# Patient Record
Sex: FEMALE | Race: WHITE | NOT HISPANIC OR LATINO | Employment: OTHER | URBAN - METROPOLITAN AREA
[De-identification: names, ages, dates, MRNs, and addresses within clinical notes are randomized per-mention and may not be internally consistent; named-entity substitution may affect disease eponyms.]

---

## 2017-03-20 RX ORDER — CLOPIDOGREL BISULFATE 75 MG/1
TABLET ORAL
Qty: 90 TABLET | Refills: 1 | Status: SHIPPED | OUTPATIENT
Start: 2017-03-20 | End: 2017-09-12 | Stop reason: SDUPTHER

## 2017-04-19 ENCOUNTER — OFFICE VISIT (OUTPATIENT)
Dept: CARDIOLOGY | Facility: CLINIC | Age: 67
End: 2017-04-19
Payer: MEDICARE

## 2017-04-19 ENCOUNTER — CLINICAL SUPPORT (OUTPATIENT)
Dept: CARDIOLOGY | Facility: CLINIC | Age: 67
End: 2017-04-19
Payer: MEDICARE

## 2017-04-19 VITALS
HEART RATE: 61 BPM | DIASTOLIC BLOOD PRESSURE: 60 MMHG | BODY MASS INDEX: 26.24 KG/M2 | SYSTOLIC BLOOD PRESSURE: 120 MMHG | HEIGHT: 65 IN | WEIGHT: 157.5 LBS

## 2017-04-19 DIAGNOSIS — I48.92 ATRIAL FLUTTER, PAROXYSMAL: ICD-10-CM

## 2017-04-19 DIAGNOSIS — Z98.61 HISTORY OF CORONARY ANGIOPLASTY: Chronic | ICD-10-CM

## 2017-04-19 DIAGNOSIS — I25.10 CORONARY ARTERY DISEASE DUE TO LIPID RICH PLAQUE: ICD-10-CM

## 2017-04-19 DIAGNOSIS — I25.83 CORONARY ARTERY DISEASE DUE TO LIPID RICH PLAQUE: ICD-10-CM

## 2017-04-19 DIAGNOSIS — I20.9 ANGINA, CLASS II: ICD-10-CM

## 2017-04-19 DIAGNOSIS — I25.10 CORONARY ARTERY DISEASE, OCCLUSIVE: Primary | ICD-10-CM

## 2017-04-19 DIAGNOSIS — I48.92 ATRIAL FLUTTER, PAROXYSMAL: Primary | ICD-10-CM

## 2017-04-19 PROCEDURE — 99999 PR PBB SHADOW E&M-EST. PATIENT-LVL III: CPT | Mod: PBBFAC,,, | Performed by: NUCLEAR MEDICINE

## 2017-04-19 PROCEDURE — 99214 OFFICE O/P EST MOD 30 MIN: CPT | Mod: S$PBB,,, | Performed by: NUCLEAR MEDICINE

## 2017-04-19 PROCEDURE — 93000 ELECTROCARDIOGRAM COMPLETE: CPT | Mod: S$GLB,,, | Performed by: INTERNAL MEDICINE

## 2017-04-19 PROCEDURE — 99213 OFFICE O/P EST LOW 20 MIN: CPT | Mod: PBBFAC | Performed by: NUCLEAR MEDICINE

## 2017-04-19 NOTE — PROGRESS NOTES
Subjective:   Patient ID:  Shanda Marrero is a 66 y.o. female who presents for follow-up of Carotid Artery Disease and Atrial Fibrillation (PAF.)      HPICHRONIC CAD-/ POST PCI- ANGINA..  2- PAF,  3- DYSLIPIDEMIA  NO RECURRENT ANGINA. NO UNUSUAL SIMPSON. NO ORTHOPNEA OR PND  NO PALPITATIONS. NO SYNCOPE  NO EDEMA. NO INTERMITTENT CLAUDICATION   NO FOCAL CNS SYMPTOMS OR SIGNS TO SUGGEST TIA OR STROKE  CARD MED WELL TOLERATED    Review of Systems   Constitution: Negative for chills, fever, weakness, night sweats, weight gain and weight loss.   HENT: Negative for headaches and nosebleeds.    Eyes: Negative for blurred vision, double vision and visual disturbance.   Cardiovascular: Negative for chest pain, dyspnea on exertion, irregular heartbeat, leg swelling, orthopnea, palpitations, paroxysmal nocturnal dyspnea and syncope.   Respiratory: Negative for cough, hemoptysis and wheezing.    Endocrine: Negative for polydipsia and polyuria.   Hematologic/Lymphatic: Does not bruise/bleed easily.   Skin: Negative for rash.   Musculoskeletal: Negative for joint pain, joint swelling, muscle weakness and myalgias.   Gastrointestinal: Negative for abdominal pain, hematemesis, jaundice and melena.   Genitourinary: Negative for dysuria, hematuria and nocturia.   Neurological: Negative for dizziness, focal weakness and sensory change.   Psychiatric/Behavioral: Negative for depression. The patient does not have insomnia and is not nervous/anxious.      Family History   Problem Relation Age of Onset    Heart attack Father     Heart disease Father      Past Medical History:   Diagnosis Date    *Atrial flutter     PAROXYSMAL    Acute coronary syndrome     OLD INF MI    Coronary artery disease     Hyperlipidemia      Current Outpatient Prescriptions on File Prior to Visit   Medication Sig Dispense Refill    aspirin (ECOTRIN) 81 MG EC tablet Take 81 mg by mouth once daily.      clopidogrel (PLAVIX) 75 mg tablet TAKE 1 TABLET BY MOUTH  EVERY DAY 90 tablet 1    coenzyme Q10 10 mg capsule Take 200 mg by mouth once daily.      D3-red wine-resveratrol-malt 5,000-200 unit-mg Cap Take by mouth.      metoprolol succinate (TOPROL-XL) 25 MG 24 hr tablet TAKE 1 TABLET EVERY DAY 90 tablet 3    omega-3 fatty acids-vitamin E (FISH OIL) 1,000 mg Cap Take 1,000 mg by mouth.       omeprazole (PRILOSEC) 20 MG capsule Take 20 mg by mouth once daily.      simvastatin (ZOCOR) 40 MG tablet TAKE 1 TABLET EVERY EVENING 90 tablet 3    esomeprazole (NEXIUM) 20 MG capsule Take 20 mg by mouth before breakfast.       No current facility-administered medications on file prior to visit.      Review of patient's allergies indicates:   Allergen Reactions    No known allergies        Objective:     Physical Exam   Constitutional: She is oriented to person, place, and time. She appears well-developed. No distress.   HENT:   Head: Normocephalic.   Eyes: Conjunctivae are normal. Pupils are equal, round, and reactive to light. No scleral icterus.   Neck: Normal range of motion. Neck supple. Normal carotid pulses, no hepatojugular reflux and no JVD present. Carotid bruit is not present. No edema present. No thyroid mass and no thyromegaly present.   Cardiovascular: Normal rate, regular rhythm, S1 normal, S2 normal, normal heart sounds and intact distal pulses.  PMI is not displaced.  Exam reveals no gallop and no friction rub.    No murmur heard.  Pulses:       Carotid pulses are 2+ on the right side, and 2+ on the left side.       Radial pulses are 2+ on the right side, and 2+ on the left side.        Femoral pulses are 2+ on the right side, and 2+ on the left side.       Popliteal pulses are 2+ on the right side, and 2+ on the left side.        Dorsalis pedis pulses are 2+ on the right side, and 2+ on the left side.        Posterior tibial pulses are 2+ on the right side, and 2+ on the left side.   Pulmonary/Chest: Effort normal and breath sounds normal. She has no wheezes.  She has no rales. She exhibits no tenderness.   Abdominal: Soft. Bowel sounds are normal. She exhibits no pulsatile midline mass and no mass. There is no hepatosplenomegaly. There is no tenderness.   Musculoskeletal: Normal range of motion. She exhibits no edema or tenderness.        Cervical back: Normal.        Thoracic back: Normal.        Lumbar back: Normal.   Lymphadenopathy:     She has no cervical adenopathy.     She has no axillary adenopathy.        Right: No supraclavicular adenopathy present.        Left: No supraclavicular adenopathy present.   Neurological: She is alert and oriented to person, place, and time. She has normal strength and normal reflexes. No sensory deficit. Gait normal.   Skin: Skin is warm. No rash noted. No cyanosis. No pallor. Nails show no clubbing.   Psychiatric: She has a normal mood and affect. Her speech is normal and behavior is normal. Cognition and memory are normal.       Assessment:     1. Coronary artery disease, occlusive    2. Angina, class II    3. History of coronary angioplasty    4. Atrial flutter, paroxysmal      ECG TODAY- SR, INF SCAR, NO ACUTE ST T CHANGES  Plan:     Coronary artery disease, occlusive    Angina, class II    History of coronary angioplasty    Atrial flutter, paroxysmal      1- CONTINUE PRESENT CARD MANAGEMENT    2- RETURN IN 6 MONTHS

## 2017-04-19 NOTE — MR AVS SNAPSHOT
[facility]  [id number] [facility] [address]  Phone: [phone]  Fax: [phone]                  Shanda Marrero   2017 1:40 PM   Office Visit    Description:  Female : 1950   Provider:  Rosalio León MD   Department:  [facility]           Reason for Visit     Carotid Artery Disease     Atrial Fibrillation           Diagnoses this Visit        Comments    Coronary artery disease, occlusive    -  Primary     Angina, class II         History of coronary angioplasty         Atrial flutter, paroxysmal                To Do List           Goals (5 Years of Data)     None      Follow-Up and Disposition     Return in about 6 months (around 10/19/2017).      [facility]     [facility] -  Assistance  Unless otherwise directed by your provider, please contact [facility], our nurse care line that is available for  assistance.     Registered nurses in the [facility] provide: appointment scheduling, clinical advisement, health education, and other advisory services.  Call: [phone] (toll free)               Medications           Message regarding Medications     Verify the changes and/or additions to your medication regime listed below are the same as discussed with your clinician today.  If any of these changes or additions are incorrect, please notify your healthcare provider.             Verify that the below list of medications is an accurate representation of the medications you are currently taking.  If none reported, the list may be blank. If incorrect, please contact your healthcare provider. Carry this list with you in case of emergency.           Current Medications     aspirin (ECOTRIN) 81 MG EC tablet Take 81 mg by mouth once daily.    clopidogrel (PLAVIX) 75 mg tablet TAKE 1 TABLET BY MOUTH EVERY DAY    coenzyme Q10 10 mg capsule Take 200 mg by mouth once daily.    D3-red wine-resveratrol-malt  "5,000-200 unit-mg Cap Take by mouth.    DIPHENHYDRAMINE HCL (BENADRYL ALLERGY ORAL) Take 2 tablets by mouth nightly as needed.    metoprolol succinate (TOPROL-XL) 25 MG 24 hr tablet TAKE 1 TABLET EVERY DAY    omega-3 fatty acids-vitamin E (FISH OIL) 1,000 mg Cap Take 1,000 mg by mouth.     omeprazole (PRILOSEC) 20 MG capsule Take 20 mg by mouth once daily.    simvastatin (ZOCOR) 40 MG tablet TAKE 1 TABLET EVERY EVENING    esomeprazole (NEXIUM) 20 MG capsule Take 20 mg by mouth before breakfast.           Clinical Reference Information           Your Vitals Were     BP Pulse Height Weight BMI    120/60 (BP Location: Left arm, Patient Position: Sitting, BP Method: Manual) 61 5' 4.5" (1.638 m) 71.5 kg (157 lb 8.3 oz) 26.62 kg/m2      Blood Pressure          Most Recent Value    BP  120/60      Allergies as of 4/19/2017     No Known Allergies      Immunizations Administered on Date of Encounter - 4/19/2017     None      [facility] Sign-Up     Activating your [facility] account is as easy as 1-2-3!     1) Visit [email or web address], select Sign Up Now, enter this activation code and your date of birth, then select Next.  [email or web address]  Expires: 6/3/2017  1:29 PM      2) Create a username and password to use when you visit [facility] in the future and select a security question in case you lose your password and select Next.    3) Enter your e-mail address and click Sign Up!    Additional Information  If you have questions, please e-mail [email or web address] or call [phone] to talk to our [facility] staff. Remember, [facility] is NOT to be used for urgent needs. For medical emergencies, dial 911.         Language Assistance Services     ATTENTION: Language assistance services are available, free of charge. Please call [phone].      ATENCIÓN: Si habla español, tiene a cee disposición servicios gratuitos de asistencia lingüística. Llame al [phone].     CHÚ Ý: N?u b?n nói Ti?ng Vi?t, có các d?ch v? h? tr? ngôn " ng? mi?n phí dành cho b?n. G?i s? [phone].         [facility] complies with applicable Federal civil rights laws and does not discriminate on the basis of race, color, national origin, age, disability, or sex.

## 2017-06-15 RX ORDER — METOPROLOL SUCCINATE 25 MG/1
TABLET, EXTENDED RELEASE ORAL
Qty: 90 TABLET | Refills: 3 | Status: SHIPPED | OUTPATIENT
Start: 2017-06-15 | End: 2018-05-25 | Stop reason: SDUPTHER

## 2017-09-12 DIAGNOSIS — I25.10 CORONARY ATHEROSCLEROSIS: ICD-10-CM

## 2017-09-12 RX ORDER — CLOPIDOGREL BISULFATE 75 MG/1
TABLET ORAL
Qty: 90 TABLET | Refills: 1 | Status: SHIPPED | OUTPATIENT
Start: 2017-09-12 | End: 2018-02-27 | Stop reason: SDUPTHER

## 2017-09-12 RX ORDER — SIMVASTATIN 40 MG/1
TABLET, FILM COATED ORAL
Qty: 90 TABLET | Refills: 3 | Status: SHIPPED | OUTPATIENT
Start: 2017-09-12 | End: 2018-08-21 | Stop reason: SDUPTHER

## 2017-10-18 ENCOUNTER — OFFICE VISIT (OUTPATIENT)
Dept: CARDIOLOGY | Facility: CLINIC | Age: 67
End: 2017-10-18
Payer: MEDICARE

## 2017-10-18 VITALS
DIASTOLIC BLOOD PRESSURE: 60 MMHG | WEIGHT: 167.13 LBS | HEIGHT: 64 IN | HEART RATE: 64 BPM | SYSTOLIC BLOOD PRESSURE: 120 MMHG | BODY MASS INDEX: 28.53 KG/M2

## 2017-10-18 DIAGNOSIS — Z98.61 HISTORY OF CORONARY ANGIOPLASTY: Chronic | ICD-10-CM

## 2017-10-18 DIAGNOSIS — I25.83 CORONARY ARTERY DISEASE DUE TO LIPID RICH PLAQUE: ICD-10-CM

## 2017-10-18 DIAGNOSIS — I25.10 CORONARY ARTERY DISEASE DUE TO LIPID RICH PLAQUE: ICD-10-CM

## 2017-10-18 DIAGNOSIS — I10 BENIGN ESSENTIAL HTN: ICD-10-CM

## 2017-10-18 DIAGNOSIS — I25.10 CORONARY ARTERY DISEASE, OCCLUSIVE: Primary | ICD-10-CM

## 2017-10-18 DIAGNOSIS — I10 BENIGN ESSENTIAL HTN: Primary | ICD-10-CM

## 2017-10-18 DIAGNOSIS — E78.5 DYSLIPIDEMIA: Chronic | ICD-10-CM

## 2017-10-18 PROCEDURE — 93000 ELECTROCARDIOGRAM COMPLETE: CPT | Mod: S$GLB,,, | Performed by: INTERNAL MEDICINE

## 2017-10-18 PROCEDURE — 99214 OFFICE O/P EST MOD 30 MIN: CPT | Mod: S$GLB,,, | Performed by: NUCLEAR MEDICINE

## 2017-10-18 PROCEDURE — 99999 PR PBB SHADOW E&M-EST. PATIENT-LVL III: CPT | Mod: PBBFAC,,, | Performed by: NUCLEAR MEDICINE

## 2017-10-18 NOTE — PROGRESS NOTES
Subjective:   Patient ID:  Shanda Marrero is a 67 y.o. female who presents for follow-up of Coronary Artery Disease (followup) and Hyperlipidemia      HPI CHRONIC CAD- ANGINA FC 2- POST PCI   2-DYSLIPIDEMIA  DOING WELL. NO RECURRENT ANGINA. NO RECENT HOSPITALIZATIONS OR ED VISITS FOR ACS  NO ANGINA EQUIVALENT  NO UNUSUAL SIMPSON.NO ORTHOPNEA OR PND  NO PALPITATIONS  NO SYNCOPE  NO EDEMA. NO CALVE TENDERNESS.  NO  FOCAL CNS SYMPTOMS OR SIGNS TO SUGGEST TIA OR STROKE  CARD MED GOOD COMPLIANCE    Review of Systems   Constitution: Negative for chills, fever, weakness, night sweats, weight gain and weight loss.   HENT: Negative for nosebleeds.    Eyes: Negative for blurred vision, double vision and visual disturbance.   Cardiovascular: Negative for chest pain, dyspnea on exertion, irregular heartbeat, leg swelling, orthopnea, palpitations, paroxysmal nocturnal dyspnea and syncope.   Respiratory: Negative for cough, hemoptysis and wheezing.    Endocrine: Negative for polydipsia and polyuria.   Hematologic/Lymphatic: Does not bruise/bleed easily.   Skin: Negative for rash.   Musculoskeletal: Negative for joint pain, joint swelling, muscle weakness and myalgias.   Gastrointestinal: Negative for abdominal pain, hematemesis, jaundice and melena.   Genitourinary: Negative for dysuria, hematuria and nocturia.   Neurological: Negative for dizziness, focal weakness, headaches and sensory change.   Psychiatric/Behavioral: Negative for depression. The patient does not have insomnia and is not nervous/anxious.      Family History   Problem Relation Age of Onset    Heart attack Father     Heart disease Father      Past Medical History:   Diagnosis Date    *Atrial flutter     PAROXYSMAL    Acute coronary syndrome     OLD INF MI    Coronary artery disease     Hyperlipidemia      Current Outpatient Prescriptions on File Prior to Visit   Medication Sig Dispense Refill    aspirin (ECOTRIN) 81 MG EC tablet Take 81 mg by mouth once daily.       clopidogrel (PLAVIX) 75 mg tablet TAKE 1 TABLET BY MOUTH EVERY DAY 90 tablet 1    coenzyme Q10 10 mg capsule Take 200 mg by mouth once daily.      D3-red wine-resveratrol-malt 5,000-200 unit-mg Cap Take by mouth.      DIPHENHYDRAMINE HCL (BENADRYL ALLERGY ORAL) Take 2 tablets by mouth nightly as needed.      esomeprazole (NEXIUM) 20 MG capsule Take 20 mg by mouth before breakfast.      metoprolol succinate (TOPROL-XL) 25 MG 24 hr tablet TAKE 1 TABLET BY MOUTH EVERY DAY 90 tablet 3    omega-3 fatty acids-vitamin E (FISH OIL) 1,000 mg Cap Take 1,000 mg by mouth.       simvastatin (ZOCOR) 40 MG tablet TAKE 1 TABLET EVERY EVENING 90 tablet 3    omeprazole (PRILOSEC) 20 MG capsule Take 20 mg by mouth once daily.       No current facility-administered medications on file prior to visit.      Review of patient's allergies indicates:   Allergen Reactions    No known allergies        Objective:     Physical Exam   Constitutional: She is oriented to person, place, and time. She appears well-developed. No distress.   HENT:   Head: Normocephalic.   Eyes: Conjunctivae are normal. Pupils are equal, round, and reactive to light. No scleral icterus.   Neck: Normal range of motion. Neck supple. Normal carotid pulses, no hepatojugular reflux and no JVD present. Carotid bruit is not present. No edema present. No thyroid mass and no thyromegaly present.   Cardiovascular: Normal rate, regular rhythm, S1 normal, S2 normal, normal heart sounds and intact distal pulses.  PMI is not displaced.  Exam reveals no gallop and no friction rub.    No murmur heard.  Pulses:       Carotid pulses are 2+ on the right side, and 2+ on the left side.       Radial pulses are 2+ on the right side, and 2+ on the left side.        Femoral pulses are 2+ on the right side, and 2+ on the left side.       Popliteal pulses are 2+ on the right side, and 2+ on the left side.        Dorsalis pedis pulses are 2+ on the right side, and 2+ on the left  side.        Posterior tibial pulses are 2+ on the right side, and 2+ on the left side.   Pulmonary/Chest: Effort normal and breath sounds normal. She has no wheezes. She has no rales. She exhibits no tenderness.   Abdominal: Soft. Bowel sounds are normal. She exhibits no pulsatile midline mass and no mass. There is no hepatosplenomegaly. There is no tenderness.   Musculoskeletal: Normal range of motion. She exhibits no edema or tenderness.        Cervical back: Normal.        Thoracic back: Normal.        Lumbar back: Normal.   Lymphadenopathy:     She has no cervical adenopathy.     She has no axillary adenopathy.        Right: No supraclavicular adenopathy present.        Left: No supraclavicular adenopathy present.   Neurological: She is alert and oriented to person, place, and time. She has normal strength and normal reflexes. No sensory deficit. Gait normal.   Skin: Skin is warm. No rash noted. No cyanosis. No pallor. Nails show no clubbing.   Psychiatric: She has a normal mood and affect. Her speech is normal and behavior is normal. Cognition and memory are normal.       Assessment:     1. Coronary artery disease, occlusive    2. History of coronary angioplasty    3. Dyslipidemia      STABLE CAD- ANGINA PATERN-  NO EVIDENCE OF ACTIVE MYOCARDIAL ISCHEMIA  NO ARRHYTHMIAS.  NO ACUTE HF  CNS STATUS STABLE  CARD MED WELL TOLERATED  Plan:     Coronary artery disease, occlusive    History of coronary angioplasty    Dyslipidemia      1- SCHEDULE CMP AND LIPIDS     CONTINUE PRESENT CARD MANAGEMENT    2- RETURN IN 6 MONTHS.

## 2017-10-19 ENCOUNTER — LAB VISIT (OUTPATIENT)
Dept: LAB | Facility: HOSPITAL | Age: 67
End: 2017-10-19
Attending: NUCLEAR MEDICINE
Payer: MEDICARE

## 2017-10-19 DIAGNOSIS — E78.5 DYSLIPIDEMIA: Chronic | ICD-10-CM

## 2017-10-19 DIAGNOSIS — I25.10 CORONARY ARTERY DISEASE, OCCLUSIVE: ICD-10-CM

## 2017-10-19 LAB
ALBUMIN SERPL BCP-MCNC: 3.5 G/DL
ALP SERPL-CCNC: 87 U/L
ALT SERPL W/O P-5'-P-CCNC: 11 U/L
ANION GAP SERPL CALC-SCNC: 9 MMOL/L
AST SERPL-CCNC: 18 U/L
BILIRUB SERPL-MCNC: 0.5 MG/DL
BUN SERPL-MCNC: 12 MG/DL
CALCIUM SERPL-MCNC: 9.3 MG/DL
CHLORIDE SERPL-SCNC: 107 MMOL/L
CHOLEST SERPL-MCNC: 181 MG/DL
CHOLEST/HDLC SERPL: 3 {RATIO}
CO2 SERPL-SCNC: 27 MMOL/L
CREAT SERPL-MCNC: 0.9 MG/DL
EST. GFR  (AFRICAN AMERICAN): >60 ML/MIN/1.73 M^2
EST. GFR  (NON AFRICAN AMERICAN): >60 ML/MIN/1.73 M^2
GLUCOSE SERPL-MCNC: 87 MG/DL
HDLC SERPL-MCNC: 60 MG/DL
HDLC SERPL: 33.1 %
LDLC SERPL CALC-MCNC: 94.6 MG/DL
NONHDLC SERPL-MCNC: 121 MG/DL
POTASSIUM SERPL-SCNC: 4.2 MMOL/L
PROT SERPL-MCNC: 6.6 G/DL
SODIUM SERPL-SCNC: 143 MMOL/L
TRIGL SERPL-MCNC: 132 MG/DL

## 2017-10-19 PROCEDURE — 36415 COLL VENOUS BLD VENIPUNCTURE: CPT | Mod: PO

## 2017-10-19 PROCEDURE — 80061 LIPID PANEL: CPT

## 2017-10-19 PROCEDURE — 80053 COMPREHEN METABOLIC PANEL: CPT

## 2018-01-22 DIAGNOSIS — I20.9 ANGINA, CLASS II: Primary | ICD-10-CM

## 2018-01-22 RX ORDER — OMEPRAZOLE 20 MG/1
20 CAPSULE, DELAYED RELEASE ORAL DAILY
Qty: 30 CAPSULE | Refills: 5 | Status: SHIPPED | OUTPATIENT
Start: 2018-01-22 | End: 2018-05-16

## 2018-02-27 RX ORDER — CLOPIDOGREL BISULFATE 75 MG/1
TABLET ORAL
Qty: 90 TABLET | Refills: 1 | Status: SHIPPED | OUTPATIENT
Start: 2018-02-27 | End: 2018-09-06 | Stop reason: SDUPTHER

## 2018-05-16 ENCOUNTER — OFFICE VISIT (OUTPATIENT)
Dept: CARDIOLOGY | Facility: CLINIC | Age: 68
End: 2018-05-16
Payer: MEDICARE

## 2018-05-16 VITALS
DIASTOLIC BLOOD PRESSURE: 60 MMHG | HEIGHT: 64 IN | SYSTOLIC BLOOD PRESSURE: 112 MMHG | BODY MASS INDEX: 28.49 KG/M2 | WEIGHT: 166.88 LBS | HEART RATE: 62 BPM

## 2018-05-16 DIAGNOSIS — I25.10 CORONARY ARTERY DISEASE, OCCLUSIVE: Primary | ICD-10-CM

## 2018-05-16 DIAGNOSIS — Z98.61 HISTORY OF CORONARY ANGIOPLASTY: Chronic | ICD-10-CM

## 2018-05-16 DIAGNOSIS — I48.92 ATRIAL FLUTTER, PAROXYSMAL: ICD-10-CM

## 2018-05-16 DIAGNOSIS — E78.5 DYSLIPIDEMIA: Chronic | ICD-10-CM

## 2018-05-16 DIAGNOSIS — I20.9 ANGINA, CLASS II: ICD-10-CM

## 2018-05-16 PROCEDURE — 99999 PR PBB SHADOW E&M-EST. PATIENT-LVL III: CPT | Mod: PBBFAC,,, | Performed by: NUCLEAR MEDICINE

## 2018-05-16 PROCEDURE — 99213 OFFICE O/P EST LOW 20 MIN: CPT | Mod: PBBFAC | Performed by: NUCLEAR MEDICINE

## 2018-05-16 PROCEDURE — 99214 OFFICE O/P EST MOD 30 MIN: CPT | Mod: S$PBB,,, | Performed by: NUCLEAR MEDICINE

## 2018-05-16 RX ORDER — OMEPRAZOLE 40 MG/1
40 CAPSULE, DELAYED RELEASE ORAL DAILY
Qty: 90 CAPSULE | Refills: 3 | Status: SHIPPED | OUTPATIENT
Start: 2018-05-16 | End: 2019-05-03 | Stop reason: SDUPTHER

## 2018-05-16 NOTE — PROGRESS NOTES
Subjective:   Patient ID:  Shanda Marrero is a 67 y.o. female who presents for follow-up of Coronary Artery Disease and Hyperlipidemia      HPI 1- CHRONIC CAD- OLD MI, POST PCI  2- PAFL  3- DYSLIPIDEMIA  NO RECURRENT ANGINA. NO UNUSUAL SIMPSON. NO ORTHOPNEA OR PND  NO PALPITATIONS. NO NEAR SYNCOPE OR SYNCOPE  NO EDEMA. NO CALVE TENDERNESS  NO FOCAL CNS SYMPTOMS OR SIGNS TO SUGGEST TIA OR STROKE  NO INTERMITTENT CLAUDICATION  NO ABNORMAL BLEEDING  CARD MED GOOD COMPLIANCE    Review of Systems   Constitution: Negative for chills, fever, weakness, night sweats, weight gain and weight loss.   HENT: Negative for nosebleeds.    Eyes: Negative for blurred vision, double vision and visual disturbance.   Cardiovascular: Negative for chest pain, dyspnea on exertion, irregular heartbeat, leg swelling, orthopnea, palpitations, paroxysmal nocturnal dyspnea and syncope.   Respiratory: Negative for cough, hemoptysis and wheezing.    Endocrine: Negative for polydipsia and polyuria.   Hematologic/Lymphatic: Does not bruise/bleed easily.   Skin: Negative for rash.   Musculoskeletal: Negative for joint pain, joint swelling, muscle weakness and myalgias.   Gastrointestinal: Negative for abdominal pain, hematemesis, jaundice and melena.   Genitourinary: Negative for dysuria, hematuria and nocturia.   Neurological: Negative for dizziness, focal weakness, headaches and sensory change.   Psychiatric/Behavioral: Negative for depression. The patient does not have insomnia and is not nervous/anxious.      Family History   Problem Relation Age of Onset    Heart attack Father     Heart disease Father      Past Medical History:   Diagnosis Date    *Atrial flutter     PAROXYSMAL    Acute coronary syndrome     OLD INF MI    Coronary artery disease     Hyperlipidemia      Current Outpatient Prescriptions on File Prior to Visit   Medication Sig Dispense Refill    aspirin (ECOTRIN) 81 MG EC tablet Take 81 mg by mouth once daily.      clopidogrel  (PLAVIX) 75 mg tablet TAKE 1 TABLET BY MOUTH EVERY DAY 90 tablet 1    coenzyme Q10 10 mg capsule Take 200 mg by mouth once daily.      D3-red wine-resveratrol-malt 5,000-200 unit-mg Cap Take by mouth.      DIPHENHYDRAMINE HCL (BENADRYL ALLERGY ORAL) Take 2 tablets by mouth nightly as needed.      metoprolol succinate (TOPROL-XL) 25 MG 24 hr tablet TAKE 1 TABLET BY MOUTH EVERY DAY 90 tablet 3    omega-3 fatty acids-vitamin E (FISH OIL) 1,000 mg Cap Take 1,000 mg by mouth.       simvastatin (ZOCOR) 40 MG tablet TAKE 1 TABLET EVERY EVENING 90 tablet 3    [DISCONTINUED] omeprazole (PRILOSEC) 20 MG capsule Take 1 capsule (20 mg total) by mouth once daily. 30 capsule 5     No current facility-administered medications on file prior to visit.      Review of patient's allergies indicates:   Allergen Reactions    No known allergies        Objective:     Physical Exam   Constitutional: She is oriented to person, place, and time. She appears well-developed. No distress.   HENT:   Head: Normocephalic.   Eyes: Conjunctivae are normal. Pupils are equal, round, and reactive to light. No scleral icterus.   Neck: Normal range of motion. Neck supple. Normal carotid pulses, no hepatojugular reflux and no JVD present. Carotid bruit is not present. No edema present. No thyroid mass and no thyromegaly present.   Cardiovascular: Normal rate, regular rhythm, S1 normal, S2 normal, normal heart sounds and intact distal pulses.  PMI is not displaced.  Exam reveals no gallop and no friction rub.    No murmur heard.  Pulses:       Carotid pulses are 2+ on the right side, and 2+ on the left side.       Radial pulses are 2+ on the right side, and 2+ on the left side.        Femoral pulses are 2+ on the right side, and 2+ on the left side.       Popliteal pulses are 2+ on the right side, and 2+ on the left side.        Dorsalis pedis pulses are 2+ on the right side, and 2+ on the left side.        Posterior tibial pulses are 2+ on the right  side, and 2+ on the left side.   Pulmonary/Chest: Effort normal and breath sounds normal. She has no wheezes. She has no rales. She exhibits no tenderness.   Abdominal: Soft. Bowel sounds are normal. She exhibits no pulsatile midline mass and no mass. There is no hepatosplenomegaly. There is no tenderness.   Musculoskeletal: Normal range of motion. She exhibits no edema or tenderness.        Cervical back: Normal.        Thoracic back: Normal.        Lumbar back: Normal.   Lymphadenopathy:     She has no cervical adenopathy.     She has no axillary adenopathy.        Right: No supraclavicular adenopathy present.        Left: No supraclavicular adenopathy present.   Neurological: She is alert and oriented to person, place, and time. She has normal strength and normal reflexes. No sensory deficit. Gait normal.   Skin: Skin is warm. No rash noted. No cyanosis. No pallor. Nails show no clubbing.   Psychiatric: She has a normal mood and affect. Her speech is normal and behavior is normal. Cognition and memory are normal.       Assessment:     1. Coronary artery disease, occlusive    2. Angina, class II    3. History of coronary angioplasty    4. Atrial flutter, paroxysmal    5. Dyslipidemia      STABLE CAD- NO ACTIVE MYOCARDIAL ISCHEMIA  NO ADHF. NO ARRHYTHMIAS  CNS STATUS STABLE  CARD MED WELL TOLERATED  Plan:     Coronary artery disease, occlusive    Angina, class II    History of coronary angioplasty    Atrial flutter, paroxysmal    Dyslipidemia    1- CONTINUE PRESENT CARD MANAGEMENT    2- RETURN IN 6 MONTHS.

## 2018-05-25 RX ORDER — METOPROLOL SUCCINATE 25 MG/1
TABLET, EXTENDED RELEASE ORAL
Qty: 90 TABLET | Refills: 3 | Status: SHIPPED | OUTPATIENT
Start: 2018-05-25 | End: 2019-06-11 | Stop reason: SDUPTHER

## 2018-08-21 DIAGNOSIS — I25.10 CORONARY ATHEROSCLEROSIS: ICD-10-CM

## 2018-08-21 RX ORDER — SIMVASTATIN 40 MG/1
TABLET, FILM COATED ORAL
Qty: 90 TABLET | Refills: 3 | Status: SHIPPED | OUTPATIENT
Start: 2018-08-21 | End: 2019-09-18 | Stop reason: SDUPTHER

## 2018-09-06 RX ORDER — CLOPIDOGREL BISULFATE 75 MG/1
TABLET ORAL
Qty: 90 TABLET | Refills: 1 | Status: SHIPPED | OUTPATIENT
Start: 2018-09-06 | End: 2019-03-20 | Stop reason: SDUPTHER

## 2018-11-01 DIAGNOSIS — I48.92 ATRIAL FLUTTER, PAROXYSMAL: Primary | ICD-10-CM

## 2018-11-06 ENCOUNTER — CLINICAL SUPPORT (OUTPATIENT)
Dept: CARDIOLOGY | Facility: CLINIC | Age: 68
End: 2018-11-06
Payer: MEDICARE

## 2018-11-06 ENCOUNTER — OFFICE VISIT (OUTPATIENT)
Dept: CARDIOLOGY | Facility: CLINIC | Age: 68
End: 2018-11-06
Payer: MEDICARE

## 2018-11-06 VITALS
BODY MASS INDEX: 26.05 KG/M2 | SYSTOLIC BLOOD PRESSURE: 130 MMHG | DIASTOLIC BLOOD PRESSURE: 62 MMHG | HEART RATE: 62 BPM | WEIGHT: 152.56 LBS | HEIGHT: 64 IN

## 2018-11-06 DIAGNOSIS — I25.10 CORONARY ARTERY DISEASE, OCCLUSIVE: Primary | ICD-10-CM

## 2018-11-06 DIAGNOSIS — Z98.61 HISTORY OF CORONARY ANGIOPLASTY: Chronic | ICD-10-CM

## 2018-11-06 DIAGNOSIS — I48.92 ATRIAL FLUTTER, PAROXYSMAL: ICD-10-CM

## 2018-11-06 DIAGNOSIS — I20.9 ANGINA, CLASS II: ICD-10-CM

## 2018-11-06 PROCEDURE — 99999 PR PBB SHADOW E&M-EST. PATIENT-LVL III: CPT | Mod: PBBFAC,,, | Performed by: NUCLEAR MEDICINE

## 2018-11-06 PROCEDURE — 99214 OFFICE O/P EST MOD 30 MIN: CPT | Mod: S$PBB,,, | Performed by: NUCLEAR MEDICINE

## 2018-11-06 PROCEDURE — 99213 OFFICE O/P EST LOW 20 MIN: CPT | Mod: PBBFAC,25 | Performed by: NUCLEAR MEDICINE

## 2018-11-06 PROCEDURE — 93005 ELECTROCARDIOGRAM TRACING: CPT | Mod: PBBFAC | Performed by: INTERNAL MEDICINE

## 2018-11-06 PROCEDURE — 93010 ELECTROCARDIOGRAM REPORT: CPT | Mod: S$PBB,,, | Performed by: INTERNAL MEDICINE

## 2018-11-06 NOTE — PROGRESS NOTES
Subjective:   Patient ID:  Shanda Marrero is a 68 y.o. female who presents for follow-up of Atrial Fibrillation and Coronary Artery Disease      HPI1- CHRONIC CAD- OLD INF MI- POST PCI,  2- ESSENTIAL HTN  DOING WELL. NO RECURRENT ANGINA . NO UNUSUAL SIMPSON. NO ORTHOPNEA OR PND  NO PALPITATIONS  NO NEAR SYNCOPE OR SYNCOPE  NO EDEMA. NO CALVE TENDERNESS. NO INTERMITTENT CLAUDICATION  NO FOCAL CNS SYMPTOMS OR SIGNS TO SUGGEST TIA OR STROKE  CARD MED GOOD COMPLIANCE    Review of Systems   Constitution: Negative for chills, fever, weakness, night sweats, weight gain and weight loss.   HENT: Negative for nosebleeds.    Eyes: Negative for blurred vision, double vision and visual disturbance.   Cardiovascular: Negative for chest pain, dyspnea on exertion, irregular heartbeat, leg swelling, orthopnea, palpitations, paroxysmal nocturnal dyspnea and syncope.   Respiratory: Negative for cough, hemoptysis and wheezing.    Endocrine: Negative for polydipsia and polyuria.   Hematologic/Lymphatic: Does not bruise/bleed easily.   Skin: Negative for rash.   Musculoskeletal: Negative for joint pain, joint swelling, muscle weakness and myalgias.   Gastrointestinal: Negative for abdominal pain, hematemesis, jaundice and melena.   Genitourinary: Negative for dysuria, hematuria and nocturia.   Neurological: Negative for dizziness, focal weakness, headaches and sensory change.   Psychiatric/Behavioral: Negative for depression. The patient does not have insomnia and is not nervous/anxious.      Family History   Problem Relation Age of Onset    Heart attack Father     Heart disease Father      Past Medical History:   Diagnosis Date    *Atrial flutter     PAROXYSMAL    Acute coronary syndrome     OLD INF MI    Coronary artery disease     Hyperlipidemia      Current Outpatient Medications on File Prior to Visit   Medication Sig Dispense Refill    aspirin (ECOTRIN) 81 MG EC tablet Take 81 mg by mouth once daily.      clopidogrel (PLAVIX) 75  mg tablet TAKE 1 TABLET BY MOUTH EVERY DAY 90 tablet 1    coenzyme Q10 10 mg capsule Take 200 mg by mouth once daily.      D3-red wine-resveratrol-malt 5,000-200 unit-mg Cap Take by mouth.      metoprolol succinate (TOPROL-XL) 25 MG 24 hr tablet TAKE 1 TABLET BY MOUTH EVERY DAY 90 tablet 3    omega-3 fatty acids-vitamin E (FISH OIL) 1,000 mg Cap Take 1,000 mg by mouth.       omeprazole (PRILOSEC) 40 MG capsule Take 1 capsule (40 mg total) by mouth once daily. 90 capsule 3    simvastatin (ZOCOR) 40 MG tablet TAKE 1 TABLET EVERY EVENING 90 tablet 3    DIPHENHYDRAMINE HCL (BENADRYL ALLERGY ORAL) Take 2 tablets by mouth nightly as needed.       No current facility-administered medications on file prior to visit.      Review of patient's allergies indicates:   Allergen Reactions    No known allergies        Objective:     Physical Exam   Constitutional: She is oriented to person, place, and time. She appears well-developed. No distress.   HENT:   Head: Normocephalic.   Eyes: Conjunctivae are normal. Pupils are equal, round, and reactive to light. No scleral icterus.   Neck: Normal range of motion. Neck supple. Normal carotid pulses, no hepatojugular reflux and no JVD present. Carotid bruit is not present. No edema present. No thyroid mass and no thyromegaly present.   Cardiovascular: Normal rate, regular rhythm, S1 normal, S2 normal, normal heart sounds and intact distal pulses. PMI is not displaced. Exam reveals no gallop and no friction rub.   No murmur heard.  Pulses:       Carotid pulses are 2+ on the right side, and 2+ on the left side.       Radial pulses are 2+ on the right side, and 2+ on the left side.        Femoral pulses are 2+ on the right side, and 2+ on the left side.       Popliteal pulses are 2+ on the right side, and 2+ on the left side.        Dorsalis pedis pulses are 2+ on the right side, and 2+ on the left side.        Posterior tibial pulses are 2+ on the right side, and 2+ on the left  side.   Pulmonary/Chest: Effort normal and breath sounds normal. She has no wheezes. She has no rales. She exhibits no tenderness.   Abdominal: Soft. Bowel sounds are normal. She exhibits no pulsatile midline mass and no mass. There is no hepatosplenomegaly. There is no tenderness.   Musculoskeletal: Normal range of motion. She exhibits no edema or tenderness.        Cervical back: Normal.        Thoracic back: Normal.        Lumbar back: Normal.   Lymphadenopathy:     She has no cervical adenopathy.     She has no axillary adenopathy.        Right: No supraclavicular adenopathy present.        Left: No supraclavicular adenopathy present.   Neurological: She is alert and oriented to person, place, and time. She has normal strength and normal reflexes. No sensory deficit. Gait normal.   Skin: Skin is warm. No rash noted. No cyanosis. No pallor. Nails show no clubbing.   Psychiatric: She has a normal mood and affect. Her speech is normal and behavior is normal. Cognition and memory are normal.       Assessment:     1. Coronary artery disease, occlusive    2. Angina, class II    3. History of coronary angioplasty      STABLE CAD- ANGINA PATTERN STABLE  NO EVIDENCE OF ADHF. NO  ARRHYTHMIAS  ECG - SR, CHRONIC NS S T T CHANGES  CNS STATUS STABLE  Plan:     Coronary artery disease, occlusive    Angina, class II    History of coronary angioplasty      1- CONTINUE PRESENT CARD MANAGEMENT    2- RETURN IN 6 MONTHS.

## 2019-03-20 ENCOUNTER — TELEPHONE (OUTPATIENT)
Dept: CARDIOLOGY | Facility: CLINIC | Age: 69
End: 2019-03-20

## 2019-03-20 RX ORDER — CLOPIDOGREL BISULFATE 75 MG/1
75 TABLET ORAL DAILY
Qty: 90 TABLET | Refills: 3 | Status: SHIPPED | OUTPATIENT
Start: 2019-03-20 | End: 2019-12-12 | Stop reason: SDUPTHER

## 2019-03-20 NOTE — TELEPHONE ENCOUNTER
Approved Medications      clopidogrel (PLAVIX) 75 mg tablet         Sig: Take 1 tablet (75 mg total) by mouth once daily.    Disp:  90 tablet    Refills:  3    Start: 3/20/2019    Class: Normal    Authorized by: Rosalio León MD        To be filled at: [facility]/pharmacy #[id number] - [place], [place] - [address] AT [facility] [place], [facility]        Patient notified refill authorized.

## 2019-03-20 NOTE — TELEPHONE ENCOUNTER
----- Message from Gemini Colbert sent at 3/20/2019  2:14 PM CDT -----  Contact: Nidia[facility]  Type:  Pharmacy Calling to Clarify an RX    Name of Caller:Nidia  [facility]  Prescription Name:generic plavix  What do they need to clarify?:refill  Best Call Back Number:[phone]  Additional Information: .    Thank you

## 2019-03-20 NOTE — TELEPHONE ENCOUNTER
Duplicate message.    Approved Medications      clopidogrel (PLAVIX) 75 mg tablet       Sig: Take 1 tablet (75 mg total) by mouth once daily.   Disp:  90 tablet    Refills:  3   Start: 3/20/2019   Class: Normal   Authorized by: Rosalio León MD      To be filled at: [facility]/pharmacy #[id number] - [place], [place] - [address] AT [facility]      patient notified refill authorized.

## 2019-03-20 NOTE — TELEPHONE ENCOUNTER
----- Message from Kai Oscar sent at 3/20/2019  1:33 PM CDT -----  Contact: self [phone]  .Type:  RX Refill Request    Who Called: Shanda Marrero   Refill or New Rx:refill  RX Name and Strength:Plavix   How is the patient currently taking it? (ex. 1XDay):  Is this a 30 day or 90 day RX:30 day   Preferred Pharmacy with phone number:..  [facility]/pharmacy #[id number] - [place], [place] - [address]  [address] [address]  Phone: [phone] Fax: [phone]      Local or Mail Order:local  Ordering Provider:Dr León  Would the patient rather a call back or a response via [facility]? Call back  Best Call Back Number:[phone]  Additional Information: pt states she is currently out of meds, and has requested refills since last Thursday.

## 2019-05-01 ENCOUNTER — OFFICE VISIT (OUTPATIENT)
Dept: CARDIOLOGY | Facility: CLINIC | Age: 69
End: 2019-05-01
Payer: MEDICARE

## 2019-05-01 VITALS
WEIGHT: 155.19 LBS | SYSTOLIC BLOOD PRESSURE: 124 MMHG | BODY MASS INDEX: 26.49 KG/M2 | HEART RATE: 60 BPM | HEIGHT: 64 IN | DIASTOLIC BLOOD PRESSURE: 60 MMHG

## 2019-05-01 DIAGNOSIS — I48.92 ATRIAL FLUTTER, PAROXYSMAL: ICD-10-CM

## 2019-05-01 DIAGNOSIS — E78.5 DYSLIPIDEMIA: Chronic | ICD-10-CM

## 2019-05-01 DIAGNOSIS — I25.10 CORONARY ARTERY DISEASE, OCCLUSIVE: Primary | ICD-10-CM

## 2019-05-01 DIAGNOSIS — Z98.61 HISTORY OF CORONARY ANGIOPLASTY: Chronic | ICD-10-CM

## 2019-05-01 DIAGNOSIS — I20.9 ANGINA, CLASS II: ICD-10-CM

## 2019-05-01 PROCEDURE — 99213 OFFICE O/P EST LOW 20 MIN: CPT | Mod: PBBFAC | Performed by: NUCLEAR MEDICINE

## 2019-05-01 PROCEDURE — 99999 PR PBB SHADOW E&M-EST. PATIENT-LVL III: ICD-10-PCS | Mod: PBBFAC,,, | Performed by: NUCLEAR MEDICINE

## 2019-05-01 PROCEDURE — 99999 PR PBB SHADOW E&M-EST. PATIENT-LVL III: CPT | Mod: PBBFAC,,, | Performed by: NUCLEAR MEDICINE

## 2019-05-01 PROCEDURE — 99214 OFFICE O/P EST MOD 30 MIN: CPT | Mod: S$PBB,,, | Performed by: NUCLEAR MEDICINE

## 2019-05-01 PROCEDURE — 99214 PR OFFICE/OUTPT VISIT, EST, LEVL IV, 30-39 MIN: ICD-10-PCS | Mod: S$PBB,,, | Performed by: NUCLEAR MEDICINE

## 2019-05-01 RX ORDER — LEVOCETIRIZINE DIHYDROCHLORIDE 5 MG/1
5 TABLET, FILM COATED ORAL NIGHTLY
Refills: 3 | COMMUNITY
Start: 2019-04-23 | End: 2022-07-17

## 2019-05-01 RX ORDER — TRIAMCINOLONE ACETONIDE 1 MG/G
CREAM TOPICAL
Refills: 2 | COMMUNITY
Start: 2019-04-25 | End: 2021-09-20 | Stop reason: ALTCHOICE

## 2019-05-01 NOTE — PROGRESS NOTES
Subjective:   Patient ID:  Shanda Marrero is a 68 y.o. female who presents for follow-up of Coronary Artery Disease; Atrial Flutter (PAROXYSMAL); and Hyperlipidemia      HPI  NO RECURRENT ANGINA OR EQUIVALENT  CARD MED GOOD COMPLIANCE, NO SIDE EFFECTS  NO FOCAL CNS SYMPTOMS OR SIGNS TO SUGGEST TIA OR STROKE  NO UNUSUAL SIMPSON. NO ORTHOPNEA OR PND  NO PALPITATIONS. NO NEAR SYNCOPE OR SYNCOPE  NO EDEMA. NO CALVE TENDERNESS  NO INTERMITTENT CLAUDICATION  NO ABDOMINAL DISCOMFORT    Review of Systems   Constitution: Negative for chills, fever, night sweats, weight gain and weight loss.   HENT: Negative for nosebleeds.    Eyes: Negative for blurred vision, double vision and visual disturbance.   Cardiovascular: Negative for chest pain, dyspnea on exertion, irregular heartbeat, leg swelling, orthopnea, palpitations, paroxysmal nocturnal dyspnea and syncope.   Respiratory: Negative for cough, hemoptysis and wheezing.    Endocrine: Negative for polydipsia and polyuria.   Hematologic/Lymphatic: Does not bruise/bleed easily.   Skin: Negative for rash.   Musculoskeletal: Negative for joint pain, joint swelling, muscle weakness and myalgias.   Gastrointestinal: Negative for abdominal pain, hematemesis, jaundice and melena.   Genitourinary: Negative for dysuria, hematuria and nocturia.   Neurological: Negative for dizziness, focal weakness, headaches, sensory change and weakness.   Psychiatric/Behavioral: Negative for depression. The patient does not have insomnia and is not nervous/anxious.      Family History   Problem Relation Age of Onset    Heart attack Father     Heart disease Father      Past Medical History:   Diagnosis Date    *Atrial flutter     PAROXYSMAL    Acute coronary syndrome     OLD INF MI    Coronary artery disease     Hyperlipidemia      Social History     Socioeconomic History    Marital status:      Spouse name: Not on file    Number of children: Not on file    Years of education: Not on file     Highest education level: Not on file   Occupational History    Not on file   Social Needs    Financial resource strain: Not on file    Food insecurity:     Worry: Not on file     Inability: Not on file    Transportation needs:     Medical: Not on file     Non-medical: Not on file   Tobacco Use    Smoking status: Former Smoker     Last attempt to quit: 2009     Years since quittin.6    Smokeless tobacco: Never Used   Substance and Sexual Activity    Alcohol use: Yes     Comment: occassionally    Drug use: No    Sexual activity: Not on file   Lifestyle    Physical activity:     Days per week: Not on file     Minutes per session: Not on file    Stress: Not on file   Relationships    Social connections:     Talks on phone: Not on file     Gets together: Not on file     Attends Church service: Not on file     Active member of club or organization: Not on file     Attends meetings of clubs or organizations: Not on file     Relationship status: Not on file   Other Topics Concern    Not on file   Social History Narrative    Not on file     Current Outpatient Medications on File Prior to Visit   Medication Sig Dispense Refill    aspirin (ECOTRIN) 81 MG EC tablet Take 81 mg by mouth once daily.      clopidogrel (PLAVIX) 75 mg tablet Take 1 tablet (75 mg total) by mouth once daily. 90 tablet 3    coenzyme Q10 10 mg capsule Take 200 mg by mouth once daily.      D3-red wine-resveratrol-malt 5,000-200 unit-mg Cap Take by mouth.      DIPHENHYDRAMINE HCL (BENADRYL ALLERGY ORAL) Take 2 tablets by mouth nightly as needed.      levocetirizine (XYZAL) 5 MG tablet Take 5 mg by mouth nightly.  3    metoprolol succinate (TOPROL-XL) 25 MG 24 hr tablet TAKE 1 TABLET BY MOUTH EVERY DAY 90 tablet 3    omega-3 fatty acids-vitamin E (FISH OIL) 1,000 mg Cap Take 1,000 mg by mouth.       omeprazole (PRILOSEC) 40 MG capsule Take 1 capsule (40 mg total) by mouth once daily. 90 capsule 3    simvastatin (ZOCOR) 40  MG tablet TAKE 1 TABLET EVERY EVENING 90 tablet 3    triamcinolone acetonide 0.1% (KENALOG) 0.1 % cream APPLY TO RASH 2 TIMES A DAY AS NEEDED  2     No current facility-administered medications on file prior to visit.      Review of patient's allergies indicates:   Allergen Reactions    No known allergies        Objective:     Physical Exam   Constitutional: She is oriented to person, place, and time. She appears well-developed. No distress.   HENT:   Head: Normocephalic.   Eyes: Pupils are equal, round, and reactive to light. Conjunctivae are normal. No scleral icterus.   Neck: Normal range of motion. Neck supple. Normal carotid pulses, no hepatojugular reflux and no JVD present. Carotid bruit is not present. No edema present. No thyroid mass and no thyromegaly present.   Cardiovascular: Normal rate, regular rhythm, S1 normal, S2 normal, normal heart sounds and intact distal pulses. PMI is not displaced. Exam reveals no gallop and no friction rub.   No murmur heard.  Pulses:       Carotid pulses are 2+ on the right side, and 2+ on the left side.       Radial pulses are 2+ on the right side, and 2+ on the left side.        Femoral pulses are 2+ on the right side, and 2+ on the left side.       Popliteal pulses are 2+ on the right side, and 2+ on the left side.        Dorsalis pedis pulses are 2+ on the right side, and 2+ on the left side.        Posterior tibial pulses are 2+ on the right side, and 2+ on the left side.   Pulmonary/Chest: Effort normal and breath sounds normal. She has no wheezes. She has no rales. She exhibits no tenderness.   Abdominal: Soft. Bowel sounds are normal. She exhibits no pulsatile midline mass and no mass. There is no hepatosplenomegaly. There is no tenderness.   Musculoskeletal: Normal range of motion. She exhibits no edema or tenderness.        Cervical back: Normal.        Thoracic back: Normal.        Lumbar back: Normal.   Lymphadenopathy:     She has no cervical adenopathy.      She has no axillary adenopathy.        Right: No supraclavicular adenopathy present.        Left: No supraclavicular adenopathy present.   Neurological: She is alert and oriented to person, place, and time. She has normal strength and normal reflexes. No sensory deficit. Gait normal.   Skin: Skin is warm. No rash noted. No cyanosis. No pallor. Nails show no clubbing.   Psychiatric: She has a normal mood and affect. Her speech is normal and behavior is normal. Cognition and memory are normal.       Assessment:     1. Coronary artery disease, occlusive    2. Angina, class II    3. History of coronary angioplasty    4. Atrial flutter, paroxysmal    5. Dyslipidemia        Plan:     Coronary artery disease, occlusivE  NO CLINICAL EVIDENCE OF ACTIVE MYOCARDIAL ISCHEMIA  NO ARRHYTHMIAS  NO ADHF  Angina, class II  STABLE ANGINA PATTERN    History of coronary angioplasty  ON ASA- ON ABNORMAL BLEEDING    Atrial flutter, paroxysmal  REMAINS ON SINUS RHYTHM  CNS STATUS STABLE    Dyslipidemia  STATIN WELL TOLERATED      CONTINUE PRESENT CARD MANAGEMENT    RETURN IN 6 MONTHS.

## 2019-05-03 RX ORDER — OMEPRAZOLE 40 MG/1
CAPSULE, DELAYED RELEASE ORAL
Qty: 90 CAPSULE | Refills: 3 | Status: SHIPPED | OUTPATIENT
Start: 2019-05-03 | End: 2019-12-12 | Stop reason: SDUPTHER

## 2019-06-11 ENCOUNTER — TELEPHONE (OUTPATIENT)
Dept: CARDIOLOGY | Facility: CLINIC | Age: 69
End: 2019-06-11

## 2019-06-11 RX ORDER — METOPROLOL SUCCINATE 25 MG/1
25 TABLET, EXTENDED RELEASE ORAL DAILY
Qty: 90 TABLET | Refills: 3 | Status: SHIPPED | OUTPATIENT
Start: 2019-06-11 | End: 2019-12-12 | Stop reason: SDUPTHER

## 2019-06-11 NOTE — TELEPHONE ENCOUNTER
----- Message from Janene Boone sent at 6/11/2019 11:58 AM CDT -----  Contact: pt  Type:  RX Refill Request    Who Called: [facility]  Refill or New Rx:REFILL  RX Name and Strength:metoprolol succinate (TOPROL-XL) 25 MG 24 hr tablet  How is the patient currently taking it? (ex. 1XDay):1XDAY  Is this a 30 day or 90 day RX:90 DAY  Preferred Pharmacy with phone number:  [facility]/pharmacy #[id number] - [place], [place] - [address]  [address] [address]  Phone: [phone] Fax: [phone]  Local or Mail Order:LOCAL  Ordering Provider:   Would the patient rather a call back or a response via [facility]? call  Best Call Back Number [phone]  Additional Information: n/a

## 2019-09-18 DIAGNOSIS — I25.10 CORONARY ATHEROSCLEROSIS: ICD-10-CM

## 2019-09-18 RX ORDER — SIMVASTATIN 40 MG/1
40 TABLET, FILM COATED ORAL NIGHTLY
Qty: 90 TABLET | Refills: 3 | Status: SHIPPED | OUTPATIENT
Start: 2019-09-18 | End: 2019-12-12 | Stop reason: SDUPTHER

## 2019-10-29 ENCOUNTER — CLINICAL SUPPORT (OUTPATIENT)
Dept: CARDIOLOGY | Facility: CLINIC | Age: 69
End: 2019-10-29
Payer: MEDICARE

## 2019-10-29 ENCOUNTER — OFFICE VISIT (OUTPATIENT)
Dept: CARDIOLOGY | Facility: CLINIC | Age: 69
End: 2019-10-29
Payer: MEDICARE

## 2019-10-29 VITALS
BODY MASS INDEX: 26.79 KG/M2 | WEIGHT: 156.94 LBS | HEIGHT: 64 IN | DIASTOLIC BLOOD PRESSURE: 70 MMHG | HEART RATE: 60 BPM | SYSTOLIC BLOOD PRESSURE: 112 MMHG

## 2019-10-29 DIAGNOSIS — I25.2 HISTORY OF HEART ATTACK: Chronic | ICD-10-CM

## 2019-10-29 DIAGNOSIS — I20.9 ANGINA, CLASS II: ICD-10-CM

## 2019-10-29 DIAGNOSIS — I48.92 ATRIAL FLUTTER, PAROXYSMAL: Primary | ICD-10-CM

## 2019-10-29 DIAGNOSIS — E78.5 DYSLIPIDEMIA: Chronic | ICD-10-CM

## 2019-10-29 DIAGNOSIS — I48.92 ATRIAL FLUTTER, PAROXYSMAL: ICD-10-CM

## 2019-10-29 DIAGNOSIS — Z98.61 HISTORY OF CORONARY ANGIOPLASTY: Chronic | ICD-10-CM

## 2019-10-29 DIAGNOSIS — I25.10 CORONARY ARTERY DISEASE, OCCLUSIVE: Primary | ICD-10-CM

## 2019-10-29 PROCEDURE — 99213 OFFICE O/P EST LOW 20 MIN: CPT | Mod: PBBFAC,25 | Performed by: NUCLEAR MEDICINE

## 2019-10-29 PROCEDURE — 99214 OFFICE O/P EST MOD 30 MIN: CPT | Mod: S$PBB,,, | Performed by: NUCLEAR MEDICINE

## 2019-10-29 PROCEDURE — 93010 ELECTROCARDIOGRAM REPORT: CPT | Mod: S$PBB,,, | Performed by: INTERNAL MEDICINE

## 2019-10-29 PROCEDURE — 99999 PR PBB SHADOW E&M-EST. PATIENT-LVL III: CPT | Mod: PBBFAC,,, | Performed by: NUCLEAR MEDICINE

## 2019-10-29 PROCEDURE — 93010 EKG 12-LEAD: ICD-10-PCS | Mod: S$PBB,,, | Performed by: INTERNAL MEDICINE

## 2019-10-29 PROCEDURE — 93005 ELECTROCARDIOGRAM TRACING: CPT | Mod: PBBFAC | Performed by: INTERNAL MEDICINE

## 2019-10-29 PROCEDURE — 99999 PR PBB SHADOW E&M-EST. PATIENT-LVL III: ICD-10-PCS | Mod: PBBFAC,,, | Performed by: NUCLEAR MEDICINE

## 2019-10-29 PROCEDURE — 99214 PR OFFICE/OUTPT VISIT, EST, LEVL IV, 30-39 MIN: ICD-10-PCS | Mod: S$PBB,,, | Performed by: NUCLEAR MEDICINE

## 2019-10-29 NOTE — PROGRESS NOTES
Subjective:   Patient ID:  Shanda Marrero is a 69 y.o. female who presents for follow-up of Follow-up (6 mo); Coronary Artery Disease (OLD MI/ PCI); and Hyperlipidemia      HPI NO RECURRENT ANGINA . NO RECENT ED VISITS OR HOSPITALIZATIONS FOR ACS,  ARRHYTHMIAS OR ADHF  NO UNUSUAL SIMPSON WITH ORDINARY DAILY ACTIVITIES  NO ORTHOPNEA OR PND  NO PALPITATIONS-  NO FALLS. NO NEAR SYNCOPE OR SYNCOPE  NO FOCAL CNS SYMPTOMS OR SIGNS TO SUGGEST TIA OR STROKE  NO EDEMA. NO CALVE TENDERNESS  NO ABDOMINAL DISCOMFORT  ECG TODAY- SR, 64 BMP, IMPROVEMENT ST T CHANGES IN THE LATERAL LEADS  CARD MED GOOD COMPLIANCE    Review of Systems   Constitution: Negative for chills, fever, night sweats, weight gain and weight loss.   HENT: Negative for nosebleeds.    Eyes: Negative for blurred vision, double vision and visual disturbance.   Cardiovascular: Negative for chest pain, dyspnea on exertion, irregular heartbeat, leg swelling, orthopnea, palpitations, paroxysmal nocturnal dyspnea and syncope.   Respiratory: Negative for cough, hemoptysis and wheezing.    Endocrine: Negative for polydipsia and polyuria.   Hematologic/Lymphatic: Does not bruise/bleed easily.   Skin: Negative for rash.   Musculoskeletal: Negative for joint pain, joint swelling, muscle weakness and myalgias.   Gastrointestinal: Negative for abdominal pain, hematemesis, jaundice and melena.   Genitourinary: Negative for dysuria, hematuria and nocturia.   Neurological: Negative for dizziness, focal weakness, headaches, sensory change and weakness.   Psychiatric/Behavioral: Negative for depression. The patient does not have insomnia and is not nervous/anxious.      Family History   Problem Relation Age of Onset    Heart attack Father     Heart disease Father      Past Medical History:   Diagnosis Date    *Atrial flutter     PAROXYSMAL    Acute coronary syndrome     OLD INF MI    Coronary artery disease     Hyperlipidemia      Social History     Socioeconomic History     Marital status:      Spouse name: Not on file    Number of children: Not on file    Years of education: Not on file    Highest education level: Not on file   Occupational History    Not on file   Social Needs    Financial resource strain: Not on file    Food insecurity:     Worry: Not on file     Inability: Not on file    Transportation needs:     Medical: Not on file     Non-medical: Not on file   Tobacco Use    Smoking status: Former Smoker     Last attempt to quit: 9/21/2009     Years since quitting: 10.1    Smokeless tobacco: Never Used   Substance and Sexual Activity    Alcohol use: Yes     Comment: occassionally    Drug use: No    Sexual activity: Not on file   Lifestyle    Physical activity:     Days per week: Not on file     Minutes per session: Not on file    Stress: Not on file   Relationships    Social connections:     Talks on phone: Not on file     Gets together: Not on file     Attends Sabianism service: Not on file     Active member of club or organization: Not on file     Attends meetings of clubs or organizations: Not on file     Relationship status: Not on file   Other Topics Concern    Not on file   Social History Narrative    Not on file     Current Outpatient Medications on File Prior to Visit   Medication Sig Dispense Refill    aspirin (ECOTRIN) 81 MG EC tablet Take 81 mg by mouth once daily.      clopidogrel (PLAVIX) 75 mg tablet Take 1 tablet (75 mg total) by mouth once daily. 90 tablet 3    coenzyme Q10 10 mg capsule Take 200 mg by mouth once daily.      D3-red wine-resveratrol-malt 5,000-200 unit-mg Cap Take by mouth.      DIPHENHYDRAMINE HCL (BENADRYL ALLERGY ORAL) Take 2 tablets by mouth nightly as needed.      levocetirizine (XYZAL) 5 MG tablet Take 5 mg by mouth nightly.  3    metoprolol succinate (TOPROL-XL) 25 MG 24 hr tablet Take 1 tablet (25 mg total) by mouth once daily. 90 tablet 3    omega-3 fatty acids-vitamin E (FISH OIL) 1,000 mg Cap Take 1,000  mg by mouth.       omeprazole (PRILOSEC) 40 MG capsule TAKE 1 CAPSULE BY MOUTH EVERY DAY 90 capsule 3    simvastatin (ZOCOR) 40 MG tablet Take 1 tablet (40 mg total) by mouth every evening. 90 tablet 3    triamcinolone acetonide 0.1% (KENALOG) 0.1 % cream APPLY TO RASH 2 TIMES A DAY AS NEEDED  2     No current facility-administered medications on file prior to visit.      Review of patient's allergies indicates:   Allergen Reactions    No known allergies        Objective:     Physical Exam   Constitutional: She is oriented to person, place, and time. She appears well-developed. No distress.   HENT:   Head: Normocephalic.   Eyes: Pupils are equal, round, and reactive to light. Conjunctivae are normal. No scleral icterus.   Neck: Normal range of motion. Neck supple. Normal carotid pulses, no hepatojugular reflux and no JVD present. Carotid bruit is not present. No edema present. No thyroid mass and no thyromegaly present.   Cardiovascular: Normal rate, regular rhythm, S1 normal, S2 normal, normal heart sounds and intact distal pulses. PMI is not displaced. Exam reveals no gallop and no friction rub.   No murmur heard.  Pulses:       Carotid pulses are 2+ on the right side, and 2+ on the left side.       Radial pulses are 2+ on the right side, and 2+ on the left side.        Femoral pulses are 2+ on the right side, and 2+ on the left side.       Popliteal pulses are 2+ on the right side, and 2+ on the left side.        Dorsalis pedis pulses are 2+ on the right side, and 2+ on the left side.        Posterior tibial pulses are 2+ on the right side, and 2+ on the left side.   Pulmonary/Chest: Effort normal and breath sounds normal. She has no wheezes. She has no rales. She exhibits no tenderness.   Abdominal: Soft. Bowel sounds are normal. She exhibits no pulsatile midline mass and no mass. There is no hepatosplenomegaly. There is no tenderness.   Musculoskeletal: Normal range of motion. She exhibits no edema or  tenderness.        Cervical back: Normal.        Thoracic back: Normal.        Lumbar back: Normal.   Lymphadenopathy:     She has no cervical adenopathy.     She has no axillary adenopathy.        Right: No supraclavicular adenopathy present.        Left: No supraclavicular adenopathy present.   Neurological: She is alert and oriented to person, place, and time. She has normal strength and normal reflexes. No sensory deficit. Gait normal.   Skin: Skin is warm. No rash noted. No cyanosis. No pallor. Nails show no clubbing.   Psychiatric: She has a normal mood and affect. Her speech is normal and behavior is normal. Cognition and memory are normal.       Assessment:     1. Coronary artery disease, occlusive    2. History of heart attack    3. History of coronary angioplasty    4. Dyslipidemia        Plan:     Coronary artery disease, occlusive  NO CLINICAL EVIDENCE OF ACTIVE MYOCARDIAL ISCHEMIA  NO ARRHYTHMIAS    History of heart attack  STABLE CV STATUS    History of coronary angioplasty  ON DAPT    Dyslipidemia  STATIN WELL TOLERATED    CONTINUE PRESENT CARD MANAGEMENT  RETURN IN 6 MONTHS.

## 2019-12-12 DIAGNOSIS — I25.10 CORONARY ATHEROSCLEROSIS: ICD-10-CM

## 2019-12-12 RX ORDER — SIMVASTATIN 40 MG/1
40 TABLET, FILM COATED ORAL NIGHTLY
Qty: 90 TABLET | Refills: 3 | Status: SHIPPED | OUTPATIENT
Start: 2019-12-12 | End: 2019-12-16 | Stop reason: SDUPTHER

## 2019-12-12 RX ORDER — CLOPIDOGREL BISULFATE 75 MG/1
75 TABLET ORAL DAILY
Qty: 90 TABLET | Refills: 3 | Status: SHIPPED | OUTPATIENT
Start: 2019-12-12 | End: 2020-10-15

## 2019-12-12 RX ORDER — METOPROLOL SUCCINATE 25 MG/1
25 TABLET, EXTENDED RELEASE ORAL DAILY
Qty: 90 TABLET | Refills: 3 | Status: SHIPPED | OUTPATIENT
Start: 2019-12-12 | End: 2020-10-15

## 2019-12-12 RX ORDER — OMEPRAZOLE 40 MG/1
40 CAPSULE, DELAYED RELEASE ORAL DAILY
Qty: 90 CAPSULE | Refills: 3 | Status: SHIPPED | OUTPATIENT
Start: 2019-12-12 | End: 2020-10-15

## 2019-12-16 DIAGNOSIS — I25.10 CORONARY ATHEROSCLEROSIS: ICD-10-CM

## 2019-12-16 RX ORDER — SIMVASTATIN 40 MG/1
40 TABLET, FILM COATED ORAL NIGHTLY
Qty: 90 TABLET | Refills: 3 | Status: SHIPPED | OUTPATIENT
Start: 2019-12-16 | End: 2020-03-12 | Stop reason: SDUPTHER

## 2019-12-16 NOTE — TELEPHONE ENCOUNTER
----- Message from Imelda Baer sent at 12/16/2019  8:38 AM CST -----  Contact: pt  .Type:  RX Refill Request    Who Called:  pt  Refill or New Rx: refill   RX Name and Strength: simvastatin   How is the patient currently taking it? (ex. 1XDay):   Is this a 30 day or 90 day RX:   Preferred Pharmacy with phone number:        [facility]/pharmacy #[id number] - [place], [place] - [address]  [address] [address]  Phone: [phone] Fax: [phone]     Local or Mail Order:  Local   Ordering Provider: Mau  Would the patient rather a call back or a response via [facility]? Call back   Best Call Back Number: [phone] (home)     Additional Information: pt request a call back needing refill this week can call for details , pt stated her new insurance does not start until 1/1/2020

## 2020-03-12 DIAGNOSIS — I25.10 CORONARY ATHEROSCLEROSIS: ICD-10-CM

## 2020-03-12 RX ORDER — SIMVASTATIN 40 MG/1
40 TABLET, FILM COATED ORAL NIGHTLY
Qty: 90 TABLET | Refills: 3 | Status: SHIPPED | OUTPATIENT
Start: 2020-03-12 | End: 2020-03-16 | Stop reason: SDUPTHER

## 2020-03-16 DIAGNOSIS — I25.10 CORONARY ATHEROSCLEROSIS: ICD-10-CM

## 2020-03-16 RX ORDER — SIMVASTATIN 40 MG/1
40 TABLET, FILM COATED ORAL NIGHTLY
Qty: 90 TABLET | Refills: 3 | Status: SHIPPED | OUTPATIENT
Start: 2020-03-16 | End: 2020-03-17 | Stop reason: SDUPTHER

## 2020-03-16 NOTE — TELEPHONE ENCOUNTER
----- Message from Ivania Patrick sent at 3/16/2020  2:40 PM CDT -----  Contact: pt  1. What is the name of the medication you are requesting? Simvastatin  2. What is the dose? 40mg  3. How do you take the medication? Orally, topically, etc? n/a  4. How often do you take this medication? n/a  5. Do you need a 30 day or 90 day supply? n/a  6. How many refills are you requesting? n/a  7. What is your preferred pharmacy and location of the pharmacy? [facility]  8. Who can we contact with further questions? The pt at [phone] or [phone]

## 2020-03-17 DIAGNOSIS — I25.10 CORONARY ATHEROSCLEROSIS: ICD-10-CM

## 2020-03-17 RX ORDER — SIMVASTATIN 40 MG/1
40 TABLET, FILM COATED ORAL NIGHTLY
Qty: 90 TABLET | Refills: 3 | Status: SHIPPED | OUTPATIENT
Start: 2020-03-17 | End: 2021-02-11

## 2020-05-29 ENCOUNTER — TELEPHONE (OUTPATIENT)
Dept: CARDIOLOGY | Facility: CLINIC | Age: 70
End: 2020-05-29

## 2020-05-29 NOTE — TELEPHONE ENCOUNTER
I called the patient to reschedule his visit with Dr. León and I left a voicemail for him to call back.

## 2020-06-24 DIAGNOSIS — I48.92 ATRIAL FLUTTER, PAROXYSMAL: Primary | ICD-10-CM

## 2020-08-20 ENCOUNTER — TELEPHONE (OUTPATIENT)
Dept: CARDIOLOGY | Facility: CLINIC | Age: 70
End: 2020-08-20

## 2020-08-20 NOTE — TELEPHONE ENCOUNTER
I attempted to contact the patient and left a voicemail for her to call back and schedule her 1 year follow up.

## 2020-09-03 ENCOUNTER — OFFICE VISIT (OUTPATIENT)
Dept: CARDIOLOGY | Facility: CLINIC | Age: 70
End: 2020-09-03
Payer: MEDICARE

## 2020-09-03 ENCOUNTER — HOSPITAL ENCOUNTER (OUTPATIENT)
Dept: CARDIOLOGY | Facility: HOSPITAL | Age: 70
Discharge: HOME | End: 2020-09-03
Attending: NUCLEAR MEDICINE
Payer: COMMERCIAL

## 2020-09-03 VITALS — BODY MASS INDEX: 26.31 KG/M2 | OXYGEN SATURATION: 96 % | HEART RATE: 58 BPM | HEIGHT: 64 IN | WEIGHT: 154.13 LBS

## 2020-09-03 DIAGNOSIS — I25.10 CORONARY ARTERY DISEASE, OCCLUSIVE: ICD-10-CM

## 2020-09-03 DIAGNOSIS — I48.92 ATRIAL FLUTTER, PAROXYSMAL: ICD-10-CM

## 2020-09-03 DIAGNOSIS — I20.9 ANGINA, CLASS II: Primary | ICD-10-CM

## 2020-09-03 DIAGNOSIS — Z98.61 HISTORY OF CORONARY ANGIOPLASTY: Chronic | ICD-10-CM

## 2020-09-03 PROCEDURE — 99999 PR PBB SHADOW E&M-EST. PATIENT-LVL III: ICD-10-PCS | Mod: PBBFAC,,, | Performed by: NUCLEAR MEDICINE

## 2020-09-03 PROCEDURE — 93005 ELECTROCARDIOGRAM TRACING: CPT

## 2020-09-03 PROCEDURE — 99214 PR OFFICE/OUTPT VISIT, EST, LEVL IV, 30-39 MIN: ICD-10-PCS | Mod: S$GLB,,, | Performed by: NUCLEAR MEDICINE

## 2020-09-03 PROCEDURE — 93010 EKG 12-LEAD: ICD-10-PCS | Mod: ,,, | Performed by: INTERNAL MEDICINE

## 2020-09-03 PROCEDURE — 99214 OFFICE O/P EST MOD 30 MIN: CPT | Mod: S$GLB,,, | Performed by: NUCLEAR MEDICINE

## 2020-09-03 PROCEDURE — 93010 ELECTROCARDIOGRAM REPORT: CPT | Mod: ,,, | Performed by: INTERNAL MEDICINE

## 2020-09-03 PROCEDURE — 99213 OFFICE O/P EST LOW 20 MIN: CPT | Mod: PBBFAC,25 | Performed by: NUCLEAR MEDICINE

## 2020-09-03 PROCEDURE — 99999 PR PBB SHADOW E&M-EST. PATIENT-LVL III: CPT | Mod: PBBFAC,,, | Performed by: NUCLEAR MEDICINE

## 2020-09-03 NOTE — PROGRESS NOTES
Subjective:   Patient ID:  Shanda Marrero is a 69 y.o. female who presents for follow-up of Coronary Artery Disease (angina fc 2- pci) and Atrial Flutter (paroxysmal)      HPI DOING WELL. NO RECURRENT ANGINA OR EQUIVALENT  NO RECENT HOSPITALIZATIONS OR ED VISITS FOR ACS,  ARRHYTHMIAS OR ADHF  NO RECURRENT PALPITATIONS  ECG TODAY- SB, 58, CHRONIC NS ST T CHANGES  NO UNUSUAL SIMPSON. NO ORTHOPNEA OR  PND  NO ABDOMINAL DISCOMFORT  NO EDEMA. NO CALVE TENDERNESS  NO FOCAL CNS SYMPTOMS OR SIGNS TO SUGGEST TIA OR STROKE  CARD MED - GOOD COMPLIANCE, NO SIDE EFFECTS    Review of Systems   Constitution: Negative for chills, fever, night sweats, weight gain and weight loss.   HENT: Negative for nosebleeds.    Eyes: Negative for blurred vision, double vision and visual disturbance.   Cardiovascular: Negative for chest pain, dyspnea on exertion, irregular heartbeat, leg swelling, orthopnea, palpitations, paroxysmal nocturnal dyspnea and syncope.   Respiratory: Negative for cough, hemoptysis and wheezing.    Endocrine: Negative for polydipsia and polyuria.   Hematologic/Lymphatic: Does not bruise/bleed easily.   Skin: Negative for rash.   Musculoskeletal: Negative for joint pain, joint swelling, muscle weakness and myalgias.   Gastrointestinal: Negative for abdominal pain, hematemesis, jaundice and melena.   Genitourinary: Negative for dysuria, hematuria and nocturia.   Neurological: Negative for dizziness, focal weakness, headaches, sensory change and weakness.   Psychiatric/Behavioral: Negative for depression. The patient does not have insomnia and is not nervous/anxious.      Family History   Problem Relation Age of Onset    Heart attack Father     Heart disease Father      Past Medical History:   Diagnosis Date    *Atrial flutter     PAROXYSMAL    Acute coronary syndrome     OLD INF MI    Coronary artery disease     Hyperlipidemia      Social History     Socioeconomic History    Marital status:      Spouse name: Not  on file    Number of children: Not on file    Years of education: Not on file    Highest education level: Not on file   Occupational History    Not on file   Social Needs    Financial resource strain: Not on file    Food insecurity     Worry: Not on file     Inability: Not on file    Transportation needs     Medical: Not on file     Non-medical: Not on file   Tobacco Use    Smoking status: Former Smoker     Quit date: 9/21/2009     Years since quitting: 10.9    Smokeless tobacco: Never Used   Substance and Sexual Activity    Alcohol use: Yes     Comment: occassionally    Drug use: No    Sexual activity: Not on file   Lifestyle    Physical activity     Days per week: Not on file     Minutes per session: Not on file    Stress: Not on file   Relationships    Social connections     Talks on phone: Not on file     Gets together: Not on file     Attends Shinto service: Not on file     Active member of club or organization: Not on file     Attends meetings of clubs or organizations: Not on file     Relationship status: Not on file   Other Topics Concern    Not on file   Social History Narrative    Not on file     Current Outpatient Medications on File Prior to Visit   Medication Sig Dispense Refill    aspirin (ECOTRIN) 81 MG EC tablet Take 81 mg by mouth once daily.      clopidogrel (PLAVIX) 75 mg tablet Take 1 tablet (75 mg total) by mouth once daily. 90 tablet 3    metoprolol succinate (TOPROL-XL) 25 MG 24 hr tablet Take 1 tablet (25 mg total) by mouth once daily. 90 tablet 3    omeprazole (PRILOSEC) 40 MG capsule Take 1 capsule (40 mg total) by mouth once daily. 90 capsule 3    simvastatin (ZOCOR) 40 MG tablet Take 1 tablet (40 mg total) by mouth every evening. 90 tablet 3    D3-red wine-resveratrol-malt 5,000-200 unit-mg Cap Take by mouth.      DIPHENHYDRAMINE HCL (BENADRYL ALLERGY ORAL) Take 2 tablets by mouth nightly as needed.      levocetirizine (XYZAL) 5 MG tablet Take 5 mg by mouth  nightly.  3    triamcinolone acetonide 0.1% (KENALOG) 0.1 % cream APPLY TO RASH 2 TIMES A DAY AS NEEDED  2    [DISCONTINUED] coenzyme Q10 10 mg capsule Take 200 mg by mouth once daily.      [DISCONTINUED] omega-3 fatty acids-vitamin E (FISH OIL) 1,000 mg Cap Take 1,000 mg by mouth.        No current facility-administered medications on file prior to visit.      Review of patient's allergies indicates:   Allergen Reactions    No known allergies        Objective:     Physical Exam   Constitutional: She is oriented to person, place, and time. She appears well-developed. No distress.   HENT:   Head: Normocephalic.   Eyes: Pupils are equal, round, and reactive to light. Conjunctivae are normal. No scleral icterus.   Neck: Normal range of motion. Neck supple. Normal carotid pulses, no hepatojugular reflux and no JVD present. Carotid bruit is not present. No edema present. No thyroid mass and no thyromegaly present.   Cardiovascular: Normal rate, regular rhythm, S1 normal, S2 normal, normal heart sounds and intact distal pulses. PMI is not displaced. Exam reveals no gallop and no friction rub.   No murmur heard.  Pulses:       Carotid pulses are 2+ on the right side and 2+ on the left side.       Radial pulses are 2+ on the right side and 2+ on the left side.        Femoral pulses are 2+ on the right side and 2+ on the left side.       Popliteal pulses are 2+ on the right side and 2+ on the left side.        Dorsalis pedis pulses are 2+ on the right side and 2+ on the left side.        Posterior tibial pulses are 2+ on the right side and 2+ on the left side.   Pulmonary/Chest: Effort normal and breath sounds normal. She has no wheezes. She has no rales. She exhibits no tenderness.   Abdominal: Soft. Bowel sounds are normal. She exhibits no pulsatile midline mass and no mass. There is no hepatosplenomegaly. There is no abdominal tenderness.   Musculoskeletal: Normal range of motion.         General: No tenderness or  edema.      Cervical back: Normal.      Thoracic back: Normal.      Lumbar back: Normal.   Lymphadenopathy:     She has no cervical adenopathy.     She has no axillary adenopathy.        Right: No supraclavicular adenopathy present.        Left: No supraclavicular adenopathy present.   Neurological: She is alert and oriented to person, place, and time. She has normal strength and normal reflexes. No sensory deficit. Gait normal.   Skin: Skin is warm. No rash noted. No cyanosis. No pallor. Nails show no clubbing.   Psychiatric: She has a normal mood and affect. Her speech is normal and behavior is normal. Cognition and memory are normal.       Assessment:     1. Angina, class II    2. Coronary artery disease, occlusive    3. History of coronary angioplasty    4. Atrial flutter, paroxysmal        Plan:     Angina, class II  STABLE PATTERN    Coronary artery disease, occlusive  NO CLINICA EVIDENCE OF ACTIVE MYOCARDIAL ISCHEMIA  NO ADHF    History of coronary angioplasty  STABLE  NO ABNORMAL BLEEDING- ON DAPT    Atrial flutter, paroxysmal  REMAINS ON SINUS RHYTHM.    CONTINUE PRESENT CARD MANAGEMENT  RETURN IN 6 MONTHS.

## 2020-12-07 ENCOUNTER — OFFICE VISIT (OUTPATIENT)
Dept: OPHTHALMOLOGY | Facility: CLINIC | Age: 70
End: 2020-12-07
Payer: MEDICARE

## 2020-12-07 DIAGNOSIS — H52.203 MYOPIA WITH ASTIGMATISM AND PRESBYOPIA, BILATERAL: ICD-10-CM

## 2020-12-07 DIAGNOSIS — H43.812 POSTERIOR VITREOUS DETACHMENT OF LEFT EYE: ICD-10-CM

## 2020-12-07 DIAGNOSIS — H25.013 CORTICAL AGE-RELATED CATARACT OF BOTH EYES: ICD-10-CM

## 2020-12-07 DIAGNOSIS — H52.4 MYOPIA WITH ASTIGMATISM AND PRESBYOPIA, BILATERAL: ICD-10-CM

## 2020-12-07 DIAGNOSIS — H25.13 NUCLEAR SCLEROSIS, BILATERAL: Primary | ICD-10-CM

## 2020-12-07 DIAGNOSIS — H52.13 MYOPIA WITH ASTIGMATISM AND PRESBYOPIA, BILATERAL: ICD-10-CM

## 2020-12-07 PROCEDURE — 92015 DETERMINE REFRACTIVE STATE: CPT | Mod: S$GLB,,, | Performed by: OPTOMETRIST

## 2020-12-07 PROCEDURE — 92015 PR REFRACTION: ICD-10-PCS | Mod: S$GLB,,, | Performed by: OPTOMETRIST

## 2020-12-07 PROCEDURE — 99999 PR PBB SHADOW E&M-EST. PATIENT-LVL II: ICD-10-PCS | Mod: PBBFAC,,, | Performed by: OPTOMETRIST

## 2020-12-07 PROCEDURE — 99999 PR PBB SHADOW E&M-EST. PATIENT-LVL II: CPT | Mod: PBBFAC,,, | Performed by: OPTOMETRIST

## 2020-12-07 PROCEDURE — 92004 COMPRE OPH EXAM NEW PT 1/>: CPT | Mod: S$GLB,,, | Performed by: OPTOMETRIST

## 2020-12-07 PROCEDURE — 92004 PR EYE EXAM, NEW PATIENT,COMPREHESV: ICD-10-PCS | Mod: S$GLB,,, | Performed by: OPTOMETRIST

## 2020-12-07 PROCEDURE — 92310 PR CONTACT LENS FITTING (NO CHANGE): ICD-10-PCS | Mod: CSM,S$GLB,, | Performed by: OPTOMETRIST

## 2020-12-07 PROCEDURE — 92310 CONTACT LENS FITTING OU: CPT | Mod: CSM,S$GLB,, | Performed by: OPTOMETRIST

## 2020-12-07 NOTE — PROGRESS NOTES
HPI     Blurred Vision     Comments: Yearly              Comments     New Patient  Last Eye Exam little over 2 years ago   HPI    Any vision changes since last exam: Yes, decrease with overall vision and   seeing flashes of light with OS.  Eye pain: No  Other ocular symptoms: No    Do you wear currently wear glasses or contacts? Both    Interested in contacts today? Yes    Do you plan on getting new glasses today? Yes              Last edited by Concepcion Huynh on 12/7/2020  2:54 PM. (History)            Assessment /Plan     For exam results, see Encounter Report.    Nuclear sclerosis, bilateral  Cortical age-related cataract of both eyes  Cataract accounts for change in vision. Patient is at the option stage.   Cataract surgery will improve vision, but necessity is dependent on patient's symptoms.   Patient prefers to wait on surgery at this time. Pt to call back if symptoms worsen before next appointment.    Posterior vitreous detachment of left eye  The nature of posterior vitreous detachment was discussed with the patient in detail and in lay terms.  Signs and symptoms of retinal detachment were discussed with patient.   Return to clinic as soon as possible (same day) if you notice any new floaters, flashes of light, curtain/veil over your vision from any direction, or any change in vision.  Written educational material about PVD/flashes/floaters as well as instructions for getting in touch with on call eye doctor after hours if needed were provided for the patient.    Myopia with astigmatism and presbyopia, bilateral  Eyeglass Final Rx     Eyeglass Final Rx       Sphere Cylinder Axis Add    Right -6.75 +2.00 110 +2.50    Left -4.75 +0.75 085 +2.50    Expiration Date: 12/8/2021                Contact Lens Prescription (12/7/2020)        Brand Base Curve Diameter Sphere Cylinder Axis    Right Air Optix Plus Hydraglyde for Astigmatism  8.7 14.5 -2.00 -1.75 020    Left Air Optix Plus Hydraglyde for Astigmatism  8.7  14.5 -3.00 -0.75 180    Expiration Date: 12/8/2021    Replacement: Monthly    Wearing Schedule: Daily wear        She will try new contacts  If va not adequate, RTC for cat eval with [facility]    Ok to order supply if no problems      RTC 1 yr for dilated eye exam or PRN if any problems.   Discussed above and answered questions.

## 2020-12-09 ENCOUNTER — TELEPHONE (OUTPATIENT)
Dept: FAMILY MEDICINE | Facility: CLINIC | Age: 70
End: 2020-12-09

## 2020-12-09 ENCOUNTER — HOSPITAL ENCOUNTER (OUTPATIENT)
Dept: RADIOLOGY | Facility: HOSPITAL | Age: 70
Discharge: HOME | End: 2020-12-09
Attending: FAMILY MEDICINE
Payer: MEDICARE

## 2020-12-09 ENCOUNTER — PATIENT OUTREACH (OUTPATIENT)
Dept: ADMINISTRATIVE | Facility: HOSPITAL | Age: 70
End: 2020-12-09

## 2020-12-09 ENCOUNTER — OFFICE VISIT (OUTPATIENT)
Dept: FAMILY MEDICINE | Facility: CLINIC | Age: 70
End: 2020-12-09
Payer: COMMERCIAL

## 2020-12-09 VITALS
DIASTOLIC BLOOD PRESSURE: 78 MMHG | RESPIRATION RATE: 16 BRPM | OXYGEN SATURATION: 98 % | TEMPERATURE: 97 F | WEIGHT: 163.56 LBS | HEIGHT: 64 IN | BODY MASS INDEX: 27.92 KG/M2 | HEART RATE: 85 BPM | SYSTOLIC BLOOD PRESSURE: 120 MMHG

## 2020-12-09 DIAGNOSIS — R07.81 RIB PAIN ON LEFT SIDE: ICD-10-CM

## 2020-12-09 DIAGNOSIS — Z12.39 ENCOUNTER FOR SCREENING FOR MALIGNANT NEOPLASM OF BREAST, UNSPECIFIED SCREENING MODALITY: ICD-10-CM

## 2020-12-09 DIAGNOSIS — Z78.0 POSTMENOPAUSAL: ICD-10-CM

## 2020-12-09 DIAGNOSIS — Z23 NEED FOR VACCINATION: ICD-10-CM

## 2020-12-09 DIAGNOSIS — Z12.11 SCREENING FOR COLON CANCER: ICD-10-CM

## 2020-12-09 DIAGNOSIS — I25.2 HISTORY OF HEART ATTACK: ICD-10-CM

## 2020-12-09 DIAGNOSIS — I20.9 ANGINA, CLASS II: ICD-10-CM

## 2020-12-09 DIAGNOSIS — Z11.59 NEED FOR HEPATITIS C SCREENING TEST: ICD-10-CM

## 2020-12-09 DIAGNOSIS — I25.10 CORONARY ARTERY DISEASE, OCCLUSIVE: Primary | ICD-10-CM

## 2020-12-09 PROCEDURE — 71100 X-RAY EXAM RIBS UNI 2 VIEWS: CPT | Mod: TC,PO,LT

## 2020-12-09 PROCEDURE — 71100 X-RAY EXAM RIBS UNI 2 VIEWS: CPT | Mod: 26,LT,, | Performed by: RADIOLOGY

## 2020-12-09 PROCEDURE — 3008F BODY MASS INDEX DOCD: CPT | Mod: CPTII,S$GLB,, | Performed by: FAMILY MEDICINE

## 2020-12-09 PROCEDURE — 3074F PR MOST RECENT SYSTOLIC BLOOD PRESSURE < 130 MM HG: ICD-10-PCS | Mod: CPTII,S$GLB,, | Performed by: FAMILY MEDICINE

## 2020-12-09 PROCEDURE — G0009 ADMIN PNEUMOCOCCAL VACCINE: HCPCS | Mod: S$GLB,,, | Performed by: FAMILY MEDICINE

## 2020-12-09 PROCEDURE — 1126F PR PAIN SEVERITY QUANTIFIED, NO PAIN PRESENT: ICD-10-PCS | Mod: S$GLB,,, | Performed by: FAMILY MEDICINE

## 2020-12-09 PROCEDURE — 1159F PR MEDICATION LIST DOCUMENTED IN MEDICAL RECORD: ICD-10-PCS | Mod: S$GLB,,, | Performed by: FAMILY MEDICINE

## 2020-12-09 PROCEDURE — 99204 PR OFFICE/OUTPT VISIT, NEW, LEVL IV, 45-59 MIN: ICD-10-PCS | Mod: 25,S$GLB,, | Performed by: FAMILY MEDICINE

## 2020-12-09 PROCEDURE — 1159F MED LIST DOCD IN RCRD: CPT | Mod: S$GLB,,, | Performed by: FAMILY MEDICINE

## 2020-12-09 PROCEDURE — 90694 FLU VACCINE - QUADRIVALENT - ADJUVANTED: ICD-10-PCS | Mod: S$GLB,,, | Performed by: FAMILY MEDICINE

## 2020-12-09 PROCEDURE — 3288F PR FALLS RISK ASSESSMENT DOCUMENTED: ICD-10-PCS | Mod: CPTII,S$GLB,, | Performed by: FAMILY MEDICINE

## 2020-12-09 PROCEDURE — 1126F AMNT PAIN NOTED NONE PRSNT: CPT | Mod: S$GLB,,, | Performed by: FAMILY MEDICINE

## 2020-12-09 PROCEDURE — 99499 UNLISTED E&M SERVICE: CPT | Mod: S$GLB,,, | Performed by: FAMILY MEDICINE

## 2020-12-09 PROCEDURE — G0008 ADMIN INFLUENZA VIRUS VAC: HCPCS | Mod: S$GLB,,, | Performed by: FAMILY MEDICINE

## 2020-12-09 PROCEDURE — 3078F PR MOST RECENT DIASTOLIC BLOOD PRESSURE < 80 MM HG: ICD-10-PCS | Mod: CPTII,S$GLB,, | Performed by: FAMILY MEDICINE

## 2020-12-09 PROCEDURE — 90694 VACC AIIV4 NO PRSRV 0.5ML IM: CPT | Mod: S$GLB,,, | Performed by: FAMILY MEDICINE

## 2020-12-09 PROCEDURE — 99499 RISK ADDL DX/OHS AUDIT: ICD-10-PCS | Mod: S$GLB,,, | Performed by: FAMILY MEDICINE

## 2020-12-09 PROCEDURE — 3074F SYST BP LT 130 MM HG: CPT | Mod: CPTII,S$GLB,, | Performed by: FAMILY MEDICINE

## 2020-12-09 PROCEDURE — 99204 OFFICE O/P NEW MOD 45 MIN: CPT | Mod: 25,S$GLB,, | Performed by: FAMILY MEDICINE

## 2020-12-09 PROCEDURE — 1101F PR PT FALLS ASSESS DOC 0-1 FALLS W/OUT INJ PAST YR: ICD-10-PCS | Mod: CPTII,S$GLB,, | Performed by: FAMILY MEDICINE

## 2020-12-09 PROCEDURE — 3288F FALL RISK ASSESSMENT DOCD: CPT | Mod: CPTII,S$GLB,, | Performed by: FAMILY MEDICINE

## 2020-12-09 PROCEDURE — 3008F PR BODY MASS INDEX (BMI) DOCUMENTED: ICD-10-PCS | Mod: CPTII,S$GLB,, | Performed by: FAMILY MEDICINE

## 2020-12-09 PROCEDURE — G0008 FLU VACCINE - QUADRIVALENT - ADJUVANTED: ICD-10-PCS | Mod: S$GLB,,, | Performed by: FAMILY MEDICINE

## 2020-12-09 PROCEDURE — 3078F DIAST BP <80 MM HG: CPT | Mod: CPTII,S$GLB,, | Performed by: FAMILY MEDICINE

## 2020-12-09 PROCEDURE — 90670 PCV13 VACCINE IM: CPT | Mod: S$GLB,,, | Performed by: FAMILY MEDICINE

## 2020-12-09 PROCEDURE — G0009 PNEUMOCOCCAL CONJUGATE VACCINE 13-VALENT LESS THAN 5YO & GREATER THAN: ICD-10-PCS | Mod: S$GLB,,, | Performed by: FAMILY MEDICINE

## 2020-12-09 PROCEDURE — 1101F PT FALLS ASSESS-DOCD LE1/YR: CPT | Mod: CPTII,S$GLB,, | Performed by: FAMILY MEDICINE

## 2020-12-09 PROCEDURE — 71100 XR RIBS 2 VIEW LEFT: ICD-10-PCS | Mod: 26,LT,, | Performed by: RADIOLOGY

## 2020-12-09 PROCEDURE — 99999 PR PBB SHADOW E&M-EST. PATIENT-LVL IV: CPT | Mod: PBBFAC,,, | Performed by: FAMILY MEDICINE

## 2020-12-09 PROCEDURE — 99999 PR PBB SHADOW E&M-EST. PATIENT-LVL IV: ICD-10-PCS | Mod: PBBFAC,,, | Performed by: FAMILY MEDICINE

## 2020-12-09 PROCEDURE — 90670 PNEUMOCOCCAL CONJUGATE VACCINE 13-VALENT LESS THAN 5YO & GREATER THAN: ICD-10-PCS | Mod: S$GLB,,, | Performed by: FAMILY MEDICINE

## 2020-12-09 RX ORDER — HYDROXYZINE PAMOATE 25 MG/1
25 CAPSULE ORAL EVERY 8 HOURS PRN
Qty: 20 CAPSULE | Refills: 0 | Status: SHIPPED | OUTPATIENT
Start: 2020-12-09 | End: 2021-03-15

## 2020-12-09 NOTE — PROGRESS NOTES
Subjective:       Patient ID: Shanda Marrero is a 70 y.o. female.    Chief Complaint: Establish Care      HPI Comments:       Current Outpatient Medications:     aspirin (ECOTRIN) 81 MG EC tablet, Take 81 mg by mouth once daily., Disp: , Rfl:     clopidogreL (PLAVIX) 75 mg tablet, TAKE 1 TABLET (75 MG TOTAL) BY MOUTH ONCE DAILY., Disp: 90 tablet, Rfl: 3    D3-red wine-resveratrol-malt 5,000-200 unit-mg Cap, Take by mouth., Disp: , Rfl:     levocetirizine (XYZAL) 5 MG tablet, Take 5 mg by mouth nightly., Disp: , Rfl: 3    metoprolol succinate (TOPROL-XL) 25 MG 24 hr tablet, TAKE 1 TABLET (25 MG TOTAL) BY MOUTH ONCE DAILY., Disp: 90 tablet, Rfl: 3    omeprazole (PRILOSEC) 40 MG capsule, TAKE 1 CAPSULE (40 MG TOTAL) BY MOUTH ONCE DAILY., Disp: 90 capsule, Rfl: 3    simvastatin (ZOCOR) 40 MG tablet, Take 1 tablet (40 mg total) by mouth every evening., Disp: 90 tablet, Rfl: 3    triamcinolone acetonide 0.1% (KENALOG) 0.1 % cream, APPLY TO RASH 2 TIMES A DAY AS NEEDED, Disp: , Rfl: 2    hydrOXYzine pamoate (VISTARIL) 25 MG Cap, Take 1 capsule (25 mg total) by mouth every 8 (eight) hours as needed., Disp: 20 capsule, Rfl: 0      This my 1st time seeing this patient.  She needs a new primary care physician.  She had a history of MI about 12 years ago.  Followed by cardiology.  Has done very well since that time.    Complains of some left rib pain over the last week or so.  Taking ibuprofen and Tylenol and get somewhat better.  No shortness of breath or cough.  Recalls that she had slipped while getting out of the bathtub 2 months ago and and regionally injury to this area.  It seemed to have gotten better but now has started hurting again.  She did not seek any care get x-rays at the time.    Also complains of situational numbness in her hands and fingers.  There is no pain.  No history of carpal tunnel.  Occurs sometimes in the morning when she is in bed, also occurs when she is using her fingers to type into her  "phone.  Otherwise does not have any symptoms related to this.    Takes omeprazole as needed for upper GI symptoms.  Compliant with all of her other cardiovascular medications.    Chronic insomnia.  Treated with Benadryl for years.  Then became immune.  Other primary care providers have tried various things, including melatonin without much success  .  Had a mammogram in 3/20 that was normal.  This was her 1st 1 awhile.  Open to getting a DEXA scan and, after some discussion, initial colonoscopy    Review of Systems   Constitutional: Negative for activity change, appetite change and fever.   HENT: Negative for sore throat.    Respiratory: Negative for cough and shortness of breath.    Cardiovascular: Positive for chest pain.   Gastrointestinal: Negative for abdominal pain, diarrhea and nausea.   Genitourinary: Negative for difficulty urinating.   Musculoskeletal: Negative for arthralgias and myalgias.   Neurological: Positive for numbness. Negative for dizziness and headaches.       Objective:      Vitals:    12/09/20 1305   BP: 120/78   Pulse: 85   Resp: 16   Temp: 97.3 °F (36.3 °C)   TempSrc: Temporal   SpO2: 98%   Weight: 74.2 kg (163 lb 9.3 oz)   Height: 5' 4" (1.626 m)   PainSc: 0-No pain     Physical Exam  Vitals signs and nursing note reviewed.   Constitutional:       General: She is not in acute distress.     Appearance: She is well-developed. She is not diaphoretic.   HENT:      Head: Normocephalic.      Mouth/Throat:      Pharynx: No oropharyngeal exudate.   Neck:      Musculoskeletal: Neck supple.      Thyroid: No thyromegaly.   Cardiovascular:      Rate and Rhythm: Normal rate and regular rhythm.      Pulses:           Radial pulses are 2+ on the right side and 2+ on the left side.      Heart sounds: Normal heart sounds. No murmur.   Pulmonary:      Effort: Pulmonary effort is normal. No respiratory distress.      Breath sounds: Normal breath sounds. No decreased breath sounds, wheezing, rhonchi or " rales.   Chest:      Chest wall: Tenderness present.       Abdominal:      General: There is no distension.      Palpations: Abdomen is soft.   Lymphadenopathy:      Cervical: No cervical adenopathy.   Skin:     General: Skin is warm and dry.   Neurological:      Mental Status: She is alert and oriented to person, place, and time.   Psychiatric:         Behavior: Behavior normal.         Thought Content: Thought content normal.         Judgment: Judgment normal.         Assessment:       1. Coronary artery disease, occlusive    2. Angina, class II    3. History of heart attack    4. Postmenopausal    5. Screening for colon cancer    6. Need for hepatitis C screening test    7. Need for vaccination    8. Rib pain on left side    9. Encounter for screening for malignant neoplasm of breast, unspecified screening modality        Plan:   Coronary artery disease, occlusive  Comments:  The on aspirin, Plavix, statin.   Lipid profile  Orders:  -     Comprehensive Metabolic Panel; Future; Expected date: 12/09/2020  -     CBC Auto Differential; Future; Expected date: 12/09/2020  -     Lipid Panel; Future; Expected date: 12/09/2020    Angina, class II  Comments:  None currently    History of heart attack  Comments:  on beta blocker    Postmenopausal  Comments:  DEXA scan ordered, initial  Orders:  -     DXA Bone Density Spine And Hip; Future; Expected date: 12/09/2020    Screening for colon cancer  Comments:  Colonoscopy ordered, initial  Orders:  -     Case Request Endoscopy: COLONOSCOPY    Need for hepatitis C screening test  Comments:  Hep C antibody ordered  Orders:  -     Hepatitis C Antibody; Future; Expected date: 12/09/2020    Need for vaccination  Comments:  Prevnar and influenza today  Orders:  -     Pneumococcal Conjugate Vaccine (13 Valent) (IM)    Rib pain on left side  Comments:  Rib x-rays.  Will call with results and treatment plan.  For now:  Tylenol  Orders:  -     X-Ray Ribs 2 View Left; Future; Expected  date: 12/09/2020    Encounter for screening for malignant neoplasm of breast, unspecified screening modality  Comments:  Mammogram done in March of this year.  First 1 in a long time.  Will schedule another in 2021    Other orders  -     hydrOXYzine pamoate (VISTARIL) 25 MG Cap; Take 1 capsule (25 mg total) by mouth every 8 (eight) hours as needed.  Dispense: 20 capsule; Refill: 0  -     Influenza - Quadrivalent (Adjuvanted)

## 2020-12-11 ENCOUNTER — LAB VISIT (OUTPATIENT)
Dept: LAB | Facility: HOSPITAL | Age: 70
End: 2020-12-11
Attending: FAMILY MEDICINE
Payer: MEDICARE

## 2020-12-11 DIAGNOSIS — Z11.59 NEED FOR HEPATITIS C SCREENING TEST: ICD-10-CM

## 2020-12-11 DIAGNOSIS — I25.10 CORONARY ARTERY DISEASE, OCCLUSIVE: ICD-10-CM

## 2020-12-11 LAB
ALBUMIN SERPL BCP-MCNC: 3.8 G/DL (ref 3.5–5.2)
ALP SERPL-CCNC: 93 U/L (ref 55–135)
ALT SERPL W/O P-5'-P-CCNC: 10 U/L (ref 10–44)
ANION GAP SERPL CALC-SCNC: 10 MMOL/L (ref 8–16)
AST SERPL-CCNC: 21 U/L (ref 10–40)
BASOPHILS # BLD AUTO: 0.03 K/UL (ref 0–0.2)
BASOPHILS NFR BLD: 0.7 % (ref 0–1.9)
BILIRUB SERPL-MCNC: 0.5 MG/DL (ref 0.1–1)
BUN SERPL-MCNC: 9 MG/DL (ref 8–23)
CALCIUM SERPL-MCNC: 9.2 MG/DL (ref 8.7–10.5)
CHLORIDE SERPL-SCNC: 107 MMOL/L (ref 95–110)
CHOLEST SERPL-MCNC: 194 MG/DL (ref 120–199)
CHOLEST/HDLC SERPL: 3.7 {RATIO} (ref 2–5)
CO2 SERPL-SCNC: 25 MMOL/L (ref 23–29)
CREAT SERPL-MCNC: 0.7 MG/DL (ref 0.5–1.4)
DIFFERENTIAL METHOD: ABNORMAL
EOSINOPHIL # BLD AUTO: 0.2 K/UL (ref 0–0.5)
EOSINOPHIL NFR BLD: 3.3 % (ref 0–8)
ERYTHROCYTE [DISTWIDTH] IN BLOOD BY AUTOMATED COUNT: 13.5 % (ref 11.5–14.5)
EST. GFR  (AFRICAN AMERICAN): >60 ML/MIN/1.73 M^2
EST. GFR  (NON AFRICAN AMERICAN): >60 ML/MIN/1.73 M^2
GLUCOSE SERPL-MCNC: 97 MG/DL (ref 70–110)
HCT VFR BLD AUTO: 43.7 % (ref 37–48.5)
HDLC SERPL-MCNC: 53 MG/DL (ref 40–75)
HDLC SERPL: 27.3 % (ref 20–50)
HGB BLD-MCNC: 13.8 G/DL (ref 12–16)
IMM GRANULOCYTES # BLD AUTO: 0.01 K/UL (ref 0–0.04)
IMM GRANULOCYTES NFR BLD AUTO: 0.2 % (ref 0–0.5)
LDLC SERPL CALC-MCNC: 118.4 MG/DL (ref 63–159)
LYMPHOCYTES # BLD AUTO: 0.8 K/UL (ref 1–4.8)
LYMPHOCYTES NFR BLD: 17.3 % (ref 18–48)
MCH RBC QN AUTO: 28.3 PG (ref 27–31)
MCHC RBC AUTO-ENTMCNC: 31.6 G/DL (ref 32–36)
MCV RBC AUTO: 90 FL (ref 82–98)
MONOCYTES # BLD AUTO: 0.5 K/UL (ref 0.3–1)
MONOCYTES NFR BLD: 9.9 % (ref 4–15)
NEUTROPHILS # BLD AUTO: 3.1 K/UL (ref 1.8–7.7)
NEUTROPHILS NFR BLD: 68.6 % (ref 38–73)
NONHDLC SERPL-MCNC: 141 MG/DL
NRBC BLD-RTO: 0 /100 WBC
PLATELET # BLD AUTO: 190 K/UL (ref 150–350)
PMV BLD AUTO: 11.2 FL (ref 9.2–12.9)
POTASSIUM SERPL-SCNC: 4.3 MMOL/L (ref 3.5–5.1)
PROT SERPL-MCNC: 6.8 G/DL (ref 6–8.4)
RBC # BLD AUTO: 4.88 M/UL (ref 4–5.4)
SODIUM SERPL-SCNC: 142 MMOL/L (ref 136–145)
TRIGL SERPL-MCNC: 113 MG/DL (ref 30–150)
TSH SERPL DL<=0.005 MIU/L-ACNC: 1.61 UIU/ML (ref 0.4–4)
WBC # BLD AUTO: 4.56 K/UL (ref 3.9–12.7)

## 2020-12-11 PROCEDURE — 80061 LIPID PANEL: CPT

## 2020-12-11 PROCEDURE — 36415 COLL VENOUS BLD VENIPUNCTURE: CPT | Mod: PO

## 2020-12-11 PROCEDURE — 85025 COMPLETE CBC W/AUTO DIFF WBC: CPT

## 2020-12-11 PROCEDURE — 84443 ASSAY THYROID STIM HORMONE: CPT

## 2020-12-11 PROCEDURE — 86803 HEPATITIS C AB TEST: CPT

## 2020-12-11 PROCEDURE — 80053 COMPREHEN METABOLIC PANEL: CPT

## 2020-12-14 LAB — HCV AB SERPL QL IA: NEGATIVE

## 2021-01-04 ENCOUNTER — APPOINTMENT (OUTPATIENT)
Dept: RADIOLOGY | Facility: HOSPITAL | Age: 71
End: 2021-01-04
Attending: FAMILY MEDICINE
Payer: MEDICARE

## 2021-01-04 DIAGNOSIS — Z78.0 POSTMENOPAUSAL: ICD-10-CM

## 2021-01-04 PROCEDURE — 77080 DXA BONE DENSITY AXIAL: CPT | Mod: TC

## 2021-01-04 PROCEDURE — 77080 DXA BONE DENSITY AXIAL: CPT | Mod: 26,,, | Performed by: RADIOLOGY

## 2021-01-04 PROCEDURE — 77080 DEXA BONE DENSITY SPINE HIP: ICD-10-PCS | Mod: 26,,, | Performed by: RADIOLOGY

## 2021-03-15 ENCOUNTER — OFFICE VISIT (OUTPATIENT)
Dept: TRANSPLANT | Facility: CLINIC | Age: 71
End: 2021-03-15
Payer: MEDICARE

## 2021-03-15 VITALS
WEIGHT: 158.5 LBS | HEART RATE: 63 BPM | BODY MASS INDEX: 27.06 KG/M2 | DIASTOLIC BLOOD PRESSURE: 70 MMHG | OXYGEN SATURATION: 98 % | SYSTOLIC BLOOD PRESSURE: 105 MMHG | HEIGHT: 64 IN

## 2021-03-15 DIAGNOSIS — Z98.61 HISTORY OF CORONARY ANGIOPLASTY: Chronic | ICD-10-CM

## 2021-03-15 DIAGNOSIS — E78.5 DYSLIPIDEMIA: Chronic | ICD-10-CM

## 2021-03-15 DIAGNOSIS — I20.9 ANGINA, CLASS II: ICD-10-CM

## 2021-03-15 DIAGNOSIS — I25.10 CORONARY ARTERY DISEASE, OCCLUSIVE: Primary | ICD-10-CM

## 2021-03-15 PROCEDURE — 3078F PR MOST RECENT DIASTOLIC BLOOD PRESSURE < 80 MM HG: ICD-10-PCS | Mod: CPTII,S$GLB,, | Performed by: NUCLEAR MEDICINE

## 2021-03-15 PROCEDURE — 99214 OFFICE O/P EST MOD 30 MIN: CPT | Mod: S$GLB,,, | Performed by: NUCLEAR MEDICINE

## 2021-03-15 PROCEDURE — 99999 PR PBB SHADOW E&M-EST. PATIENT-LVL IV: ICD-10-PCS | Mod: PBBFAC,,, | Performed by: NUCLEAR MEDICINE

## 2021-03-15 PROCEDURE — 3078F DIAST BP <80 MM HG: CPT | Mod: CPTII,S$GLB,, | Performed by: NUCLEAR MEDICINE

## 2021-03-15 PROCEDURE — 3008F PR BODY MASS INDEX (BMI) DOCUMENTED: ICD-10-PCS | Mod: CPTII,S$GLB,, | Performed by: NUCLEAR MEDICINE

## 2021-03-15 PROCEDURE — 99214 PR OFFICE/OUTPT VISIT, EST, LEVL IV, 30-39 MIN: ICD-10-PCS | Mod: S$GLB,,, | Performed by: NUCLEAR MEDICINE

## 2021-03-15 PROCEDURE — 1159F MED LIST DOCD IN RCRD: CPT | Mod: S$GLB,,, | Performed by: NUCLEAR MEDICINE

## 2021-03-15 PROCEDURE — 99499 RISK ADDL DX/OHS AUDIT: ICD-10-PCS | Mod: S$GLB,,, | Performed by: NUCLEAR MEDICINE

## 2021-03-15 PROCEDURE — 99499 UNLISTED E&M SERVICE: CPT | Mod: S$GLB,,, | Performed by: NUCLEAR MEDICINE

## 2021-03-15 PROCEDURE — 3074F SYST BP LT 130 MM HG: CPT | Mod: CPTII,S$GLB,, | Performed by: NUCLEAR MEDICINE

## 2021-03-15 PROCEDURE — 3008F BODY MASS INDEX DOCD: CPT | Mod: CPTII,S$GLB,, | Performed by: NUCLEAR MEDICINE

## 2021-03-15 PROCEDURE — 1159F PR MEDICATION LIST DOCUMENTED IN MEDICAL RECORD: ICD-10-PCS | Mod: S$GLB,,, | Performed by: NUCLEAR MEDICINE

## 2021-03-15 PROCEDURE — 99999 PR PBB SHADOW E&M-EST. PATIENT-LVL IV: CPT | Mod: PBBFAC,,, | Performed by: NUCLEAR MEDICINE

## 2021-03-15 PROCEDURE — 3074F PR MOST RECENT SYSTOLIC BLOOD PRESSURE < 130 MM HG: ICD-10-PCS | Mod: CPTII,S$GLB,, | Performed by: NUCLEAR MEDICINE

## 2021-04-20 ENCOUNTER — TELEPHONE (OUTPATIENT)
Dept: ENDOSCOPY | Facility: HOSPITAL | Age: 71
End: 2021-04-20

## 2021-05-13 ENCOUNTER — TELEPHONE (OUTPATIENT)
Dept: OPHTHALMOLOGY | Facility: CLINIC | Age: 71
End: 2021-05-13

## 2021-07-20 ENCOUNTER — OFFICE VISIT (OUTPATIENT)
Dept: OPHTHALMOLOGY | Facility: CLINIC | Age: 71
End: 2021-07-20
Payer: MEDICARE

## 2021-07-20 DIAGNOSIS — H10.011 ACUTE FOLLICULAR CONJUNCTIVITIS OF RIGHT EYE: Primary | ICD-10-CM

## 2021-07-20 PROCEDURE — 99999 PR PBB SHADOW E&M-EST. PATIENT-LVL II: ICD-10-PCS | Mod: PBBFAC,,, | Performed by: OPTOMETRIST

## 2021-07-20 PROCEDURE — 92012 PR EYE EXAM, EST PATIENT,INTERMED: ICD-10-PCS | Mod: S$GLB,,, | Performed by: OPTOMETRIST

## 2021-07-20 PROCEDURE — 92012 INTRM OPH EXAM EST PATIENT: CPT | Mod: S$GLB,,, | Performed by: OPTOMETRIST

## 2021-07-20 PROCEDURE — 99999 PR PBB SHADOW E&M-EST. PATIENT-LVL II: CPT | Mod: PBBFAC,,, | Performed by: OPTOMETRIST

## 2021-08-10 ENCOUNTER — TELEPHONE (OUTPATIENT)
Dept: FAMILY MEDICINE | Facility: CLINIC | Age: 71
End: 2021-08-10

## 2021-08-12 ENCOUNTER — HOSPITAL ENCOUNTER (OUTPATIENT)
Dept: RADIOLOGY | Facility: HOSPITAL | Age: 71
Discharge: HOME | End: 2021-08-12
Attending: NURSE PRACTITIONER
Payer: MEDICARE

## 2021-08-12 DIAGNOSIS — R10.11 ABDOMINAL PAIN, RIGHT UPPER QUADRANT: ICD-10-CM

## 2021-08-12 PROCEDURE — 76700 US EXAM ABDOM COMPLETE: CPT | Mod: 26,,, | Performed by: RADIOLOGY

## 2021-08-12 PROCEDURE — 76700 US EXAM ABDOM COMPLETE: CPT | Mod: TC

## 2021-08-12 PROCEDURE — 76700 US ABDOMEN COMPLETE: ICD-10-PCS | Mod: 26,,, | Performed by: RADIOLOGY

## 2021-08-18 ENCOUNTER — TELEPHONE (OUTPATIENT)
Dept: FAMILY MEDICINE | Facility: CLINIC | Age: 71
End: 2021-08-18

## 2021-08-18 ENCOUNTER — OFFICE VISIT (OUTPATIENT)
Dept: FAMILY MEDICINE | Facility: CLINIC | Age: 71
End: 2021-08-18
Payer: MEDICARE

## 2021-08-18 DIAGNOSIS — M85.80 OSTEOPENIA, UNSPECIFIED LOCATION: ICD-10-CM

## 2021-08-18 DIAGNOSIS — Z12.39 ENCOUNTER FOR SCREENING FOR MALIGNANT NEOPLASM OF BREAST, UNSPECIFIED SCREENING MODALITY: ICD-10-CM

## 2021-08-18 DIAGNOSIS — Z12.11 SCREENING FOR COLON CANCER: ICD-10-CM

## 2021-08-18 DIAGNOSIS — Z12.31 ENCOUNTER FOR SCREENING MAMMOGRAM FOR MALIGNANT NEOPLASM OF BREAST: ICD-10-CM

## 2021-08-18 DIAGNOSIS — I25.10 CORONARY ARTERY DISEASE, OCCLUSIVE: ICD-10-CM

## 2021-08-18 DIAGNOSIS — K80.12 CALCULUS OF GALLBLADDER WITH ACUTE ON CHRONIC CHOLECYSTITIS WITHOUT OBSTRUCTION: Primary | ICD-10-CM

## 2021-08-18 PROCEDURE — 99443 PR PHYSICIAN TELEPHONE EVALUATION 21-30 MIN: ICD-10-PCS | Mod: 95,,, | Performed by: FAMILY MEDICINE

## 2021-08-18 PROCEDURE — 99443 PR PHYSICIAN TELEPHONE EVALUATION 21-30 MIN: CPT | Mod: 95,,, | Performed by: FAMILY MEDICINE

## 2021-09-01 ENCOUNTER — HOSPITAL ENCOUNTER (OUTPATIENT)
Dept: CARDIOLOGY | Facility: HOSPITAL | Age: 71
Discharge: HOME | End: 2021-09-01
Attending: SURGERY
Payer: MEDICARE

## 2021-09-01 ENCOUNTER — OFFICE VISIT (OUTPATIENT)
Dept: SURGERY | Facility: CLINIC | Age: 71
End: 2021-09-01
Payer: MEDICARE

## 2021-09-01 VITALS
HEART RATE: 69 BPM | SYSTOLIC BLOOD PRESSURE: 115 MMHG | TEMPERATURE: 98 F | BODY MASS INDEX: 26.41 KG/M2 | DIASTOLIC BLOOD PRESSURE: 66 MMHG | WEIGHT: 153.88 LBS

## 2021-09-01 DIAGNOSIS — Z01.818 PRE-OP TESTING: ICD-10-CM

## 2021-09-01 DIAGNOSIS — K81.1 CHRONIC CHOLECYSTITIS: Primary | ICD-10-CM

## 2021-09-01 DIAGNOSIS — K81.1 CHRONIC CHOLECYSTITIS: ICD-10-CM

## 2021-09-01 PROCEDURE — 3078F DIAST BP <80 MM HG: CPT | Mod: CPTII,S$GLB,, | Performed by: SURGERY

## 2021-09-01 PROCEDURE — 1160F PR REVIEW ALL MEDS BY PRESCRIBER/CLIN PHARMACIST DOCUMENTED: ICD-10-PCS | Mod: CPTII,S$GLB,, | Performed by: SURGERY

## 2021-09-01 PROCEDURE — 93010 EKG 12-LEAD: ICD-10-PCS | Mod: ,,, | Performed by: INTERNAL MEDICINE

## 2021-09-01 PROCEDURE — 99204 OFFICE O/P NEW MOD 45 MIN: CPT | Mod: S$GLB,,, | Performed by: SURGERY

## 2021-09-01 PROCEDURE — 3074F PR MOST RECENT SYSTOLIC BLOOD PRESSURE < 130 MM HG: ICD-10-PCS | Mod: CPTII,S$GLB,, | Performed by: SURGERY

## 2021-09-01 PROCEDURE — 93005 ELECTROCARDIOGRAM TRACING: CPT

## 2021-09-01 PROCEDURE — 1126F AMNT PAIN NOTED NONE PRSNT: CPT | Mod: CPTII,S$GLB,, | Performed by: SURGERY

## 2021-09-01 PROCEDURE — 99999 PR PBB SHADOW E&M-EST. PATIENT-LVL IV: CPT | Mod: PBBFAC,,, | Performed by: SURGERY

## 2021-09-01 PROCEDURE — 93010 ELECTROCARDIOGRAM REPORT: CPT | Mod: ,,, | Performed by: INTERNAL MEDICINE

## 2021-09-01 PROCEDURE — 1126F PR PAIN SEVERITY QUANTIFIED, NO PAIN PRESENT: ICD-10-PCS | Mod: CPTII,S$GLB,, | Performed by: SURGERY

## 2021-09-01 PROCEDURE — 3074F SYST BP LT 130 MM HG: CPT | Mod: CPTII,S$GLB,, | Performed by: SURGERY

## 2021-09-01 PROCEDURE — 1159F MED LIST DOCD IN RCRD: CPT | Mod: CPTII,S$GLB,, | Performed by: SURGERY

## 2021-09-01 PROCEDURE — 1159F PR MEDICATION LIST DOCUMENTED IN MEDICAL RECORD: ICD-10-PCS | Mod: CPTII,S$GLB,, | Performed by: SURGERY

## 2021-09-01 PROCEDURE — 3008F PR BODY MASS INDEX (BMI) DOCUMENTED: ICD-10-PCS | Mod: CPTII,S$GLB,, | Performed by: SURGERY

## 2021-09-01 PROCEDURE — 99204 PR OFFICE/OUTPT VISIT, NEW, LEVL IV, 45-59 MIN: ICD-10-PCS | Mod: S$GLB,,, | Performed by: SURGERY

## 2021-09-01 PROCEDURE — 3008F BODY MASS INDEX DOCD: CPT | Mod: CPTII,S$GLB,, | Performed by: SURGERY

## 2021-09-01 PROCEDURE — 99999 PR PBB SHADOW E&M-EST. PATIENT-LVL IV: ICD-10-PCS | Mod: PBBFAC,,, | Performed by: SURGERY

## 2021-09-01 PROCEDURE — 1160F RVW MEDS BY RX/DR IN RCRD: CPT | Mod: CPTII,S$GLB,, | Performed by: SURGERY

## 2021-09-01 PROCEDURE — 3078F PR MOST RECENT DIASTOLIC BLOOD PRESSURE < 80 MM HG: ICD-10-PCS | Mod: CPTII,S$GLB,, | Performed by: SURGERY

## 2021-09-01 RX ORDER — LIDOCAINE HYDROCHLORIDE 10 MG/ML
1 INJECTION, SOLUTION EPIDURAL; INFILTRATION; INTRACAUDAL; PERINEURAL ONCE
Status: CANCELLED | OUTPATIENT
Start: 2021-09-01 | End: 2021-09-01

## 2021-09-01 RX ORDER — SODIUM CHLORIDE 9 MG/ML
INJECTION, SOLUTION INTRAVENOUS CONTINUOUS
Status: CANCELLED | OUTPATIENT
Start: 2021-09-01

## 2021-09-17 ENCOUNTER — TELEPHONE (OUTPATIENT)
Dept: PREADMISSION TESTING | Facility: HOSPITAL | Age: 71
End: 2021-09-17

## 2021-09-20 ENCOUNTER — OFFICE VISIT (OUTPATIENT)
Dept: TRANSPLANT | Facility: CLINIC | Age: 71
End: 2021-09-20
Payer: MEDICARE

## 2021-09-20 VITALS
WEIGHT: 154.13 LBS | HEART RATE: 62 BPM | OXYGEN SATURATION: 95 % | DIASTOLIC BLOOD PRESSURE: 65 MMHG | BODY MASS INDEX: 26.31 KG/M2 | SYSTOLIC BLOOD PRESSURE: 120 MMHG | HEIGHT: 64 IN

## 2021-09-20 DIAGNOSIS — Z98.61 HISTORY OF CORONARY ANGIOPLASTY: Chronic | ICD-10-CM

## 2021-09-20 DIAGNOSIS — E78.5 DYSLIPIDEMIA: Chronic | ICD-10-CM

## 2021-09-20 DIAGNOSIS — I48.92 ATRIAL FLUTTER, PAROXYSMAL: ICD-10-CM

## 2021-09-20 DIAGNOSIS — I25.10 CORONARY ARTERY DISEASE, OCCLUSIVE: Primary | ICD-10-CM

## 2021-09-20 PROCEDURE — 1160F RVW MEDS BY RX/DR IN RCRD: CPT | Mod: CPTII,S$GLB,, | Performed by: NUCLEAR MEDICINE

## 2021-09-20 PROCEDURE — 3074F SYST BP LT 130 MM HG: CPT | Mod: CPTII,S$GLB,, | Performed by: NUCLEAR MEDICINE

## 2021-09-20 PROCEDURE — 3078F PR MOST RECENT DIASTOLIC BLOOD PRESSURE < 80 MM HG: ICD-10-PCS | Mod: CPTII,S$GLB,, | Performed by: NUCLEAR MEDICINE

## 2021-09-20 PROCEDURE — 99999 PR PBB SHADOW E&M-EST. PATIENT-LVL III: ICD-10-PCS | Mod: PBBFAC,,, | Performed by: NUCLEAR MEDICINE

## 2021-09-20 PROCEDURE — 3008F PR BODY MASS INDEX (BMI) DOCUMENTED: ICD-10-PCS | Mod: CPTII,S$GLB,, | Performed by: NUCLEAR MEDICINE

## 2021-09-20 PROCEDURE — 3074F PR MOST RECENT SYSTOLIC BLOOD PRESSURE < 130 MM HG: ICD-10-PCS | Mod: CPTII,S$GLB,, | Performed by: NUCLEAR MEDICINE

## 2021-09-20 PROCEDURE — 3008F BODY MASS INDEX DOCD: CPT | Mod: CPTII,S$GLB,, | Performed by: NUCLEAR MEDICINE

## 2021-09-20 PROCEDURE — 1159F MED LIST DOCD IN RCRD: CPT | Mod: CPTII,S$GLB,, | Performed by: NUCLEAR MEDICINE

## 2021-09-20 PROCEDURE — 3078F DIAST BP <80 MM HG: CPT | Mod: CPTII,S$GLB,, | Performed by: NUCLEAR MEDICINE

## 2021-09-20 PROCEDURE — 1160F PR REVIEW ALL MEDS BY PRESCRIBER/CLIN PHARMACIST DOCUMENTED: ICD-10-PCS | Mod: CPTII,S$GLB,, | Performed by: NUCLEAR MEDICINE

## 2021-09-20 PROCEDURE — 1159F PR MEDICATION LIST DOCUMENTED IN MEDICAL RECORD: ICD-10-PCS | Mod: CPTII,S$GLB,, | Performed by: NUCLEAR MEDICINE

## 2021-09-20 PROCEDURE — 99999 PR PBB SHADOW E&M-EST. PATIENT-LVL III: CPT | Mod: PBBFAC,,, | Performed by: NUCLEAR MEDICINE

## 2021-09-20 PROCEDURE — 99214 OFFICE O/P EST MOD 30 MIN: CPT | Mod: S$GLB,,, | Performed by: NUCLEAR MEDICINE

## 2021-09-20 PROCEDURE — 99214 PR OFFICE/OUTPT VISIT, EST, LEVL IV, 30-39 MIN: ICD-10-PCS | Mod: S$GLB,,, | Performed by: NUCLEAR MEDICINE

## 2021-09-21 ENCOUNTER — LAB VISIT (OUTPATIENT)
Dept: PRIMARY CARE CLINIC | Facility: CLINIC | Age: 71
End: 2021-09-21
Payer: MEDICARE

## 2021-09-21 DIAGNOSIS — Z01.818 PRE-OP TESTING: ICD-10-CM

## 2021-09-21 PROCEDURE — U0005 INFEC AGEN DETEC AMPLI PROBE: HCPCS | Performed by: SURGERY

## 2021-09-21 PROCEDURE — U0003 INFECTIOUS AGENT DETECTION BY NUCLEIC ACID (DNA OR RNA); SEVERE ACUTE RESPIRATORY SYNDROME CORONAVIRUS 2 (SARS-COV-2) (CORONAVIRUS DISEASE [COVID-19]), AMPLIFIED PROBE TECHNIQUE, MAKING USE OF HIGH THROUGHPUT TECHNOLOGIES AS DESCRIBED BY CMS-2020-01-R: HCPCS | Performed by: SURGERY

## 2021-09-22 ENCOUNTER — ANESTHESIA EVENT (OUTPATIENT)
Dept: SURGERY | Facility: HOSPITAL | Age: 71
End: 2021-09-22
Payer: MEDICARE

## 2021-09-22 LAB
SARS-COV-2 RNA RESP QL NAA+PROBE: NOT DETECTED
SARS-COV-2- CYCLE NUMBER: NORMAL

## 2021-09-23 ENCOUNTER — TELEPHONE (OUTPATIENT)
Dept: PREADMISSION TESTING | Facility: HOSPITAL | Age: 71
End: 2021-09-23

## 2021-09-24 ENCOUNTER — ANESTHESIA (OUTPATIENT)
Dept: SURGERY | Facility: HOSPITAL | Age: 71
End: 2021-09-24
Payer: MEDICARE

## 2021-09-24 ENCOUNTER — HOSPITAL ENCOUNTER (OUTPATIENT)
Facility: HOSPITAL | Age: 71
Discharge: HOME | End: 2021-09-24
Attending: SURGERY | Admitting: SURGERY
Payer: MEDICARE

## 2021-09-24 VITALS
SYSTOLIC BLOOD PRESSURE: 119 MMHG | WEIGHT: 152.88 LBS | TEMPERATURE: 98 F | HEART RATE: 56 BPM | BODY MASS INDEX: 27.09 KG/M2 | HEIGHT: 63 IN | OXYGEN SATURATION: 98 % | DIASTOLIC BLOOD PRESSURE: 57 MMHG | RESPIRATION RATE: 18 BRPM

## 2021-09-24 DIAGNOSIS — K81.1 CHRONIC CHOLECYSTITIS: ICD-10-CM

## 2021-09-24 PROCEDURE — 88304 PR  SURG PATH,LEVEL III: ICD-10-PCS | Mod: 26,,, | Performed by: STUDENT IN AN ORGANIZED HEALTH CARE EDUCATION/TRAINING PROGRAM

## 2021-09-24 PROCEDURE — 71000015 HC POSTOP RECOV 1ST HR: Performed by: SURGERY

## 2021-09-24 PROCEDURE — 27201423 OPTIME MED/SURG SUP & DEVICES STERILE SUPPLY: Performed by: SURGERY

## 2021-09-24 PROCEDURE — 63600175 PHARM REV CODE 636 W HCPCS: Performed by: NURSE ANESTHETIST, CERTIFIED REGISTERED

## 2021-09-24 PROCEDURE — 37000008 HC ANESTHESIA 1ST 15 MINUTES: Performed by: SURGERY

## 2021-09-24 PROCEDURE — D9220A PRA ANESTHESIA: Mod: ,,, | Performed by: STUDENT IN AN ORGANIZED HEALTH CARE EDUCATION/TRAINING PROGRAM

## 2021-09-24 PROCEDURE — 88304 TISSUE EXAM BY PATHOLOGIST: CPT | Mod: 26,,, | Performed by: STUDENT IN AN ORGANIZED HEALTH CARE EDUCATION/TRAINING PROGRAM

## 2021-09-24 PROCEDURE — 71000033 HC RECOVERY, INTIAL HOUR: Performed by: SURGERY

## 2021-09-24 PROCEDURE — 25000003 PHARM REV CODE 250: Performed by: SURGERY

## 2021-09-24 PROCEDURE — 63600175 PHARM REV CODE 636 W HCPCS: Performed by: STUDENT IN AN ORGANIZED HEALTH CARE EDUCATION/TRAINING PROGRAM

## 2021-09-24 PROCEDURE — 71000016 HC POSTOP RECOV ADDL HR: Performed by: SURGERY

## 2021-09-24 PROCEDURE — 88304 TISSUE EXAM BY PATHOLOGIST: CPT | Performed by: STUDENT IN AN ORGANIZED HEALTH CARE EDUCATION/TRAINING PROGRAM

## 2021-09-24 PROCEDURE — 37000009 HC ANESTHESIA EA ADD 15 MINS: Performed by: SURGERY

## 2021-09-24 PROCEDURE — 63600175 PHARM REV CODE 636 W HCPCS: Performed by: SURGERY

## 2021-09-24 PROCEDURE — 47562 PR LAP,CHOLECYSTECTOMY: ICD-10-PCS | Mod: ,,, | Performed by: SURGERY

## 2021-09-24 PROCEDURE — D9220A PRA ANESTHESIA: ICD-10-PCS | Mod: ,,, | Performed by: STUDENT IN AN ORGANIZED HEALTH CARE EDUCATION/TRAINING PROGRAM

## 2021-09-24 PROCEDURE — 25000003 PHARM REV CODE 250: Performed by: NURSE ANESTHETIST, CERTIFIED REGISTERED

## 2021-09-24 PROCEDURE — 47562 LAPAROSCOPIC CHOLECYSTECTOMY: CPT | Mod: ,,, | Performed by: SURGERY

## 2021-09-24 PROCEDURE — 36000709 HC OR TIME LEV III EA ADD 15 MIN: Performed by: SURGERY

## 2021-09-24 PROCEDURE — 36000708 HC OR TIME LEV III 1ST 15 MIN: Performed by: SURGERY

## 2021-09-24 RX ORDER — HYDROCODONE BITARTRATE AND ACETAMINOPHEN 5; 325 MG/1; MG/1
2 TABLET ORAL EVERY 4 HOURS PRN
Qty: 20 TABLET | Refills: 0 | Status: SHIPPED | OUTPATIENT
Start: 2021-09-24 | End: 2022-02-15

## 2021-09-24 RX ORDER — ONDANSETRON 2 MG/ML
4 INJECTION INTRAMUSCULAR; INTRAVENOUS DAILY PRN
Status: DISCONTINUED | OUTPATIENT
Start: 2021-09-24 | End: 2021-09-24 | Stop reason: HOSPADM

## 2021-09-24 RX ORDER — SODIUM CHLORIDE 0.9 % (FLUSH) 0.9 %
10 SYRINGE (ML) INJECTION
Status: DISCONTINUED | OUTPATIENT
Start: 2021-09-24 | End: 2021-09-24 | Stop reason: HOSPADM

## 2021-09-24 RX ORDER — MIDAZOLAM HYDROCHLORIDE 1 MG/ML
INJECTION INTRAMUSCULAR; INTRAVENOUS
Status: DISCONTINUED | OUTPATIENT
Start: 2021-09-24 | End: 2021-09-24

## 2021-09-24 RX ORDER — ONDANSETRON 2 MG/ML
4 INJECTION INTRAMUSCULAR; INTRAVENOUS EVERY 12 HOURS PRN
Status: DISCONTINUED | OUTPATIENT
Start: 2021-09-24 | End: 2021-09-24 | Stop reason: HOSPADM

## 2021-09-24 RX ORDER — HYDROCODONE BITARTRATE AND ACETAMINOPHEN 10; 325 MG/1; MG/1
1 TABLET ORAL EVERY 4 HOURS PRN
Status: DISCONTINUED | OUTPATIENT
Start: 2021-09-24 | End: 2021-09-24 | Stop reason: HOSPADM

## 2021-09-24 RX ORDER — LIDOCAINE HYDROCHLORIDE 10 MG/ML
1 INJECTION, SOLUTION EPIDURAL; INFILTRATION; INTRACAUDAL; PERINEURAL ONCE
Status: DISCONTINUED | OUTPATIENT
Start: 2021-09-24 | End: 2021-09-24 | Stop reason: HOSPADM

## 2021-09-24 RX ORDER — LIDOCAINE HYDROCHLORIDE 20 MG/ML
INJECTION, SOLUTION EPIDURAL; INFILTRATION; INTRACAUDAL; PERINEURAL
Status: DISCONTINUED | OUTPATIENT
Start: 2021-09-24 | End: 2021-09-24

## 2021-09-24 RX ORDER — SODIUM CHLORIDE 9 MG/ML
INJECTION, SOLUTION INTRAVENOUS CONTINUOUS
Status: DISCONTINUED | OUTPATIENT
Start: 2021-09-24 | End: 2021-09-24 | Stop reason: HOSPADM

## 2021-09-24 RX ORDER — NEOSTIGMINE METHYLSULFATE 1 MG/ML
INJECTION, SOLUTION INTRAVENOUS
Status: DISCONTINUED | OUTPATIENT
Start: 2021-09-24 | End: 2021-09-24

## 2021-09-24 RX ORDER — LIDOCAINE HYDROCHLORIDE 10 MG/ML
INJECTION, SOLUTION EPIDURAL; INFILTRATION; INTRACAUDAL; PERINEURAL
Status: DISCONTINUED
Start: 2021-09-24 | End: 2021-09-24 | Stop reason: HOSPADM

## 2021-09-24 RX ORDER — DEXAMETHASONE SODIUM PHOSPHATE 4 MG/ML
INJECTION, SOLUTION INTRA-ARTICULAR; INTRALESIONAL; INTRAMUSCULAR; INTRAVENOUS; SOFT TISSUE
Status: DISCONTINUED | OUTPATIENT
Start: 2021-09-24 | End: 2021-09-24

## 2021-09-24 RX ORDER — SODIUM CHLORIDE, SODIUM LACTATE, POTASSIUM CHLORIDE, CALCIUM CHLORIDE 600; 310; 30; 20 MG/100ML; MG/100ML; MG/100ML; MG/100ML
INJECTION, SOLUTION INTRAVENOUS CONTINUOUS
Status: DISCONTINUED | OUTPATIENT
Start: 2021-09-24 | End: 2021-09-24 | Stop reason: HOSPADM

## 2021-09-24 RX ORDER — BUPIVACAINE HYDROCHLORIDE 2.5 MG/ML
INJECTION, SOLUTION EPIDURAL; INFILTRATION; INTRACAUDAL
Status: DISCONTINUED | OUTPATIENT
Start: 2021-09-24 | End: 2021-09-24 | Stop reason: HOSPADM

## 2021-09-24 RX ORDER — LIDOCAINE HYDROCHLORIDE 10 MG/ML
INJECTION, SOLUTION EPIDURAL; INFILTRATION; INTRACAUDAL; PERINEURAL
Status: DISCONTINUED | OUTPATIENT
Start: 2021-09-24 | End: 2021-09-24 | Stop reason: HOSPADM

## 2021-09-24 RX ORDER — ONDANSETRON HYDROCHLORIDE 2 MG/ML
INJECTION, SOLUTION INTRAMUSCULAR; INTRAVENOUS
Status: DISCONTINUED | OUTPATIENT
Start: 2021-09-24 | End: 2021-09-24

## 2021-09-24 RX ORDER — PROPOFOL 10 MG/ML
VIAL (ML) INTRAVENOUS
Status: DISCONTINUED | OUTPATIENT
Start: 2021-09-24 | End: 2021-09-24

## 2021-09-24 RX ORDER — BUPIVACAINE HYDROCHLORIDE 2.5 MG/ML
INJECTION, SOLUTION EPIDURAL; INFILTRATION; INTRACAUDAL
Status: DISCONTINUED
Start: 2021-09-24 | End: 2021-09-24 | Stop reason: HOSPADM

## 2021-09-24 RX ORDER — PHENYLEPHRINE HYDROCHLORIDE 10 MG/ML
INJECTION INTRAVENOUS
Status: DISCONTINUED | OUTPATIENT
Start: 2021-09-24 | End: 2021-09-24

## 2021-09-24 RX ORDER — FENTANYL CITRATE 50 UG/ML
INJECTION, SOLUTION INTRAMUSCULAR; INTRAVENOUS
Status: DISCONTINUED | OUTPATIENT
Start: 2021-09-24 | End: 2021-09-24

## 2021-09-24 RX ORDER — HYDROCODONE BITARTRATE AND ACETAMINOPHEN 5; 325 MG/1; MG/1
1 TABLET ORAL EVERY 4 HOURS PRN
Status: DISCONTINUED | OUTPATIENT
Start: 2021-09-24 | End: 2021-09-24 | Stop reason: HOSPADM

## 2021-09-24 RX ORDER — CEFAZOLIN SODIUM 2 G/50ML
2 SOLUTION INTRAVENOUS
Status: COMPLETED | OUTPATIENT
Start: 2021-09-24 | End: 2021-09-24

## 2021-09-24 RX ORDER — FENTANYL CITRATE 50 UG/ML
25 INJECTION, SOLUTION INTRAMUSCULAR; INTRAVENOUS EVERY 5 MIN PRN
Status: DISCONTINUED | OUTPATIENT
Start: 2021-09-24 | End: 2021-09-24 | Stop reason: HOSPADM

## 2021-09-24 RX ORDER — ROCURONIUM BROMIDE 10 MG/ML
INJECTION, SOLUTION INTRAVENOUS
Status: DISCONTINUED | OUTPATIENT
Start: 2021-09-24 | End: 2021-09-24

## 2021-09-24 RX ADMIN — MIDAZOLAM HYDROCHLORIDE 2 MG: 1 INJECTION INTRAMUSCULAR; INTRAVENOUS at 07:09

## 2021-09-24 RX ADMIN — HYDROCODONE BITARTRATE AND ACETAMINOPHEN 1 TABLET: 10; 325 TABLET ORAL at 09:09

## 2021-09-24 RX ADMIN — LIDOCAINE HYDROCHLORIDE 60 MG: 20 INJECTION, SOLUTION EPIDURAL; INFILTRATION; INTRACAUDAL; PERINEURAL at 07:09

## 2021-09-24 RX ADMIN — CEFAZOLIN SODIUM 2 G: 2 SOLUTION INTRAVENOUS at 07:09

## 2021-09-24 RX ADMIN — PHENYLEPHRINE HYDROCHLORIDE 100 MCG: 10 INJECTION INTRAVENOUS at 08:09

## 2021-09-24 RX ADMIN — PHENYLEPHRINE HYDROCHLORIDE 200 MCG: 10 INJECTION INTRAVENOUS at 07:09

## 2021-09-24 RX ADMIN — FENTANYL CITRATE 25 MCG: 50 INJECTION, SOLUTION INTRAMUSCULAR; INTRAVENOUS at 09:09

## 2021-09-24 RX ADMIN — FENTANYL CITRATE 100 MCG: 50 INJECTION, SOLUTION INTRAMUSCULAR; INTRAVENOUS at 07:09

## 2021-09-24 RX ADMIN — SODIUM CHLORIDE, SODIUM LACTATE, POTASSIUM CHLORIDE, AND CALCIUM CHLORIDE: 600; 310; 30; 20 INJECTION, SOLUTION INTRAVENOUS at 08:09

## 2021-09-24 RX ADMIN — SODIUM CHLORIDE, SODIUM LACTATE, POTASSIUM CHLORIDE, AND CALCIUM CHLORIDE: 600; 310; 30; 20 INJECTION, SOLUTION INTRAVENOUS at 06:09

## 2021-09-24 RX ADMIN — ROCURONIUM BROMIDE 50 MG: 10 INJECTION, SOLUTION INTRAVENOUS at 07:09

## 2021-09-24 RX ADMIN — PROPOFOL 150 MG: 10 INJECTION, EMULSION INTRAVENOUS at 07:09

## 2021-09-24 RX ADMIN — DEXAMETHASONE SODIUM PHOSPHATE 4 MG: 4 INJECTION, SOLUTION INTRA-ARTICULAR; INTRALESIONAL; INTRAMUSCULAR; INTRAVENOUS; SOFT TISSUE at 07:09

## 2021-09-24 RX ADMIN — GLYCOPYRROLATE 0.6 MG: 0.2 INJECTION, SOLUTION INTRAMUSCULAR; INTRAVITREAL at 08:09

## 2021-09-24 RX ADMIN — ONDANSETRON HYDROCHLORIDE 4 MG: 2 INJECTION, SOLUTION INTRAMUSCULAR; INTRAVENOUS at 07:09

## 2021-09-24 RX ADMIN — NEOSTIGMINE METHYLSULFATE 5 MG: 1 INJECTION INTRAVENOUS at 08:09

## 2021-10-04 LAB
FINAL PATHOLOGIC DIAGNOSIS: NORMAL
GROSS: NORMAL
Lab: NORMAL
MICROSCOPIC EXAM: NORMAL

## 2021-10-06 ENCOUNTER — OFFICE VISIT (OUTPATIENT)
Dept: SURGERY | Facility: CLINIC | Age: 71
End: 2021-10-06
Payer: MEDICARE

## 2021-10-06 VITALS
SYSTOLIC BLOOD PRESSURE: 104 MMHG | WEIGHT: 153.25 LBS | HEART RATE: 59 BPM | DIASTOLIC BLOOD PRESSURE: 58 MMHG | TEMPERATURE: 98 F | BODY MASS INDEX: 27.14 KG/M2

## 2021-10-06 DIAGNOSIS — K81.1 CHRONIC CHOLECYSTITIS: Primary | ICD-10-CM

## 2021-10-06 PROCEDURE — 99999 PR PBB SHADOW E&M-EST. PATIENT-LVL III: ICD-10-PCS | Mod: PBBFAC,HCNC,, | Performed by: SURGERY

## 2021-10-06 PROCEDURE — 1160F RVW MEDS BY RX/DR IN RCRD: CPT | Mod: HCNC,CPTII,S$GLB, | Performed by: SURGERY

## 2021-10-06 PROCEDURE — 1126F AMNT PAIN NOTED NONE PRSNT: CPT | Mod: HCNC,CPTII,S$GLB, | Performed by: SURGERY

## 2021-10-06 PROCEDURE — 3074F SYST BP LT 130 MM HG: CPT | Mod: HCNC,CPTII,S$GLB, | Performed by: SURGERY

## 2021-10-06 PROCEDURE — 99024 PR POST-OP FOLLOW-UP VISIT: ICD-10-PCS | Mod: HCNC,S$GLB,, | Performed by: SURGERY

## 2021-10-06 PROCEDURE — 99999 PR PBB SHADOW E&M-EST. PATIENT-LVL III: CPT | Mod: PBBFAC,HCNC,, | Performed by: SURGERY

## 2021-10-06 PROCEDURE — 3078F DIAST BP <80 MM HG: CPT | Mod: HCNC,CPTII,S$GLB, | Performed by: SURGERY

## 2021-10-06 PROCEDURE — 1160F PR REVIEW ALL MEDS BY PRESCRIBER/CLIN PHARMACIST DOCUMENTED: ICD-10-PCS | Mod: HCNC,CPTII,S$GLB, | Performed by: SURGERY

## 2021-10-06 PROCEDURE — 3074F PR MOST RECENT SYSTOLIC BLOOD PRESSURE < 130 MM HG: ICD-10-PCS | Mod: HCNC,CPTII,S$GLB, | Performed by: SURGERY

## 2021-10-06 PROCEDURE — 3008F BODY MASS INDEX DOCD: CPT | Mod: HCNC,CPTII,S$GLB, | Performed by: SURGERY

## 2021-10-06 PROCEDURE — 3008F PR BODY MASS INDEX (BMI) DOCUMENTED: ICD-10-PCS | Mod: HCNC,CPTII,S$GLB, | Performed by: SURGERY

## 2021-10-06 PROCEDURE — 99024 POSTOP FOLLOW-UP VISIT: CPT | Mod: HCNC,S$GLB,, | Performed by: SURGERY

## 2021-10-06 PROCEDURE — 1159F PR MEDICATION LIST DOCUMENTED IN MEDICAL RECORD: ICD-10-PCS | Mod: HCNC,CPTII,S$GLB, | Performed by: SURGERY

## 2021-10-06 PROCEDURE — 1126F PR PAIN SEVERITY QUANTIFIED, NO PAIN PRESENT: ICD-10-PCS | Mod: HCNC,CPTII,S$GLB, | Performed by: SURGERY

## 2021-10-06 PROCEDURE — 99499 RISK ADDL DX/OHS AUDIT: ICD-10-PCS | Mod: HCNC,S$GLB,, | Performed by: SURGERY

## 2021-10-06 PROCEDURE — 1159F MED LIST DOCD IN RCRD: CPT | Mod: HCNC,CPTII,S$GLB, | Performed by: SURGERY

## 2021-10-06 PROCEDURE — 3078F PR MOST RECENT DIASTOLIC BLOOD PRESSURE < 80 MM HG: ICD-10-PCS | Mod: HCNC,CPTII,S$GLB, | Performed by: SURGERY

## 2021-10-06 PROCEDURE — 99499 UNLISTED E&M SERVICE: CPT | Mod: HCNC,S$GLB,, | Performed by: SURGERY

## 2021-10-12 PROBLEM — K81.1 CHRONIC CHOLECYSTITIS: Status: RESOLVED | Noted: 2021-09-24 | Resolved: 2021-10-12

## 2021-11-07 ENCOUNTER — PATIENT OUTREACH (OUTPATIENT)
Dept: ADMINISTRATIVE | Facility: OTHER | Age: 71
End: 2021-11-07
Payer: MEDICARE

## 2021-11-09 ENCOUNTER — OFFICE VISIT (OUTPATIENT)
Dept: OPHTHALMOLOGY | Facility: CLINIC | Age: 71
End: 2021-11-09
Payer: MEDICARE

## 2021-11-09 DIAGNOSIS — H52.4 MYOPIA WITH ASTIGMATISM AND PRESBYOPIA, BILATERAL: ICD-10-CM

## 2021-11-09 DIAGNOSIS — H52.13 MYOPIA WITH ASTIGMATISM AND PRESBYOPIA, BILATERAL: ICD-10-CM

## 2021-11-09 DIAGNOSIS — H25.013 CORTICAL AGE-RELATED CATARACT OF BOTH EYES: ICD-10-CM

## 2021-11-09 DIAGNOSIS — H25.13 NUCLEAR SCLEROSIS, BILATERAL: Primary | ICD-10-CM

## 2021-11-09 DIAGNOSIS — H52.203 MYOPIA WITH ASTIGMATISM AND PRESBYOPIA, BILATERAL: ICD-10-CM

## 2021-11-09 PROCEDURE — 99999 PR PBB SHADOW E&M-EST. PATIENT-LVL II: CPT | Mod: PBBFAC,HCNC,, | Performed by: OPTOMETRIST

## 2021-11-09 PROCEDURE — 92014 PR EYE EXAM, EST PATIENT,COMPREHESV: ICD-10-PCS | Mod: HCNC,S$GLB,, | Performed by: OPTOMETRIST

## 2021-11-09 PROCEDURE — 99999 PR PBB SHADOW E&M-EST. PATIENT-LVL II: ICD-10-PCS | Mod: PBBFAC,HCNC,, | Performed by: OPTOMETRIST

## 2021-11-09 PROCEDURE — 1160F RVW MEDS BY RX/DR IN RCRD: CPT | Mod: HCNC,CPTII,S$GLB, | Performed by: OPTOMETRIST

## 2021-11-09 PROCEDURE — 1160F PR REVIEW ALL MEDS BY PRESCRIBER/CLIN PHARMACIST DOCUMENTED: ICD-10-PCS | Mod: HCNC,CPTII,S$GLB, | Performed by: OPTOMETRIST

## 2021-11-09 PROCEDURE — 1159F MED LIST DOCD IN RCRD: CPT | Mod: HCNC,CPTII,S$GLB, | Performed by: OPTOMETRIST

## 2021-11-09 PROCEDURE — 92014 COMPRE OPH EXAM EST PT 1/>: CPT | Mod: HCNC,S$GLB,, | Performed by: OPTOMETRIST

## 2021-11-09 PROCEDURE — 1159F PR MEDICATION LIST DOCUMENTED IN MEDICAL RECORD: ICD-10-PCS | Mod: HCNC,CPTII,S$GLB, | Performed by: OPTOMETRIST

## 2021-11-09 PROCEDURE — 92015 PR REFRACTION: ICD-10-PCS | Mod: HCNC,S$GLB,, | Performed by: OPTOMETRIST

## 2021-11-09 PROCEDURE — 92015 DETERMINE REFRACTIVE STATE: CPT | Mod: HCNC,S$GLB,, | Performed by: OPTOMETRIST

## 2021-11-10 LAB — HEMOCCULT STL QL IA: NEGATIVE

## 2021-11-16 ENCOUNTER — PATIENT MESSAGE (OUTPATIENT)
Dept: ADMINISTRATIVE | Facility: OTHER | Age: 71
End: 2021-11-16
Payer: MEDICARE

## 2021-12-14 ENCOUNTER — TELEPHONE (OUTPATIENT)
Dept: OPHTHALMOLOGY | Facility: CLINIC | Age: 71
End: 2021-12-14
Payer: MEDICARE

## 2021-12-23 ENCOUNTER — PATIENT OUTREACH (OUTPATIENT)
Dept: ADMINISTRATIVE | Facility: HOSPITAL | Age: 71
End: 2021-12-23
Payer: MEDICARE

## 2021-12-28 ENCOUNTER — PATIENT MESSAGE (OUTPATIENT)
Dept: CARDIOLOGY | Facility: CLINIC | Age: 71
End: 2021-12-28
Payer: MEDICARE

## 2022-01-05 ENCOUNTER — PATIENT OUTREACH (OUTPATIENT)
Dept: ADMINISTRATIVE | Facility: HOSPITAL | Age: 72
End: 2022-01-05
Payer: MEDICARE

## 2022-01-25 ENCOUNTER — PATIENT OUTREACH (OUTPATIENT)
Dept: ADMINISTRATIVE | Facility: OTHER | Age: 72
End: 2022-01-25
Payer: MEDICARE

## 2022-01-26 ENCOUNTER — OFFICE VISIT (OUTPATIENT)
Dept: OPHTHALMOLOGY | Facility: CLINIC | Age: 72
End: 2022-01-26
Payer: MEDICARE

## 2022-01-26 DIAGNOSIS — H25.12 SENILE NUCLEAR CATARACT, LEFT: ICD-10-CM

## 2022-01-26 DIAGNOSIS — H25.11 SENILE NUCLEAR CATARACT, RIGHT: Primary | ICD-10-CM

## 2022-01-26 PROCEDURE — 99204 OFFICE O/P NEW MOD 45 MIN: CPT | Mod: HCNC,S$GLB,, | Performed by: OPHTHALMOLOGY

## 2022-01-26 PROCEDURE — 1160F PR REVIEW ALL MEDS BY PRESCRIBER/CLIN PHARMACIST DOCUMENTED: ICD-10-PCS | Mod: HCNC,CPTII,S$GLB, | Performed by: OPHTHALMOLOGY

## 2022-01-26 PROCEDURE — 1159F MED LIST DOCD IN RCRD: CPT | Mod: HCNC,CPTII,S$GLB, | Performed by: OPHTHALMOLOGY

## 2022-01-26 PROCEDURE — 1159F PR MEDICATION LIST DOCUMENTED IN MEDICAL RECORD: ICD-10-PCS | Mod: HCNC,CPTII,S$GLB, | Performed by: OPHTHALMOLOGY

## 2022-01-26 PROCEDURE — 99999 PR PBB SHADOW E&M-EST. PATIENT-LVL II: CPT | Mod: PBBFAC,HCNC,, | Performed by: OPHTHALMOLOGY

## 2022-01-26 PROCEDURE — 99999 PR PBB SHADOW E&M-EST. PATIENT-LVL II: ICD-10-PCS | Mod: PBBFAC,HCNC,, | Performed by: OPHTHALMOLOGY

## 2022-01-26 PROCEDURE — 1160F RVW MEDS BY RX/DR IN RCRD: CPT | Mod: HCNC,CPTII,S$GLB, | Performed by: OPHTHALMOLOGY

## 2022-01-26 PROCEDURE — 99204 PR OFFICE/OUTPT VISIT, NEW, LEVL IV, 45-59 MIN: ICD-10-PCS | Mod: HCNC,S$GLB,, | Performed by: OPHTHALMOLOGY

## 2022-01-26 RX ORDER — TRAZODONE HYDROCHLORIDE 100 MG/1
TABLET ORAL
COMMUNITY
Start: 2021-12-02 | End: 2022-07-17

## 2022-01-26 NOTE — PROGRESS NOTES
HPI     Cataract     Comments: Ref by [facility] for Cat Eval              Comments     Referred by Dr Landry for cataract eval - patient has only been out of   contacts for 8 days at this time - she states that distance vision is   getting really blurry with glasses - having issues with night driving due   to glare from street lights and oncoming traffic          Last edited by Lauro Dye MD on 1/26/2022  9:39 AM. (History)            Assessment /Plan     For exam results, see Encounter Report.      ICD-10-CM ICD-9-CM    1. Senile nuclear cataract, right  H25.11 366.16    2. Senile nuclear cataract, left  H25.12 366.16        Visually Significant Cataract OU  Patient reports decreased vision consistent with the clinical amount of lenticular opacity, which reaches the level of visual significance and affects activities of daily living including reading and glare. Risks, benefits, and alternatives to cataract surgery were discussed.   The pt expressed a desire to proceed with surgery with the potential for some reasonable degree of visual improvement.      Would be dropless,     RETURN TO CLINIC for ASCAN Pre- OP will  pencil in sx for 2/17   Pt understands the importance of not wearing scls and she must be out od scls for at minimal of 2 weeks   She tried mono-vision cls with [facility] and never had Return to clinic with him to verify machado   Pt states she  liked multi-focal cls in the past more than the monovision        Meghan will call pt to set up ASCAN , pt will RETURN TO CLINIC  for Ascan and to luna k for k-spindle with pt not dilated   Would be Dropless

## 2022-01-26 NOTE — PROGRESS NOTES
Health Maintenance Due   Topic Date Due    Shingles Vaccine (1 of 2) Never done    Mammogram  03/03/2021    Influenza Vaccine (1) 09/01/2021    Pneumococcal Vaccines (Age 65+) (2 of 2 - PPSV23) 12/09/2021    COVID-19 Vaccine (2 - Inadvertent 3-dose series) 12/20/2021     Updates were requested from care everywhere.  Chart was reviewed for overdue Proactive [facility] Encounters (PABLITO) topics (CRS, Breast Cancer Screening, Eye exam)  Health Maintenance has been updated.  LINKS immunization registry triggered.  Immunizations were reconciled.

## 2022-01-27 ENCOUNTER — PATIENT MESSAGE (OUTPATIENT)
Dept: ENDOSCOPY | Facility: HOSPITAL | Age: 72
End: 2022-01-27
Payer: MEDICARE

## 2022-01-31 ENCOUNTER — PATIENT MESSAGE (OUTPATIENT)
Dept: ADMINISTRATIVE | Facility: OTHER | Age: 72
End: 2022-01-31
Payer: MEDICARE

## 2022-02-01 ENCOUNTER — TELEPHONE (OUTPATIENT)
Dept: OPHTHALMOLOGY | Facility: CLINIC | Age: 72
End: 2022-02-01
Payer: MEDICARE

## 2022-02-01 NOTE — TELEPHONE ENCOUNTER
Spoke with patient to let her know we are working to schedule another day for cat evals/ measurements and someone would contact her to set up time - she needs to stay out of contacts until next appointment -       ----- Message from Farhana Nguyen sent at 2/1/2022  4:00 PM CST -----  Pt would like to schedule cataract cons this is her 3rd time calling pls advise [phone]

## 2022-02-15 ENCOUNTER — OFFICE VISIT (OUTPATIENT)
Dept: OPHTHALMOLOGY | Facility: CLINIC | Age: 72
End: 2022-02-15
Payer: MEDICARE

## 2022-02-15 DIAGNOSIS — H25.12 SENILE NUCLEAR CATARACT, LEFT: Primary | ICD-10-CM

## 2022-02-15 DIAGNOSIS — H25.11 SENILE NUCLEAR CATARACT, RIGHT: ICD-10-CM

## 2022-02-15 PROCEDURE — 92136 OPHTHALMIC BIOMETRY: CPT | Mod: HCNC,LT,S$GLB, | Performed by: OPHTHALMOLOGY

## 2022-02-15 PROCEDURE — 99999 PR PBB SHADOW E&M-EST. PATIENT-LVL I: CPT | Mod: PBBFAC,HCNC,, | Performed by: OPHTHALMOLOGY

## 2022-02-15 PROCEDURE — 99499 UNLISTED E&M SERVICE: CPT | Mod: HCNC,S$GLB,, | Performed by: OPHTHALMOLOGY

## 2022-02-15 PROCEDURE — 99499 NO LOS: ICD-10-PCS | Mod: HCNC,S$GLB,, | Performed by: OPHTHALMOLOGY

## 2022-02-15 PROCEDURE — 1160F PR REVIEW ALL MEDS BY PRESCRIBER/CLIN PHARMACIST DOCUMENTED: ICD-10-PCS | Mod: HCNC,CPTII,S$GLB, | Performed by: OPHTHALMOLOGY

## 2022-02-15 PROCEDURE — 99999 PR PBB SHADOW E&M-EST. PATIENT-LVL I: ICD-10-PCS | Mod: PBBFAC,HCNC,, | Performed by: OPHTHALMOLOGY

## 2022-02-15 PROCEDURE — 92136 IOL MASTER - OS - LEFT EYE: ICD-10-PCS | Mod: HCNC,LT,S$GLB, | Performed by: OPHTHALMOLOGY

## 2022-02-15 PROCEDURE — 1160F RVW MEDS BY RX/DR IN RCRD: CPT | Mod: HCNC,CPTII,S$GLB, | Performed by: OPHTHALMOLOGY

## 2022-02-15 PROCEDURE — 1159F MED LIST DOCD IN RCRD: CPT | Mod: HCNC,CPTII,S$GLB, | Performed by: OPHTHALMOLOGY

## 2022-02-15 PROCEDURE — 1159F PR MEDICATION LIST DOCUMENTED IN MEDICAL RECORD: ICD-10-PCS | Mod: HCNC,CPTII,S$GLB, | Performed by: OPHTHALMOLOGY

## 2022-02-15 NOTE — PROGRESS NOTES
HPI     Here for repeat measurements    Last edited by Lauro Dye MD on 2/15/2022  5:05 PM. (History)            Assessment /Plan     For exam results, see Encounter Report.      ICD-10-CM ICD-9-CM    1. Senile nuclear cataract, left  H25.12 366.16 See below    2. Senile nuclear cataract, right  H25.11 366.16 Will set OD at Distance       Visually Significant Cataract OS > OD  Patient reports decreased vision consistent with the clinical amount of lenticular opacity, which reaches the level of visual significance and affects activities of daily living including reading and glare. Risks, benefits, and alternatives to cataract surgery were discussed.   The pt expressed a desire to proceed with surgery with the potential for some reasonable degree of visual improvement.    Discussed IOL options and refractive outcomes for this patient.    Phaco left eye, with ORA   Set for near aim -1.75   Block  toric IOL.  Will aim for near   Stop Plavix/ coumadin x 5 days prior surgery     DROPLESS surgery discussed and chosen - specifically that the eye may have a red area , usually under the lower lid that is due to a blood vessel breaking during the injection. Explained that the redness will resolve without complications usually over 7-10 days or so.     Explained that patient may need glasses after surgery.  Discussed that vision may be limited by astigmatism, mono vision outcome     Referral to [facility] for Ophthalmic surgery

## 2022-02-23 ENCOUNTER — DOCUMENTATION ONLY (OUTPATIENT)
Dept: OPHTHALMOLOGY | Facility: CLINIC | Age: 72
End: 2022-02-23
Payer: MEDICARE

## 2022-02-23 NOTE — PROGRESS NOTES
Short Stay Record    CC: Blurry Vision     Impression: Visually significant cataract with reasonable expectation for visual improvement Left Eye    External Exam     Right Left   External Normal Normal     Slit Lamp Exam     Right Left   Lids/Lashes Normal Normal   Conjunctiva/Sclera White and quiet White and quiet   Cornea 1+  to 2+  pigment on endo, not a spindle  1+  to 2+  pigment on endo, not a spindle    Anterior Chamber Deep and quiet Deep and quiet   Iris Round and reactive Round and reactive   Lens 2-3+ Nuclear sclerosis, 1+ Cortical spokes 2-3+ Nuclear sclerosis, 1+ Cortical spokes   Vitreous Normal PVD, broken Tam ring   Disc Normal Normal   C/D Ratio 0.25 0.35   Macula Mild Peripheral Drusen Mild Peripheral Drusen   Vessels Normal Normal   Periphery Normal, Flat & intact 360°, no holes, tears, RD Normal, Flat & intact 360°, no holes, tears, RD          Refraction      Wearing Rx     Sphere Cylinder Axis Add   Right -7.25 +1.75 110 +2.50   Left -5.00 +0.75 085 +2.50         Planned Procedure:   Phaco left eye, with ORA   Set for near aim -1.75   Block  toric IOL.  Will aim for near   Stop Plavix/ coumadin x 5 days prior surgery           Lens Selection OS verified    + _____           Previous IOL OD    N/A     Patient cleared for ophthalmic surgery.     Discharge Summary:    Admitting Diagnosis: Nuclear sclerotic cataract /  OS    Discharge Diagnosis: Pseudophakia OS    Procedure: Phacoemulsification of cataract with intraocular lens implant OS    Complications: None  Discharge Condition: Stable    Discharge instructions: Follow post-operative discharge instruction sheet given by provider.    Follow-up: Return to clinic next day or as scheduled.       HPI:  Shanda Marrero is a 71 y.o. female who presents for evaluation prior to ophthalmic surgery, left eye. Patient reports of blurred vision at distance and near with and without correction. Patient reports of glare at night reducing function and  safety, and complains of needed additional light to read.     Past Medical History:   Diagnosis Date    *Atrial flutter     PAROXYSMAL    Acute coronary syndrome     OLD INF MI    Coronary artery disease     Hyperlipidemia     Hypertension      Past Surgical History:   Procedure Laterality Date    CORONARY ANGIOPLASTY  09/2009    RCA AND LCX- LISA    GALLBLADDER SURGERY  10/2021    LAPAROSCOPIC CHOLECYSTECTOMY N/A 9/24/2021    Procedure: CHOLECYSTECTOMY, LAPAROSCOPIC;  Surgeon: Balwinder Castrejon MD;  Location: [facility];  Service: General;  Laterality: N/A;         Review of patient's allergies indicates:   Allergen Reactions    No known allergies          Current Outpatient Medications:     clopidogreL (PLAVIX) 75 mg tablet, TAKE 1 TABLET EVERY DAY, Disp: 90 tablet, Rfl: 3    levocetirizine (XYZAL) 5 MG tablet, Take 5 mg by mouth nightly., Disp: , Rfl: 3    metoprolol succinate (TOPROL-XL) 25 MG 24 hr tablet, TAKE 1 TABLET EVERY DAY, Disp: 90 tablet, Rfl: 3    omeprazole (PRILOSEC) 40 MG capsule, TAKE 1 CAPSULE EVERY DAY, Disp: 90 capsule, Rfl: 3    simvastatin (ZOCOR) 40 MG tablet, TAKE 1 TABLET EVERY EVENING., Disp: 90 tablet, Rfl: 3    traZODone (DESYREL) 100 MG tablet, , Disp: , Rfl:     Review of Systems:  A comprehensive review of systems was negative.    Physical Exam:  General Appearance:    A&Ox3, no distress, appears stated age   Head:    Normocephalic, without obvious abnormality, atraumatic   Eyes:    PERRL, EOM's intact   Back:     Symmetric, no curvature   Lungs:     Respirations unlabored   Chest Wall:    No tenderness or deformity    Heart:  Abdomen:  Extremities:  Skin:    S1 and S2 present    Soft, non-tender    Extremities normal, atraumatic    Skin color, texture, turgor normal

## 2022-03-03 ENCOUNTER — OUTSIDE PLACE OF SERVICE (OUTPATIENT)
Dept: OPHTHALMOLOGY | Facility: CLINIC | Age: 72
End: 2022-03-03

## 2022-03-03 ENCOUNTER — OUTSIDE PLACE OF SERVICE (OUTPATIENT)
Dept: OPHTHALMOLOGY | Facility: CLINIC | Age: 72
End: 2022-03-03
Payer: MEDICARE

## 2022-03-03 PROCEDURE — 66984 XCAPSL CTRC RMVL W/O ECP: CPT | Mod: LT,,, | Performed by: OPHTHALMOLOGY

## 2022-03-03 PROCEDURE — 66984 PR REMOVAL, CATARACT, W/INSRT INTRAOC LENS, W/O ENDO CYCLO: ICD-10-PCS | Mod: LT,,, | Performed by: OPHTHALMOLOGY

## 2022-03-04 ENCOUNTER — OFFICE VISIT (OUTPATIENT)
Dept: OPHTHALMOLOGY | Facility: CLINIC | Age: 72
End: 2022-03-04
Payer: MEDICARE

## 2022-03-04 DIAGNOSIS — Z98.42 CATARACT EXTRACTION STATUS, LEFT: ICD-10-CM

## 2022-03-04 DIAGNOSIS — Z98.890 POST-OPERATIVE STATE: Primary | ICD-10-CM

## 2022-03-04 PROCEDURE — 1160F RVW MEDS BY RX/DR IN RCRD: CPT | Mod: HCNC,CPTII,S$GLB, | Performed by: OPHTHALMOLOGY

## 2022-03-04 PROCEDURE — 1159F MED LIST DOCD IN RCRD: CPT | Mod: HCNC,CPTII,S$GLB, | Performed by: OPHTHALMOLOGY

## 2022-03-04 PROCEDURE — 99499 UNLISTED E&M SERVICE: CPT | Mod: CSM,,, | Performed by: OPHTHALMOLOGY

## 2022-03-04 PROCEDURE — 99499 PR MULTI-FOCAL IOL EVAL: ICD-10-PCS | Mod: CSM,,, | Performed by: OPHTHALMOLOGY

## 2022-03-04 PROCEDURE — 99024 POSTOP FOLLOW-UP VISIT: CPT | Mod: HCNC,S$GLB,, | Performed by: OPHTHALMOLOGY

## 2022-03-04 PROCEDURE — 99024 PR POST-OP FOLLOW-UP VISIT: ICD-10-PCS | Mod: HCNC,S$GLB,, | Performed by: OPHTHALMOLOGY

## 2022-03-04 PROCEDURE — 99999 PR PBB SHADOW E&M-EST. PATIENT-LVL II: CPT | Mod: PBBFAC,HCNC,, | Performed by: OPHTHALMOLOGY

## 2022-03-04 PROCEDURE — 1160F PR REVIEW ALL MEDS BY PRESCRIBER/CLIN PHARMACIST DOCUMENTED: ICD-10-PCS | Mod: HCNC,CPTII,S$GLB, | Performed by: OPHTHALMOLOGY

## 2022-03-04 PROCEDURE — 1159F PR MEDICATION LIST DOCUMENTED IN MEDICAL RECORD: ICD-10-PCS | Mod: HCNC,CPTII,S$GLB, | Performed by: OPHTHALMOLOGY

## 2022-03-04 PROCEDURE — 99999 PR PBB SHADOW E&M-EST. PATIENT-LVL II: ICD-10-PCS | Mod: PBBFAC,HCNC,, | Performed by: OPHTHALMOLOGY

## 2022-03-04 NOTE — PROGRESS NOTES
HPI     Post-op Evaluation     Comments:  PHACO OS with ORA (near) 3/3/22    Patient states OS is doing well, no pain or irritation.              Comments     1. PHACO OS with ORA (near) 3/3/22  CATS OD     SCLS wearer   Wore multi- focal in the past and then Mono-vision    OD DOMINANT  **PLAVIX** x 13 yrs      DROP LESS          Last edited by Afia Hawk, Patient Care Assistant on 3/4/2022  8:01   AM. (History)            Assessment /Plan     For exam results, see Encounter Report.      ICD-10-CM ICD-9-CM    1. Post-operative state  Z98.890 V45.89    2. Cataract extraction status, left  Z98.42 V45.61        PO Day 1 S/P Phaco/IOL  (Toric) left eye DROPLESS  Doing well.    Advised to use artificial tears and gel if needed for irritation    Reinstructed in importance of absolute compliance with Post-OP instructions including medications, shield at bedtime, and limitation of activities. Follow up appointments in approximately one and six weeks or call immediately for increased pain, redness or vision loss.     Can resume Plavix today

## 2022-03-11 ENCOUNTER — OFFICE VISIT (OUTPATIENT)
Dept: OPHTHALMOLOGY | Facility: CLINIC | Age: 72
End: 2022-03-11
Payer: MEDICARE

## 2022-03-11 DIAGNOSIS — Z98.42 CATARACT EXTRACTION STATUS, LEFT: ICD-10-CM

## 2022-03-11 DIAGNOSIS — H25.11 SENILE NUCLEAR CATARACT, RIGHT: ICD-10-CM

## 2022-03-11 DIAGNOSIS — Z98.890 POST-OPERATIVE STATE: Primary | ICD-10-CM

## 2022-03-11 PROCEDURE — 99999 PR PBB SHADOW E&M-EST. PATIENT-LVL III: ICD-10-PCS | Mod: PBBFAC,HCNC,, | Performed by: OPHTHALMOLOGY

## 2022-03-11 PROCEDURE — 1159F MED LIST DOCD IN RCRD: CPT | Mod: HCNC,CPTII,S$GLB, | Performed by: OPHTHALMOLOGY

## 2022-03-11 PROCEDURE — 1160F PR REVIEW ALL MEDS BY PRESCRIBER/CLIN PHARMACIST DOCUMENTED: ICD-10-PCS | Mod: HCNC,CPTII,S$GLB, | Performed by: OPHTHALMOLOGY

## 2022-03-11 PROCEDURE — 99999 PR PBB SHADOW E&M-EST. PATIENT-LVL III: CPT | Mod: PBBFAC,HCNC,, | Performed by: OPHTHALMOLOGY

## 2022-03-11 PROCEDURE — 92136 OPHTHALMIC BIOMETRY: CPT | Mod: 26,HCNC,RT,S$GLB | Performed by: OPHTHALMOLOGY

## 2022-03-11 PROCEDURE — 1160F RVW MEDS BY RX/DR IN RCRD: CPT | Mod: HCNC,CPTII,S$GLB, | Performed by: OPHTHALMOLOGY

## 2022-03-11 PROCEDURE — 99024 PR POST-OP FOLLOW-UP VISIT: ICD-10-PCS | Mod: HCNC,S$GLB,, | Performed by: OPHTHALMOLOGY

## 2022-03-11 PROCEDURE — 1159F PR MEDICATION LIST DOCUMENTED IN MEDICAL RECORD: ICD-10-PCS | Mod: HCNC,CPTII,S$GLB, | Performed by: OPHTHALMOLOGY

## 2022-03-11 PROCEDURE — 99024 POSTOP FOLLOW-UP VISIT: CPT | Mod: HCNC,S$GLB,, | Performed by: OPHTHALMOLOGY

## 2022-03-11 PROCEDURE — 92136 IOL MASTER - OD - RIGHT EYE: ICD-10-PCS | Mod: 26,HCNC,RT,S$GLB | Performed by: OPHTHALMOLOGY

## 2022-03-11 NOTE — PROGRESS NOTES
HPI     Post-op Evaluation     Comments: Pt here for 1 wk PCIOL OS PO. No pain or discomfort. VA stable.                 Comments     1. PHACO OS with ORA, + 16.0 SN6AT4 NRR on Ora (near) 3/3/22  CDE=13.93  CATS OD     SCLS wearer   Wore multi- focal in the past and then Mono-vision    OD DOMINANT  **PLAVIX** x 13 yrs      DROP LESS          Last edited by Adolfo Peter MA on 3/11/2022 11:09 AM. (History)            Assessment /Plan     For exam results, see Encounter Report.      ICD-10-CM ICD-9-CM    1. Post-operative state  Z98.890 V45.89 PO Week 1 S/P Phaco/ IOL left eye:   Doing well with no evidence of infection or abnormal inflammation.     Drop less   2. Cataract extraction status, left  Z98.42 V45.61 See above.   3. Senile nuclear cataract, right  H25.11 366.16 Patient reports decreased vision in the fellow eye consistent with the clinical amount of lenticular opacity, which reaches the level of visual significance and affects activities of daily living including reading and glare. Risks, benefits, and alternatives to cataract surgery were discussed and pt desired to schedule cataract surgery. Pt was consented and the biometry and lens options were reviewed.    Phaco right, +12.5 SN6AT6 @ 103 (yields -0.12)  NO ORA   Block   Requests a toric  IOL.  Will aim for distance  Stop Plavix/ coumadin x 5 days prior surgery     DROPLESS surgery discussed and chosen - specifically that the eye may have a red area , usually under the lower lid that is due to a blood vessel breaking during the injection. Explained that the redness will resolve without complications usually over 7-10 days or so.     Explained that patient may need glasses after surgery.  Discussed that vision may be limited by astigmatism, mono vision outcome.

## 2022-03-16 ENCOUNTER — DOCUMENTATION ONLY (OUTPATIENT)
Dept: OPHTHALMOLOGY | Facility: CLINIC | Age: 72
End: 2022-03-16
Payer: MEDICARE

## 2022-03-16 NOTE — PROGRESS NOTES
Short Stay Record    CC: Blurry Vision     Impression: Visually significant cataract with reasonable expectation for visual improvement Right Eye    Dist sc  20/50   Near sc  J1       Tonometry (Applanation, 11:15 AM)     Right Left   Pressure  16      Neuro/Psych    Oriented x3: Yes   Mood/Affect: Normal        Glare Testing (Transilluminator)     High   Right 20/80        Right Left   External Normal Normal        Right Left   Lids/Lashes Normal Normal   Conjunctiva/Sclera White and quiet White and quiet   Cornea 1+  to 2+  pigment on endo, not a spindle  1+  to 2+  pigment on endo, not a spindle    Anterior Chamber Deep and quiet Deep and quiet   Iris Round and reactive Round and reactive   Lens 2-3+ Nuclear sclerosis, 1+ Cortical spokes Posterior chamber intraocular lens   Vitreous Normal PVD, broken Tam ring       Manifest Refraction     Sphere Cylinder Dist VA   Right -7.25 +1.25 110   Left -2.00 Sphere 20/20   Edited by: Adolfo Peter MA         Planned Procedure:     Phaco right, +12.5 SN6AT6 @ 103 (yields -0.12)  NO ORA   Block   Requests a toric  IOL.  Will aim for distance  Stop Plavix/ coumadin x 5 days prior surgery      DROPLESS          Lens Selection OD verified _____           (Previous IOL OS +16.0 SN6AT4 W/ORA) NRR ON ORA    Patient cleared for ophthalmic surgery.     Discharge Summary:    Admitting Diagnosis: Nuclear sclerotic cataract / Cortical  OD    Discharge Diagnosis: Pseudophakia OD    Procedure: Phacoemulsification of cataract with intraocular lens implant OD    Complications: None  Discharge Condition: Stable    Discharge instructions: Follow post-operative discharge instruction sheet given by provider.    Follow-up: Return to clinic next day or as scheduled.       HPI:  Shanda Marrero is a 71 y.o. female who presents for evaluation prior to ophthalmic surgery, left eye. Patient reports of blurred vision at distance and near with and without correction. Patient reports of glare at  night reducing function and safety, and complains of needed additional light to read.     Past Medical History:   Diagnosis Date    *Atrial flutter     PAROXYSMAL    Acute coronary syndrome     OLD INF MI    Coronary artery disease     Hyperlipidemia     Hypertension      Past Surgical History:   Procedure Laterality Date    CATARACT EXTRACTION W/ INTRAOCULAR LENS IMPLANT Left 03/03/2022    Ora/ Toric    CORONARY ANGIOPLASTY  09/2009    RCA AND LCX- LISA    GALLBLADDER SURGERY  10/2021    LAPAROSCOPIC CHOLECYSTECTOMY N/A 09/24/2021    Procedure: CHOLECYSTECTOMY, LAPAROSCOPIC;  Surgeon: Balwinder Castrejon MD;  Location: [facility];  Service: General;  Laterality: N/A;       Review of patient's allergies indicates:   Allergen Reactions    No known allergies          Current Outpatient Medications:     clopidogreL (PLAVIX) 75 mg tablet, TAKE 1 TABLET EVERY DAY, Disp: 90 tablet, Rfl: 3    levocetirizine (XYZAL) 5 MG tablet, Take 5 mg by mouth nightly., Disp: , Rfl: 3    metoprolol succinate (TOPROL-XL) 25 MG 24 hr tablet, TAKE 1 TABLET EVERY DAY, Disp: 90 tablet, Rfl: 3    omeprazole (PRILOSEC) 40 MG capsule, TAKE 1 CAPSULE EVERY DAY, Disp: 90 capsule, Rfl: 3    simvastatin (ZOCOR) 40 MG tablet, TAKE 1 TABLET EVERY EVENING., Disp: 90 tablet, Rfl: 3    traZODone (DESYREL) 100 MG tablet, , Disp: , Rfl:     Review of Systems:  A comprehensive review of systems was negative.    Physical Exam:  General Appearance:    A&Ox3, no distress, appears stated age   Head:    Normocephalic, without obvious abnormality, atraumatic   Eyes:    PERRL, EOM's intact   Back:     Symmetric, no curvature   Lungs:     Respirations unlabored   Chest Wall:    No tenderness or deformity    Heart:  Abdomen:  Extremities:  Skin:    S1 and S2 present    Soft, non-tender    Extremities normal, atraumatic    Skin color, texture, turgor normal

## 2022-03-24 ENCOUNTER — OUTSIDE PLACE OF SERVICE (OUTPATIENT)
Dept: OPHTHALMOLOGY | Facility: CLINIC | Age: 72
End: 2022-03-24

## 2022-03-24 ENCOUNTER — OFFICE VISIT (OUTPATIENT)
Dept: OPHTHALMOLOGY | Facility: CLINIC | Age: 72
End: 2022-03-24
Payer: MEDICARE

## 2022-03-24 DIAGNOSIS — Z98.41 CATARACT EXTRACTION STATUS, RIGHT: ICD-10-CM

## 2022-03-24 DIAGNOSIS — Z98.890 POST-OPERATIVE STATE: Primary | ICD-10-CM

## 2022-03-24 PROCEDURE — 99999 PR PBB SHADOW E&M-EST. PATIENT-LVL I: ICD-10-PCS | Mod: PBBFAC,,, | Performed by: OPHTHALMOLOGY

## 2022-03-24 PROCEDURE — 1160F PR REVIEW ALL MEDS BY PRESCRIBER/CLIN PHARMACIST DOCUMENTED: ICD-10-PCS | Mod: CPTII,S$GLB,, | Performed by: OPHTHALMOLOGY

## 2022-03-24 PROCEDURE — 99499 PR MULTI-FOCAL IOL EVAL: ICD-10-PCS | Mod: CSM,RT,, | Performed by: OPHTHALMOLOGY

## 2022-03-24 PROCEDURE — 99499 UNLISTED E&M SERVICE: CPT | Mod: CSM,RT,, | Performed by: OPHTHALMOLOGY

## 2022-03-24 PROCEDURE — 99024 PR POST-OP FOLLOW-UP VISIT: ICD-10-PCS | Mod: S$GLB,,, | Performed by: OPHTHALMOLOGY

## 2022-03-24 PROCEDURE — 99024 POSTOP FOLLOW-UP VISIT: CPT | Mod: S$GLB,,, | Performed by: OPHTHALMOLOGY

## 2022-03-24 PROCEDURE — 99999 PR PBB SHADOW E&M-EST. PATIENT-LVL I: CPT | Mod: PBBFAC,,, | Performed by: OPHTHALMOLOGY

## 2022-03-24 PROCEDURE — 1159F MED LIST DOCD IN RCRD: CPT | Mod: CPTII,S$GLB,, | Performed by: OPHTHALMOLOGY

## 2022-03-24 PROCEDURE — 1159F PR MEDICATION LIST DOCUMENTED IN MEDICAL RECORD: ICD-10-PCS | Mod: CPTII,S$GLB,, | Performed by: OPHTHALMOLOGY

## 2022-03-24 PROCEDURE — 66984 XCAPSL CTRC RMVL W/O ECP: CPT | Mod: RT,,, | Performed by: OPHTHALMOLOGY

## 2022-03-24 PROCEDURE — 66984 PR REMOVAL, CATARACT, W/INSRT INTRAOC LENS, W/O ENDO CYCLO: ICD-10-PCS | Mod: RT,,, | Performed by: OPHTHALMOLOGY

## 2022-03-24 PROCEDURE — 1160F RVW MEDS BY RX/DR IN RCRD: CPT | Mod: CPTII,S$GLB,, | Performed by: OPHTHALMOLOGY

## 2022-03-24 NOTE — PROGRESS NOTES
REF FROM DNL      1. PHACO OS with ORA, + 16.0 SN6AT4 NRR on Ora (near) 3/3/22  CDE=13.93  PHACO OD 3/24/2022 TORIC     SCLS wearer   Wore multi- focal in the past and then Mono-vision    OD DOMINANT  **PLAVIX** x 13 yrs      DROP LESS      Assessment /Plan     For exam results, see Encounter Report.      ICD-10-CM ICD-9-CM    1. Post-operative state  Z98.890 V45.89    2. Cataract extraction status, right  Z98.41 V45.61        PO Day 1 S/P Phaco/IOL  right eye  Doing well.      DROP-LESS     Reinstructed in importance of absolute compliance with Post-OP instructions including medications, shield at bedtime, and limitation of activities. Follow up appointments in approximately one and six weeks or call immediately for increased pain, redness or vision loss.

## 2022-03-30 ENCOUNTER — OFFICE VISIT (OUTPATIENT)
Dept: CARDIOLOGY | Facility: CLINIC | Age: 72
End: 2022-03-30
Payer: MEDICARE

## 2022-03-30 VITALS
HEART RATE: 74 BPM | OXYGEN SATURATION: 97 % | WEIGHT: 145.5 LBS | BODY MASS INDEX: 25.77 KG/M2 | SYSTOLIC BLOOD PRESSURE: 102 MMHG | DIASTOLIC BLOOD PRESSURE: 74 MMHG

## 2022-03-30 DIAGNOSIS — Z98.61 HISTORY OF CORONARY ANGIOPLASTY: ICD-10-CM

## 2022-03-30 DIAGNOSIS — E78.5 DYSLIPIDEMIA: ICD-10-CM

## 2022-03-30 DIAGNOSIS — I10 BENIGN ESSENTIAL HTN: ICD-10-CM

## 2022-03-30 DIAGNOSIS — E78.5 HYPERLIPIDEMIA, UNSPECIFIED HYPERLIPIDEMIA TYPE: Primary | ICD-10-CM

## 2022-03-30 DIAGNOSIS — R06.09 DOE (DYSPNEA ON EXERTION): ICD-10-CM

## 2022-03-30 DIAGNOSIS — I25.2 HISTORY OF HEART ATTACK: ICD-10-CM

## 2022-03-30 PROCEDURE — 1159F PR MEDICATION LIST DOCUMENTED IN MEDICAL RECORD: ICD-10-PCS | Mod: CPTII,S$GLB,, | Performed by: INTERNAL MEDICINE

## 2022-03-30 PROCEDURE — 99999 PR PBB SHADOW E&M-EST. PATIENT-LVL III: CPT | Mod: PBBFAC,,, | Performed by: INTERNAL MEDICINE

## 2022-03-30 PROCEDURE — 1101F PR PT FALLS ASSESS DOC 0-1 FALLS W/OUT INJ PAST YR: ICD-10-PCS | Mod: CPTII,S$GLB,, | Performed by: INTERNAL MEDICINE

## 2022-03-30 PROCEDURE — 3008F BODY MASS INDEX DOCD: CPT | Mod: CPTII,S$GLB,, | Performed by: INTERNAL MEDICINE

## 2022-03-30 PROCEDURE — 99999 PR PBB SHADOW E&M-EST. PATIENT-LVL III: ICD-10-PCS | Mod: PBBFAC,,, | Performed by: INTERNAL MEDICINE

## 2022-03-30 PROCEDURE — 3288F FALL RISK ASSESSMENT DOCD: CPT | Mod: CPTII,S$GLB,, | Performed by: INTERNAL MEDICINE

## 2022-03-30 PROCEDURE — 3074F PR MOST RECENT SYSTOLIC BLOOD PRESSURE < 130 MM HG: ICD-10-PCS | Mod: CPTII,S$GLB,, | Performed by: INTERNAL MEDICINE

## 2022-03-30 PROCEDURE — 3288F PR FALLS RISK ASSESSMENT DOCUMENTED: ICD-10-PCS | Mod: CPTII,S$GLB,, | Performed by: INTERNAL MEDICINE

## 2022-03-30 PROCEDURE — 99214 OFFICE O/P EST MOD 30 MIN: CPT | Mod: S$GLB,,, | Performed by: INTERNAL MEDICINE

## 2022-03-30 PROCEDURE — 3078F DIAST BP <80 MM HG: CPT | Mod: CPTII,S$GLB,, | Performed by: INTERNAL MEDICINE

## 2022-03-30 PROCEDURE — 3008F PR BODY MASS INDEX (BMI) DOCUMENTED: ICD-10-PCS | Mod: CPTII,S$GLB,, | Performed by: INTERNAL MEDICINE

## 2022-03-30 PROCEDURE — 3078F PR MOST RECENT DIASTOLIC BLOOD PRESSURE < 80 MM HG: ICD-10-PCS | Mod: CPTII,S$GLB,, | Performed by: INTERNAL MEDICINE

## 2022-03-30 PROCEDURE — 3074F SYST BP LT 130 MM HG: CPT | Mod: CPTII,S$GLB,, | Performed by: INTERNAL MEDICINE

## 2022-03-30 PROCEDURE — 99214 PR OFFICE/OUTPT VISIT, EST, LEVL IV, 30-39 MIN: ICD-10-PCS | Mod: S$GLB,,, | Performed by: INTERNAL MEDICINE

## 2022-03-30 PROCEDURE — 1159F MED LIST DOCD IN RCRD: CPT | Mod: CPTII,S$GLB,, | Performed by: INTERNAL MEDICINE

## 2022-03-30 PROCEDURE — 1101F PT FALLS ASSESS-DOCD LE1/YR: CPT | Mod: CPTII,S$GLB,, | Performed by: INTERNAL MEDICINE

## 2022-03-30 NOTE — PROGRESS NOTES
Subjective:   Patient ID:  Shanda Marrero is a 71 y.o. female who presents for evaluation of No chief complaint on file.      HPI  70 yo female, ex smoker, had inferior stemi in  s/p rca stenting, followed by elective stenting of her circ, cath films under imaging   She used to see Dr León  Comes in for a routine 6 months follow up   Had her lap cholecystectomy last year , no issues   States only SIMPSON NYHA 1 , with walking up the stairs after 1 flight   But denies any chest pressure, chest pain or discomfort   Compliant with meds   No recent hx of ed visits or hospitalizations for acs or adhf  No orthopnea or pnd  No hx of palpitations. No exertional dizziness or syncope  No hx of dvt or pe. No hx of edema or intermittent claudication  No focal cns symptoms or signs to suggest tia or stroke  No hx of abnormal bleeding  Compliant with meds   Mention of aflutter in chart , but patient denies, not on any available ekg       Past Medical History:   Diagnosis Date    *Atrial flutter     PAROXYSMAL    Acute coronary syndrome     OLD INF MI    Coronary artery disease     Hyperlipidemia     Hypertension        Past Surgical History:   Procedure Laterality Date    CATARACT EXTRACTION W/ INTRAOCULAR LENS IMPLANT Left 2022    Ora/ Toric    CORONARY ANGIOPLASTY  2009    RCA AND LCX- LISA    GALLBLADDER SURGERY  10/2021    LAPAROSCOPIC CHOLECYSTECTOMY N/A 2021    Procedure: CHOLECYSTECTOMY, LAPAROSCOPIC;  Surgeon: Balwinder Castrejon MD;  Location: [facility];  Service: General;  Laterality: N/A;       Social History     Tobacco Use    Smoking status: Former Smoker     Quit date: 2009     Years since quittin.5    Smokeless tobacco: Never Used   Substance Use Topics    Alcohol use: Yes     Comment: occassionally    Drug use: No       Family History   Problem Relation Age of Onset    Heart attack Father     Heart disease Father        Review of Systems   Constitutional: Negative for fever and  malaise/fatigue.   HENT: Negative for sore throat.    Eyes: Negative for blurred vision.   Cardiovascular: Positive for dyspnea on exertion. Negative for chest pain, claudication, cyanosis, irregular heartbeat, leg swelling, near-syncope, orthopnea, palpitations, paroxysmal nocturnal dyspnea and syncope.   Respiratory: Negative for cough and hemoptysis.    Hematologic/Lymphatic: Negative for bleeding problem.   Skin: Negative for rash.   Musculoskeletal: Negative for falls.   Gastrointestinal: Negative for abdominal pain.   Genitourinary: Negative.    Neurological: Negative.    Psychiatric/Behavioral: Negative for altered mental status and substance abuse.       Current Outpatient Medications on File Prior to Visit   Medication Sig    clopidogreL (PLAVIX) 75 mg tablet TAKE 1 TABLET EVERY DAY    metoprolol succinate (TOPROL-XL) 25 MG 24 hr tablet TAKE 1 TABLET EVERY DAY    omeprazole (PRILOSEC) 40 MG capsule TAKE 1 CAPSULE EVERY DAY    simvastatin (ZOCOR) 40 MG tablet TAKE 1 TABLET EVERY EVENING.    levocetirizine (XYZAL) 5 MG tablet Take 5 mg by mouth nightly.    traZODone (DESYREL) 100 MG tablet      No current facility-administered medications on file prior to visit.       Objective:   Objective:  Wt Readings from Last 3 Encounters:   03/30/22 66 kg (145 lb 8.1 oz)   10/06/21 69.5 kg (153 lb 3.5 oz)   09/24/21 69.3 kg (152 lb 14.2 oz)     Temp Readings from Last 3 Encounters:   10/06/21 98 °F (36.7 °C) (Temporal)   09/24/21 98.2 °F (36.8 °C) (Temporal)   09/01/21 97.7 °F (36.5 °C) (Tympanic)     BP Readings from Last 3 Encounters:   03/30/22 102/74   10/06/21 (!) 104/58   09/24/21 (!) 119/57     Pulse Readings from Last 3 Encounters:   03/30/22 74   10/06/21 (!) 59   09/24/21 (!) 56       Physical Exam  Vitals reviewed.   Constitutional:       Appearance: She is well-developed.   HENT:      Head: Normocephalic and atraumatic.   Eyes:      General: No scleral icterus.     Conjunctiva/sclera: Conjunctivae  normal.   Cardiovascular:      Rate and Rhythm: Normal rate and regular rhythm.      Pulses: Intact distal pulses.      Heart sounds: Normal heart sounds. No murmur heard.  Pulmonary:      Effort: No respiratory distress.      Breath sounds: No wheezing or rales.   Chest:      Chest wall: No tenderness.   Abdominal:      General: Bowel sounds are normal. There is no distension.      Palpations: Abdomen is soft.      Tenderness: There is no guarding.   Musculoskeletal:         General: Normal range of motion.      Cervical back: Normal range of motion and neck supple.   Skin:     General: Skin is warm.   Neurological:      Mental Status: She is alert and oriented to person, place, and time.         Lab Results   Component Value Date    CHOL 194 12/11/2020    CHOL 181 10/19/2017    CHOL 177 09/12/2014     Lab Results   Component Value Date    HDL 53 12/11/2020    HDL 60 10/19/2017    HDL 48 09/12/2014     Lab Results   Component Value Date    LDLCALC 118.4 12/11/2020    LDLCALC 94.6 10/19/2017    LDLCALC 102.0 09/12/2014     Lab Results   Component Value Date    TRIG 113 12/11/2020    TRIG 132 10/19/2017    TRIG 135 09/12/2014     Lab Results   Component Value Date    CHOLHDL 27.3 12/11/2020    CHOLHDL 33.1 10/19/2017    CHOLHDL 27.1 09/12/2014       Chemistry        Component Value Date/Time     09/01/2021 1554    K 4.6 09/01/2021 1554     09/01/2021 1554    CO2 26 09/01/2021 1554    BUN 11 09/01/2021 1554    CREATININE 0.7 09/01/2021 1554    GLU 78 09/01/2021 1554        Component Value Date/Time    CALCIUM 9.6 09/01/2021 1554    ALKPHOS 100 09/01/2021 1554    AST 22 09/01/2021 1554    ALT 12 09/01/2021 1554    BILITOT 0.4 09/01/2021 1554    ESTGFRAFRICA >60.0 09/01/2021 1554    EGFRNONAA >60.0 09/01/2021 1554          Lab Results   Component Value Date    TSH 1.612 12/11/2020     Lab Results   Component Value Date    INR 1.0 10/16/2009    INR 1.0 09/21/2009     Lab Results   Component Value Date    WBC  6.01 09/01/2021    HGB 13.3 09/01/2021    HCT 42.5 09/01/2021    MCV 86 09/01/2021     09/01/2021     BNP  @LABRCNTIP(BNP,BNPTRIAGEBLO)@  CrCl cannot be calculated (Patient's most recent lab result is older than the maximum 7 days allowed.).     Imaging:  ======  No results found for this or any previous visit.    No results found for this or any previous visit.    No results found for this or any previous visit.    No results found for this or any previous visit.    No results found for this or any previous visit.    No valid procedures specified.    Diagnostic Results:  ECG: Reviewed  Normal sinus rhythm  Low voltage QRS  Nonspecific ST and T wave abnormality  Abnormal ECG  When compared with ECG of 03-SEP-2020 14:57,  No significant change was found  Confirmed by MD ABILIO, LEE ANN ([id number]) on 9/3/2021 9:45:47 A  The ASCVD Risk score (Gloria DC Jr., et al., 2013) failed to calculate for the following reasons:    The patient has a prior MI or stroke diagnosis    Assessment and Plan:   Hyperlipidemia, unspecified hyperlipidemia type  -     Lipid Panel; Future; Expected date: 03/30/2022    SIMPSON (dyspnea on exertion)  -     Echo; Future    History of coronary angioplasty    Dyslipidemia    History of heart attack    Benign essential HTN      CW PLAVIX, BB, STATIN   Repeat lipid panel , last LDL is 130   Reviewed all tests and above medical conditions with patient in detail and formulated treatment plan.  Risk factor modification discussed.   Cardiac low salt diet discussed.  Maintaining healthy weight and weight loss goals were discussed in clinic.  Reviewed angiogram films with the patient from 2009 , she never seen them before   Follow up in 6 Months

## 2022-03-31 ENCOUNTER — LAB VISIT (OUTPATIENT)
Dept: LAB | Facility: HOSPITAL | Age: 72
End: 2022-03-31
Attending: INTERNAL MEDICINE
Payer: MEDICARE

## 2022-03-31 DIAGNOSIS — E78.5 HYPERLIPIDEMIA, UNSPECIFIED HYPERLIPIDEMIA TYPE: ICD-10-CM

## 2022-03-31 LAB
CHOLEST SERPL-MCNC: 178 MG/DL (ref 120–199)
CHOLEST/HDLC SERPL: 2.7 {RATIO} (ref 2–5)
HDLC SERPL-MCNC: 66 MG/DL (ref 40–75)
HDLC SERPL: 37.1 % (ref 20–50)
LDLC SERPL CALC-MCNC: 98 MG/DL (ref 63–159)
NONHDLC SERPL-MCNC: 112 MG/DL
TRIGL SERPL-MCNC: 70 MG/DL (ref 30–150)

## 2022-03-31 PROCEDURE — 36415 COLL VENOUS BLD VENIPUNCTURE: CPT | Mod: PO | Performed by: INTERNAL MEDICINE

## 2022-03-31 PROCEDURE — 80061 LIPID PANEL: CPT | Performed by: INTERNAL MEDICINE

## 2022-04-01 ENCOUNTER — OFFICE VISIT (OUTPATIENT)
Dept: OPHTHALMOLOGY | Facility: CLINIC | Age: 72
End: 2022-04-01
Payer: MEDICARE

## 2022-04-01 DIAGNOSIS — Z98.41 CATARACT EXTRACTION STATUS, RIGHT: ICD-10-CM

## 2022-04-01 DIAGNOSIS — Z98.890 POST-OPERATIVE STATE: Primary | ICD-10-CM

## 2022-04-01 DIAGNOSIS — Z98.42 CATARACT EXTRACTION STATUS, LEFT: ICD-10-CM

## 2022-04-01 PROCEDURE — 1160F RVW MEDS BY RX/DR IN RCRD: CPT | Mod: CPTII,S$GLB,, | Performed by: OPHTHALMOLOGY

## 2022-04-01 PROCEDURE — 99999 PR PBB SHADOW E&M-EST. PATIENT-LVL II: ICD-10-PCS | Mod: PBBFAC,,, | Performed by: OPHTHALMOLOGY

## 2022-04-01 PROCEDURE — 99999 PR PBB SHADOW E&M-EST. PATIENT-LVL II: CPT | Mod: PBBFAC,,, | Performed by: OPHTHALMOLOGY

## 2022-04-01 PROCEDURE — 99024 PR POST-OP FOLLOW-UP VISIT: ICD-10-PCS | Mod: S$GLB,,, | Performed by: OPHTHALMOLOGY

## 2022-04-01 PROCEDURE — 99024 POSTOP FOLLOW-UP VISIT: CPT | Mod: S$GLB,,, | Performed by: OPHTHALMOLOGY

## 2022-04-01 PROCEDURE — 1160F PR REVIEW ALL MEDS BY PRESCRIBER/CLIN PHARMACIST DOCUMENTED: ICD-10-PCS | Mod: CPTII,S$GLB,, | Performed by: OPHTHALMOLOGY

## 2022-04-01 PROCEDURE — 1159F PR MEDICATION LIST DOCUMENTED IN MEDICAL RECORD: ICD-10-PCS | Mod: CPTII,S$GLB,, | Performed by: OPHTHALMOLOGY

## 2022-04-01 PROCEDURE — 1159F MED LIST DOCD IN RCRD: CPT | Mod: CPTII,S$GLB,, | Performed by: OPHTHALMOLOGY

## 2022-04-01 NOTE — PROGRESS NOTES
HPI     Post-op Evaluation     Comments: PC IOL OU              Comments     Patient states that va doing great since surgery OU - denies pain/   discomfort OU -       REFER BACK TO DNL      1. PHACO OS with ORA, + 16.0 SN6AT4 NRR on Ora (near) 3/3/22  CDE=13.93  PHACO OD 3/24/2022 TORIC / SN6AT6 12.5/ CDE: 10.47    SCLS wearer   Wore multi- focal in the past and then Mono-vision    OD DOMINANT  **PLAVIX** x 13 yrs      DROP LESS            Last edited by Kaylin Verduzco MA on 4/1/2022 10:24 AM. (History)            Assessment /Plan     For exam results, see Encounter Report.      ICD-10-CM ICD-9-CM    1. Post-operative state  Z98.890 V45.89    2. Cataract extraction status, right  Z98.41 V45.61    3. Cataract extraction status, left  Z98.42 V45.61        PO Week 1 S/P Phaco/ IOL bilateral eye:   successful mono vision   Doing well with no evidence of infection or abnormal inflammation.       Dropless   Resume normal activitites and d/c eye shield.  D/c Shield at night    Resume care with Dr. Landry     RTC 6 months  r PRN pain, redness, vision loss, or other concerns.

## 2022-04-21 ENCOUNTER — HOSPITAL ENCOUNTER (OUTPATIENT)
Dept: CARDIOLOGY | Facility: HOSPITAL | Age: 72
Discharge: HOME | End: 2022-04-21
Attending: INTERNAL MEDICINE
Payer: MEDICARE

## 2022-04-21 VITALS
HEART RATE: 55 BPM | WEIGHT: 145 LBS | DIASTOLIC BLOOD PRESSURE: 74 MMHG | BODY MASS INDEX: 25.69 KG/M2 | HEIGHT: 63 IN | SYSTOLIC BLOOD PRESSURE: 102 MMHG

## 2022-04-21 DIAGNOSIS — R06.09 DOE (DYSPNEA ON EXERTION): ICD-10-CM

## 2022-04-21 LAB
AORTIC ROOT ANNULUS: 2.67 CM
ASCENDING AORTA: 2.23 CM
AV INDEX (PROSTH): 0.48
AV MEAN GRADIENT: 6 MMHG
AV PEAK GRADIENT: 10 MMHG
AV VALVE AREA: 1.29 CM2
AV VELOCITY RATIO: 0.44
BSA FOR ECHO PROCEDURE: 1.71 M2
CV ECHO LV RWT: 0.4 CM
DOP CALC AO PEAK VEL: 1.6 M/S
DOP CALC AO VTI: 40.7 CM
DOP CALC LVOT AREA: 2.7 CM2
DOP CALC LVOT DIAMETER: 1.84 CM
DOP CALC LVOT PEAK VEL: 0.7 M/S
DOP CALC LVOT STROKE VOLUME: 52.36 CM3
DOP CALC RVOT PEAK VEL: 0.64 M/S
DOP CALC RVOT VTI: 17.5 CM
DOP CALCLVOT PEAK VEL VTI: 19.7 CM
E WAVE DECELERATION TIME: 204.32 MSEC
E/A RATIO: 1.13
E/E' RATIO: 7.42 M/S
ECHO EF ESTIMATED: 50 %
ECHO LV POSTERIOR WALL: 0.93 CM (ref 0.6–1.1)
EJECTION FRACTION: 45 %
FRACTIONAL SHORTENING: 25 % (ref 28–44)
INTERVENTRICULAR SEPTUM: 0.88 CM (ref 0.6–1.1)
IVRT: 77.07 MSEC
LA MAJOR: 3.91 CM
LA MINOR: 3.82 CM
LA WIDTH: 3.2 CM
LEFT ATRIUM SIZE: 2.88 CM
LEFT ATRIUM VOLUME INDEX MOD: 23 ML/M2
LEFT ATRIUM VOLUME INDEX: 17.9 ML/M2
LEFT ATRIUM VOLUME MOD: 38.95 CM3
LEFT ATRIUM VOLUME: 30.27 CM3
LEFT INTERNAL DIMENSION IN SYSTOLE: 3.48 CM (ref 2.1–4)
LEFT VENTRICLE DIASTOLIC VOLUME INDEX: 59.26 ML/M2
LEFT VENTRICLE DIASTOLIC VOLUME: 100.15 ML
LEFT VENTRICLE MASS INDEX: 84 G/M2
LEFT VENTRICLE SYSTOLIC VOLUME INDEX: 29.7 ML/M2
LEFT VENTRICLE SYSTOLIC VOLUME: 50.22 ML
LEFT VENTRICULAR INTERNAL DIMENSION IN DIASTOLE: 4.66 CM (ref 3.5–6)
LEFT VENTRICULAR MASS: 141.74 G
LV LATERAL E/E' RATIO: 6.36 M/S
LV SEPTAL E/E' RATIO: 8.9 M/S
LVOT MG: 1.27 MMHG
LVOT MV: 0.53 CM/S
MV PEAK A VEL: 0.79 M/S
MV PEAK E VEL: 0.89 M/S
MV STENOSIS PRESSURE HALF TIME: 59.25 MS
MV VALVE AREA P 1/2 METHOD: 3.71 CM2
PISA TR MAX VEL: 2.42 M/S
PULM VEIN S/D RATIO: 1.36
PV MEAN GRADIENT: 0.95 MMHG
PV PEAK D VEL: 0.35 M/S
PV PEAK S VEL: 0.48 M/S
PV PEAK VELOCITY: 0.85 CM/S
RA MAJOR: 4.38 CM
RA PRESSURE: 3 MMHG
RA WIDTH: 3 CM
RIGHT VENTRICULAR END-DIASTOLIC DIMENSION: 2.65 CM
SINUS: 2.53 CM
STJ: 2 CM
TDI LATERAL: 0.14 M/S
TDI SEPTAL: 0.1 M/S
TDI: 0.12 M/S
TR MAX PG: 23 MMHG
TV REST PULMONARY ARTERY PRESSURE: 26 MMHG

## 2022-04-21 PROCEDURE — 93306 TTE W/DOPPLER COMPLETE: CPT

## 2022-04-21 PROCEDURE — 93306 ECHO (CUPID ONLY): ICD-10-PCS | Mod: 26,,, | Performed by: STUDENT IN AN ORGANIZED HEALTH CARE EDUCATION/TRAINING PROGRAM

## 2022-04-21 PROCEDURE — 93306 TTE W/DOPPLER COMPLETE: CPT | Mod: 26,,, | Performed by: STUDENT IN AN ORGANIZED HEALTH CARE EDUCATION/TRAINING PROGRAM

## 2022-04-22 ENCOUNTER — PATIENT MESSAGE (OUTPATIENT)
Dept: CARDIOLOGY | Facility: CLINIC | Age: 72
End: 2022-04-22
Payer: MEDICARE

## 2022-04-22 ENCOUNTER — TELEPHONE (OUTPATIENT)
Dept: CARDIOLOGY | Facility: HOSPITAL | Age: 72
End: 2022-04-22
Payer: MEDICARE

## 2022-04-22 DIAGNOSIS — R93.1 REGIONAL WALL MOTION ABNORMALITY OF HEART: ICD-10-CM

## 2022-04-22 DIAGNOSIS — R93.1 ABNORMAL ECHOCARDIOGRAM: Primary | ICD-10-CM

## 2022-04-22 NOTE — TELEPHONE ENCOUNTER
Results for orders placed during the hospital encounter of 04/21/22    Echo    Interpretation Summary  · The left ventricle is normal in size with mildly decreased systolic function.  · Grade I left ventricular diastolic dysfunction.  · The estimated PA systolic pressure is 26 mmHg.  · Normal central venous pressure (3 mmHg).  · Mild tricuspid regurgitation.  · The estimated ejection fraction is 45-50%.  · There are segmental left ventricular wall motion abnormalities.  · There is mild aortic valve stenosis.  · Aortic valve area is 1.29 cm2; peak velocity is 1.6 m/s; mean gradient is 6 mmHg.  · Normal right ventricular size with normal right ventricular systolic function.      Reviewed echo results with the patient , reviewed old echoes , somewhat new changes, recent shows some Severe hypokinesis/akinesis of the inferobasilar area, to mild extent changed from before   Patient states only NYHA 1 , No chest pain   Will get a nuclear stress test

## 2022-04-26 ENCOUNTER — PATIENT MESSAGE (OUTPATIENT)
Dept: ADMINISTRATIVE | Facility: HOSPITAL | Age: 72
End: 2022-04-26
Payer: MEDICARE

## 2022-06-13 ENCOUNTER — HOSPITAL ENCOUNTER (OUTPATIENT)
Dept: RADIOLOGY | Facility: HOSPITAL | Age: 72
Discharge: HOME | End: 2022-06-13
Attending: INTERNAL MEDICINE
Payer: MEDICARE

## 2022-06-13 ENCOUNTER — HOSPITAL ENCOUNTER (OUTPATIENT)
Dept: PULMONOLOGY | Facility: HOSPITAL | Age: 72
Discharge: HOME | End: 2022-06-13
Attending: INTERNAL MEDICINE
Payer: MEDICARE

## 2022-06-13 VITALS
WEIGHT: 150 LBS | HEART RATE: 52 BPM | DIASTOLIC BLOOD PRESSURE: 70 MMHG | SYSTOLIC BLOOD PRESSURE: 148 MMHG | BODY MASS INDEX: 27.6 KG/M2 | HEIGHT: 62 IN

## 2022-06-13 DIAGNOSIS — R93.1 ABNORMAL ECHOCARDIOGRAM: ICD-10-CM

## 2022-06-13 DIAGNOSIS — R93.1 REGIONAL WALL MOTION ABNORMALITY OF HEART: ICD-10-CM

## 2022-06-13 LAB
CV STRESS BASE HR: 56 BPM
DIASTOLIC BLOOD PRESSURE: 70 MMHG
EJECTION FRACTION- HIGH: 73 %
END DIASTOLIC INDEX-HIGH: 165 ML/M2
END DIASTOLIC INDEX-LOW: 101 ML/M2
END SYSTOLIC INDEX-HIGH: 64 ML/M2
END SYSTOLIC INDEX-LOW: 28 ML/M2
NUC REST EJECTION FRACTION: 65
NUC STRESS EJECTION FRACTION: 73 %
OHS CV CPX 85 PERCENT MAX PREDICTED HEART RATE MALE: 123
OHS CV CPX MAX PREDICTED HEART RATE: 145
OHS CV CPX PATIENT IS FEMALE: 1
OHS CV CPX PATIENT IS MALE: 0
OHS CV CPX PEAK DIASTOLIC BLOOD PRESSURE: 70 MMHG
OHS CV CPX PEAK HEAR RATE: 92 BPM
OHS CV CPX PEAK RATE PRESSURE PRODUCT: NORMAL
OHS CV CPX PEAK SYSTOLIC BLOOD PRESSURE: 148 MMHG
OHS CV CPX PERCENT MAX PREDICTED HEART RATE ACHIEVED: 63
OHS CV CPX RATE PRESSURE PRODUCT PRESENTING: 8288
RETIRED EF AND QEF - SEE NOTES: 59 %
SYSTOLIC BLOOD PRESSURE: 148 MMHG

## 2022-06-13 PROCEDURE — A9502 TC99M TETROFOSMIN: HCPCS

## 2022-06-13 PROCEDURE — 63600175 PHARM REV CODE 636 W HCPCS: Performed by: INTERNAL MEDICINE

## 2022-06-13 RX ORDER — REGADENOSON 0.08 MG/ML
0.4 INJECTION, SOLUTION INTRAVENOUS ONCE
Status: COMPLETED | OUTPATIENT
Start: 2022-06-13 | End: 2022-06-13

## 2022-06-13 RX ADMIN — REGADENOSON 0.4 MG: 0.08 INJECTION, SOLUTION INTRAVENOUS at 02:06

## 2022-07-17 ENCOUNTER — HOSPITAL ENCOUNTER (INPATIENT)
Facility: HOSPITAL | Age: 72
LOS: 2 days | Discharge: HOME | DRG: 439 | End: 2022-07-19
Attending: EMERGENCY MEDICINE | Admitting: EMERGENCY MEDICINE
Payer: MEDICARE

## 2022-07-17 DIAGNOSIS — I25.10 CAD (CORONARY ARTERY DISEASE): ICD-10-CM

## 2022-07-17 DIAGNOSIS — N28.1 RENAL CYST: ICD-10-CM

## 2022-07-17 DIAGNOSIS — K85.10 ACUTE GALLSTONE PANCREATITIS: Primary | ICD-10-CM

## 2022-07-17 DIAGNOSIS — R91.1 PULMONARY NODULE: ICD-10-CM

## 2022-07-17 DIAGNOSIS — K80.50 CHOLEDOCHOLITHIASIS: ICD-10-CM

## 2022-07-17 PROBLEM — R79.89 ELEVATED LFTS: Status: ACTIVE | Noted: 2022-07-17

## 2022-07-17 LAB
ALBUMIN SERPL BCP-MCNC: 3.5 G/DL (ref 3.5–5.2)
ALP SERPL-CCNC: 405 U/L (ref 55–135)
ALT SERPL W/O P-5'-P-CCNC: 332 U/L (ref 10–44)
ANION GAP SERPL CALC-SCNC: 11 MMOL/L (ref 8–16)
AST SERPL-CCNC: 283 U/L (ref 10–40)
BASOPHILS # BLD AUTO: 0.01 K/UL (ref 0–0.2)
BASOPHILS NFR BLD: 0.1 % (ref 0–1.9)
BILIRUB SERPL-MCNC: 8.4 MG/DL (ref 0.1–1)
BILIRUB UR QL STRIP: ABNORMAL
BUN SERPL-MCNC: 5 MG/DL (ref 8–23)
CALCIUM SERPL-MCNC: 9.9 MG/DL (ref 8.7–10.5)
CHLORIDE SERPL-SCNC: 100 MMOL/L (ref 95–110)
CLARITY UR: CLEAR
CO2 SERPL-SCNC: 26 MMOL/L (ref 23–29)
COLOR UR: YELLOW
CREAT SERPL-MCNC: 0.6 MG/DL (ref 0.5–1.4)
CTP QC/QA: YES
DIFFERENTIAL METHOD: ABNORMAL
EOSINOPHIL # BLD AUTO: 0 K/UL (ref 0–0.5)
EOSINOPHIL NFR BLD: 0.4 % (ref 0–8)
ERYTHROCYTE [DISTWIDTH] IN BLOOD BY AUTOMATED COUNT: 14.4 % (ref 11.5–14.5)
EST. GFR  (AFRICAN AMERICAN): >60 ML/MIN/1.73 M^2
EST. GFR  (NON AFRICAN AMERICAN): >60 ML/MIN/1.73 M^2
GLUCOSE SERPL-MCNC: 132 MG/DL (ref 70–110)
GLUCOSE UR QL STRIP: NEGATIVE
HCT VFR BLD AUTO: 41.1 % (ref 37–48.5)
HGB BLD-MCNC: 13.7 G/DL (ref 12–16)
HGB UR QL STRIP: NEGATIVE
IMM GRANULOCYTES # BLD AUTO: 0.06 K/UL (ref 0–0.04)
IMM GRANULOCYTES NFR BLD AUTO: 0.6 % (ref 0–0.5)
KETONES UR QL STRIP: ABNORMAL
LEUKOCYTE ESTERASE UR QL STRIP: NEGATIVE
LIPASE SERPL-CCNC: >1000 U/L (ref 4–60)
LYMPHOCYTES # BLD AUTO: 0.4 K/UL (ref 1–4.8)
LYMPHOCYTES NFR BLD: 4.2 % (ref 18–48)
MCH RBC QN AUTO: 27.5 PG (ref 27–31)
MCHC RBC AUTO-ENTMCNC: 33.3 G/DL (ref 32–36)
MCV RBC AUTO: 82 FL (ref 82–98)
MONOCYTES # BLD AUTO: 0.5 K/UL (ref 0.3–1)
MONOCYTES NFR BLD: 5 % (ref 4–15)
NEUTROPHILS # BLD AUTO: 9.3 K/UL (ref 1.8–7.7)
NEUTROPHILS NFR BLD: 89.7 % (ref 38–73)
NITRITE UR QL STRIP: NEGATIVE
NRBC BLD-RTO: 0 /100 WBC
PH UR STRIP: 7 [PH] (ref 5–8)
PLATELET # BLD AUTO: 226 K/UL (ref 150–450)
PMV BLD AUTO: 10.5 FL (ref 9.2–12.9)
POTASSIUM SERPL-SCNC: 3.6 MMOL/L (ref 3.5–5.1)
PROT SERPL-MCNC: 7.3 G/DL (ref 6–8.4)
PROT UR QL STRIP: ABNORMAL
RBC # BLD AUTO: 4.99 M/UL (ref 4–5.4)
SARS-COV-2 RDRP RESP QL NAA+PROBE: NEGATIVE
SODIUM SERPL-SCNC: 137 MMOL/L (ref 136–145)
SP GR UR STRIP: 1.01 (ref 1–1.03)
URN SPEC COLLECT METH UR: ABNORMAL
UROBILINOGEN UR STRIP-ACNC: ABNORMAL EU/DL
WBC # BLD AUTO: 10.31 K/UL (ref 3.9–12.7)

## 2022-07-17 PROCEDURE — 81003 URINALYSIS AUTO W/O SCOPE: CPT | Performed by: EMERGENCY MEDICINE

## 2022-07-17 PROCEDURE — 85025 COMPLETE CBC W/AUTO DIFF WBC: CPT | Performed by: EMERGENCY MEDICINE

## 2022-07-17 PROCEDURE — 93010 ELECTROCARDIOGRAM REPORT: CPT | Mod: ,,, | Performed by: INTERNAL MEDICINE

## 2022-07-17 PROCEDURE — 83690 ASSAY OF LIPASE: CPT | Performed by: EMERGENCY MEDICINE

## 2022-07-17 PROCEDURE — 25000003 PHARM REV CODE 250: Performed by: EMERGENCY MEDICINE

## 2022-07-17 PROCEDURE — U0002 COVID-19 LAB TEST NON-CDC: HCPCS | Performed by: EMERGENCY MEDICINE

## 2022-07-17 PROCEDURE — 63600175 PHARM REV CODE 636 W HCPCS: Performed by: EMERGENCY MEDICINE

## 2022-07-17 PROCEDURE — 11000001 HC ACUTE MED/SURG PRIVATE ROOM

## 2022-07-17 PROCEDURE — 80053 COMPREHEN METABOLIC PANEL: CPT | Performed by: EMERGENCY MEDICINE

## 2022-07-17 PROCEDURE — 96376 TX/PRO/DX INJ SAME DRUG ADON: CPT

## 2022-07-17 PROCEDURE — 93010 EKG 12-LEAD: ICD-10-PCS | Mod: ,,, | Performed by: INTERNAL MEDICINE

## 2022-07-17 PROCEDURE — 96361 HYDRATE IV INFUSION ADD-ON: CPT

## 2022-07-17 PROCEDURE — 99285 EMERGENCY DEPT VISIT HI MDM: CPT | Mod: 25

## 2022-07-17 PROCEDURE — 93005 ELECTROCARDIOGRAM TRACING: CPT

## 2022-07-17 PROCEDURE — 63600175 PHARM REV CODE 636 W HCPCS: Performed by: NURSE PRACTITIONER

## 2022-07-17 PROCEDURE — 96375 TX/PRO/DX INJ NEW DRUG ADDON: CPT

## 2022-07-17 PROCEDURE — 96374 THER/PROPH/DIAG INJ IV PUSH: CPT

## 2022-07-17 RX ORDER — AMOXICILLIN AND CLAVULANATE POTASSIUM 875; 125 MG/1; MG/1
1 TABLET, FILM COATED ORAL 2 TIMES DAILY
COMMUNITY
Start: 2022-07-16 | End: 2022-07-17 | Stop reason: ALTCHOICE

## 2022-07-17 RX ORDER — ONDANSETRON 2 MG/ML
4 INJECTION INTRAMUSCULAR; INTRAVENOUS
Status: COMPLETED | OUTPATIENT
Start: 2022-07-17 | End: 2022-07-17

## 2022-07-17 RX ORDER — PANTOPRAZOLE SODIUM 40 MG/1
40 TABLET, DELAYED RELEASE ORAL DAILY
COMMUNITY
Start: 2022-07-16 | End: 2022-12-08

## 2022-07-17 RX ORDER — SUCRALFATE 1 G
1 TABLET ORAL 4 TIMES DAILY
COMMUNITY
Start: 2022-07-16 | End: 2022-10-03

## 2022-07-17 RX ORDER — MORPHINE SULFATE 4 MG/ML
4 INJECTION, SOLUTION INTRAMUSCULAR; INTRAVENOUS
Status: COMPLETED | OUTPATIENT
Start: 2022-07-17 | End: 2022-07-17

## 2022-07-17 RX ORDER — SODIUM CHLORIDE 9 MG/ML
1000 INJECTION, SOLUTION INTRAVENOUS CONTINUOUS
Status: ACTIVE | OUTPATIENT
Start: 2022-07-17 | End: 2022-07-17

## 2022-07-17 RX ORDER — KETOROLAC TROMETHAMINE 30 MG/ML
15 INJECTION, SOLUTION INTRAMUSCULAR; INTRAVENOUS
Status: COMPLETED | OUTPATIENT
Start: 2022-07-17 | End: 2022-07-17

## 2022-07-17 RX ORDER — MORPHINE SULFATE 2 MG/ML
2 INJECTION, SOLUTION INTRAMUSCULAR; INTRAVENOUS EVERY 4 HOURS PRN
Status: DISCONTINUED | OUTPATIENT
Start: 2022-07-17 | End: 2022-07-19 | Stop reason: HOSPADM

## 2022-07-17 RX ADMIN — MORPHINE SULFATE 4 MG: 4 INJECTION INTRAVENOUS at 06:07

## 2022-07-17 RX ADMIN — MORPHINE SULFATE 2 MG: 2 INJECTION, SOLUTION INTRAMUSCULAR; INTRAVENOUS at 07:07

## 2022-07-17 RX ADMIN — SODIUM CHLORIDE 1000 ML: 0.9 INJECTION, SOLUTION INTRAVENOUS at 06:07

## 2022-07-17 RX ADMIN — SODIUM CHLORIDE 1000 ML: 0.9 INJECTION, SOLUTION INTRAVENOUS at 03:07

## 2022-07-17 RX ADMIN — SODIUM CHLORIDE 1000 ML: 0.9 INJECTION, SOLUTION INTRAVENOUS at 11:07

## 2022-07-17 RX ADMIN — KETOROLAC TROMETHAMINE 15 MG: 30 INJECTION, SOLUTION INTRAMUSCULAR; INTRAVENOUS at 03:07

## 2022-07-17 RX ADMIN — ONDANSETRON 4 MG: 2 INJECTION INTRAMUSCULAR; INTRAVENOUS at 03:07

## 2022-07-17 RX ADMIN — MORPHINE SULFATE 4 MG: 4 INJECTION INTRAVENOUS at 11:07

## 2022-07-17 RX ADMIN — MORPHINE SULFATE 2 MG: 2 INJECTION, SOLUTION INTRAMUSCULAR; INTRAVENOUS at 03:07

## 2022-07-17 NOTE — SUBJECTIVE & OBJECTIVE
Past Medical History:   Diagnosis Date    *Atrial flutter     PAROXYSMAL    Acute coronary syndrome     OLD INF MI    Coronary artery disease     Hyperlipidemia     Hypertension        Past Surgical History:   Procedure Laterality Date    CATARACT EXTRACTION Left 2022    TORIC    CATARACT EXTRACTION Right 2022    Toric    CATARACT EXTRACTION W/ INTRAOCULAR LENS IMPLANT Left 2022    Ora/ Toric    CORONARY ANGIOPLASTY  2009    RCA AND LCX- LISA    GALLBLADDER SURGERY  10/2021    LAPAROSCOPIC CHOLECYSTECTOMY N/A 2021    Procedure: CHOLECYSTECTOMY, LAPAROSCOPIC;  Surgeon: Balwinder Castrejon MD;  Location: [facility];  Service: General;  Laterality: N/A;       Review of patient's allergies indicates:   Allergen Reactions    No known allergies        No current facility-administered medications on file prior to encounter.     Current Outpatient Medications on File Prior to Encounter   Medication Sig    CARAFATE 1 gram tablet Take 1 g by mouth 4 (four) times daily.    clopidogreL (PLAVIX) 75 mg tablet TAKE 1 TABLET EVERY DAY    metoprolol succinate (TOPROL-XL) 25 MG 24 hr tablet TAKE 1 TABLET EVERY DAY    omeprazole (PRILOSEC) 40 MG capsule TAKE 1 CAPSULE EVERY DAY    pantoprazole (PROTONIX) 40 MG tablet Take 40 mg by mouth once daily.    simvastatin (ZOCOR) 40 MG tablet TAKE 1 TABLET EVERY EVENING.    [DISCONTINUED] amoxicillin-clavulanate 875-125mg (AUGMENTIN) 875-125 mg per tablet Take 1 tablet by mouth 2 (two) times daily.    [DISCONTINUED] levocetirizine (XYZAL) 5 MG tablet Take 5 mg by mouth nightly.    [DISCONTINUED] traZODone (DESYREL) 100 MG tablet      Family History       Problem Relation (Age of Onset)    Heart attack Father    Heart disease Father          Tobacco Use    Smoking status: Former Smoker     Quit date: 2009     Years since quittin.8    Smokeless tobacco: Never Used   Substance and Sexual Activity    Alcohol use: Yes     Comment: occassionally    Drug use: No    Sexual  activity: Not on file     Review of Systems   Constitutional:  Positive for fatigue. Negative for chills and fever.   HENT:  Negative for congestion, rhinorrhea and sinus pressure.    Respiratory:  Negative for apnea, cough, choking, chest tightness, shortness of breath, wheezing and stridor.    Cardiovascular:  Negative for chest pain, palpitations and leg swelling.   Gastrointestinal:  Positive for abdominal pain, nausea (one episiode) and vomiting (one episiode). Negative for abdominal distention and diarrhea.   Endocrine: Negative for cold intolerance and heat intolerance.   Genitourinary:  Negative for difficulty urinating and hematuria.   Musculoskeletal:  Negative for arthralgias and joint swelling.   Skin:  Negative for color change, pallor and rash.   Neurological:  Negative for dizziness, seizures, weakness, numbness and headaches.   Psychiatric/Behavioral:  Negative for agitation. The patient is not nervous/anxious.    All other systems reviewed and are negative.  Objective:     Vital Signs (Most Recent):  Temp: 98.5 °F (36.9 °C) (07/17/22 1305)  Pulse: 84 (07/17/22 1305)  Resp: 18 (07/17/22 1305)  BP: (!) 147/65 (07/17/22 1305)  SpO2: (!) 92 % (07/17/22 1305)   Vital Signs (24h Range):  Temp:  [98 °F (36.7 °C)-98.5 °F (36.9 °C)] 98.5 °F (36.9 °C)  Pulse:  [78-84] 84  Resp:  [16-21] 18  SpO2:  [92 %-98 %] 92 %  BP: (126-188)/(65-92) 147/65     Weight: 68 kg (150 lb)  Body mass index is 27.44 kg/m².    Physical Exam  Vitals and nursing note reviewed.   HENT:      Head: Normocephalic and atraumatic.   Eyes:      General:         Right eye: No discharge.         Left eye: No discharge.   Cardiovascular:      Heart sounds: No murmur heard.    No friction rub. No gallop.   Pulmonary:      Effort: No respiratory distress.      Breath sounds: No stridor. No wheezing, rhonchi or rales.   Chest:      Chest wall: No tenderness.   Abdominal:      General: There is no distension.      Palpations: There is no mass.       Tenderness: There is abdominal tenderness. There is no right CVA tenderness, left CVA tenderness, guarding or rebound.      Hernia: No hernia is present.   Musculoskeletal:         General: No swelling, tenderness, deformity or signs of injury.      Right lower leg: No edema.   Skin:     Coloration: Skin is not jaundiced or pale.      Findings: No bruising, erythema, lesion or rash.   Neurological:      Cranial Nerves: No cranial nerve deficit.      Sensory: No sensory deficit.      Motor: Weakness present.      Coordination: Coordination normal.      Gait: Gait normal.      Deep Tendon Reflexes: Reflexes normal.           Significant Labs: All pertinent labs within the past 24 hours have been reviewed.  CBC:   Recent Labs   Lab 07/17/22 0326   WBC 10.31   HGB 13.7   HCT 41.1        CMP:   Recent Labs   Lab 07/17/22 0326      K 3.6      CO2 26   *   BUN 5*   CREATININE 0.6   CALCIUM 9.9   PROT 7.3   ALBUMIN 3.5   BILITOT 8.4*   ALKPHOS 405*   *   *   ANIONGAP 11   EGFRNONAA >60     Lipase:   Recent Labs   Lab 07/17/22 0326   LIPASE >1000*       Significant Imaging:     Imaging Results              CT Abdomen Pelvis  Without Contrast (Final result)  Result time 07/17/22 09:27:48      Final result by Nawaf Ferrer MD (07/17/22 09:27:48)                   Impression:      1.  Peripancreatic inflammatory changes most consistent with pancreatitis.  Marked dilation of the distal common bile duct measuring up to 2.3 cm is suggested with a 9 mm radiodensity concerning for choledocholithiasis.  Intrahepatic and extrahepatic ductal dilation noted as well.  The patient would benefit from an MRCP.    2. Multiple nodules in a bronchovascular distribution in the middle lobe measuring up to 8 mm.  Findings are most likely infectious or inflammatory nature.  Clinical correlation is advised.  Follow-up CT scan in 8-12 weeks recommended to re-evaluate after treatment of presumed  infectious or inflammatory process.    3.  Negative for acute process otherwise.  No other inflammatory changes are seen.  Normal appendix.  Negative for renal stone disease or hydronephrosis.  Negative for free air.    4.  Large hiatal hernia.  Coronary artery calcifications.  Old granulomatous disease.  Cholecystectomy clips.  2.1 cm left renal cyst.  Bilateral extrarenal pelves.  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  Degenerative changes of the spine and pelvis.  Scattered colonic diverticula.  Other nonemergent findings as described above.    5.  I agree with the preliminary interpretation rendered.    All CT scans at this facility are performed  using dose modulation techniques as appropriate to performed exam including the following:  automated exposure control; adjustment of mA and/or kV according to the patients size (this includes techniques or standardized protocols for targeted exams where dose is matched to indication/reason for exam: i.e. extremities or head);  iterative reconstruction technique.      Electronically signed by: Nawaf Ferrer MD  Date:    07/17/2022  Time:    09:27               Narrative:    EXAMINATION:  CT ABDOMEN PELVIS WITHOUT CONTRAST, multiplanar reconstructions    CLINICAL HISTORY:  Abdominal abscess/infection suspected;    TECHNIQUE:  Axial images through the abdomen and pelvis were obtained without the use of IV contrast.  Sagittal and coronal reconstructions are provided for review.  Oral contrast was not utilized.    COMPARISON:  Abdominal ultrasound from August 12, 2021    FINDINGS:  LUNG BASES: There are numerous nodules in a bronchovascular distribution within the middle lobe, measuring up to 8 mm in size.  Dependent atelectasis in the lung bases.  Left lower lobe calcified granuloma.  Lung bases are otherwise clear.  Negative for pleural or pericardial effusions. The distal esophagus is normal.  Large hiatal hernia.  Coronary artery calcifications.  Calcified left hilar  lymph nodes.    ABDOMEN: There are inflammatory changes along the pancreas.  Status post cholecystectomy.  Mild intrahepatic and extrahepatic ductal dilation with the common bile duct measuring 1.1 cm.  The distal common bile duct appears to be markedly dilated to 2.3 cm with a 9 mm radiodensity in the dilated distal common bile duct.  Calcified granuloma within the spleen.  2.1 cm medial left renal cyst.  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  Bilateral extrarenal pelves.  The liver and spleen otherwise appear normal.  The pancreas is otherwise normal.  Kidneys and adrenal glands are otherwise normal.    Negative for adenopathy, or ascites noted within the abdomen or pelvis.  Vascular calcifications are present without aneurysmal changes.    The bowel appears normal. Normal appendix.  Negative for free air.  Scattered colonic diverticula.    PELVIS: The urinary bladder is unremarkable.  The uterus is tilted to the left.  The female pelvic organs are otherwise normal.  There are pelvic phleboliths.    Tiny fat filled umbilical hernia.  The abdominal wall is otherwise intact.    No significant osseous abnormality is identified.  Negative for significant spinal canal stenosis. Osteopenia.  Multilevel marginal spondylosis and degenerative facet arthropathy.  Multilevel Schmorl node formation.  Convex-left curvature of the lower lumbar spine.  Mild degenerative changes of the pelvis.    Negative for groin adenopathy.

## 2022-07-17 NOTE — SUBJECTIVE & OBJECTIVE
Past Medical History:   Diagnosis Date    *Atrial flutter     PAROXYSMAL    Acute coronary syndrome     OLD INF MI    Coronary artery disease     Hyperlipidemia     Hypertension        Past Surgical History:   Procedure Laterality Date    CATARACT EXTRACTION Left 2022    TORIC    CATARACT EXTRACTION Right 2022    Toric    CATARACT EXTRACTION W/ INTRAOCULAR LENS IMPLANT Left 2022    Ora/ Toric    CORONARY ANGIOPLASTY  2009    RCA AND LCX- LISA    GALLBLADDER SURGERY  10/2021    LAPAROSCOPIC CHOLECYSTECTOMY N/A 2021    Procedure: CHOLECYSTECTOMY, LAPAROSCOPIC;  Surgeon: Balwinder Castrejon MD;  Location: [facility];  Service: General;  Laterality: N/A;       Review of patient's allergies indicates:   Allergen Reactions    No known allergies      Family History       Problem Relation (Age of Onset)    Heart attack Father    Heart disease Father          Tobacco Use    Smoking status: Former Smoker     Quit date: 2009     Years since quittin.8    Smokeless tobacco: Never Used   Substance and Sexual Activity    Alcohol use: Yes     Comment: occassionally    Drug use: No    Sexual activity: Not on file     Review of Systems   All other systems reviewed and are negative.  Objective:     Vital Signs (Most Recent):  Temp: 98 °F (36.7 °C) (22)  Pulse: 84 (22)  Resp: 17 (22)  BP: (!) 165/70 (22)  SpO2: 97 % (22)   Vital Signs (24h Range):  Temp:  [98 °F (36.7 °C)] 98 °F (36.7 °C)  Pulse:  [78-84] 84  Resp:  [16-17] 17  SpO2:  [93 %-98 %] 97 %  BP: (126-166)/(69-92) 165/70     Weight: 68 kg (150 lb) (22)  Body mass index is 27.44 kg/m².      Intake/Output Summary (Last 24 hours) at 2022 1145  Last data filed at 2022 0750  Gross per 24 hour   Intake 2000 ml   Output --   Net 2000 ml       Lines/Drains/Airways       Peripheral Intravenous Line  Duration                  Peripheral IV - Single Lumen 22 0324 20 G Left  Antecubital <1 day                    Physical Exam  Constitutional:       Appearance: Normal appearance.   HENT:      Head: Normocephalic and atraumatic.   Eyes:      General: No scleral icterus.     Extraocular Movements: Extraocular movements intact.   Cardiovascular:      Rate and Rhythm: Normal rate and regular rhythm.      Pulses: Normal pulses.      Heart sounds: Normal heart sounds.   Pulmonary:      Effort: Pulmonary effort is normal.      Breath sounds: Normal breath sounds. No wheezing.   Abdominal:      General: Bowel sounds are normal. There is no distension.      Palpations: Abdomen is soft.      Tenderness: There is abdominal tenderness.   Musculoskeletal:         General: No swelling or tenderness.   Skin:     General: Skin is warm and dry.      Coloration: Skin is not jaundiced.   Neurological:      General: No focal deficit present.      Mental Status: She is alert and oriented to person, place, and time.       Significant Labs:  CBC:   Recent Labs   Lab 07/17/22  0326   WBC 10.31   HGB 13.7   HCT 41.1        CMP:   Recent Labs   Lab 07/17/22  0326   *   CALCIUM 9.9   ALBUMIN 3.5   PROT 7.3      K 3.6   CO2 26      BUN 5*   CREATININE 0.6   ALKPHOS 405*   *   *   BILITOT 8.4*       Significant Imaging:  CT: I have reviewed all results within the past 24 hours and my personal findings are:  stone in CBD

## 2022-07-17 NOTE — ASSESSMENT & PLAN NOTE
Continue B blocker and Plavix when tolerating PO   Patient currently not on oral anticoagulation at home

## 2022-07-17 NOTE — CONSULTS
[facility] - Emergency Dept.  Gastroenterology  Consult Note    Patient Name: Shanda Marrero  MRN: [id number]  Admission Date: 7/17/2022  Hospital Length of Stay: 0 days  Code Status: Prior   Attending Provider: Lanre Bernal MD   Consulting Provider: Dina Patel MD  Primary Care Physician: Jean Vasques MD  Principal Problem:<principal problem not specified>    Inpatient consult to Gastroenterology  Consult performed by: Dina Patel MD  Consult ordered by: Lanre Bernal MD        Subjective:     HPI:   PMHx of atrial flutter, acute coronary syndrome coronary artery disease s/p stents (on plavix), hyperlipidemia, and HTN presents with abdominal pain.  Pain started about a week ago but was intermittent and mild.  She then developed severe pain Friday night that worsened.  She awoke from sleep with pain and vomiting and therefore called EMS.  In ER, CT scan done showed stone in CBD and labs show elevated lipase, AST/ALT and bilirubin.  She has history of cholecystectomy one year ago.  Pt was given morphine in ER and says that helped her pain some.  Currently denies nausea, vomiting or diarrhea.        Past Medical History:   Diagnosis Date    *Atrial flutter     PAROXYSMAL    Acute coronary syndrome     OLD INF MI    Coronary artery disease     Hyperlipidemia     Hypertension        Past Surgical History:   Procedure Laterality Date    CATARACT EXTRACTION Left 03/03/2022    TORIC    CATARACT EXTRACTION Right 03/24/2022    Toric    CATARACT EXTRACTION W/ INTRAOCULAR LENS IMPLANT Left 03/03/2022    Ora/ Toric    CORONARY ANGIOPLASTY  09/2009    RCA AND LCX- LISA    GALLBLADDER SURGERY  10/2021    LAPAROSCOPIC CHOLECYSTECTOMY N/A 09/24/2021    Procedure: CHOLECYSTECTOMY, LAPAROSCOPIC;  Surgeon: Balwinder Castrejon MD;  Location: [facility];  Service: General;  Laterality: N/A;       Review of patient's allergies indicates:   Allergen Reactions    No known allergies      Family History       Problem Relation (Age  of Onset)    Heart attack Father    Heart disease Father          Tobacco Use    Smoking status: Former Smoker     Quit date: 2009     Years since quittin.8    Smokeless tobacco: Never Used   Substance and Sexual Activity    Alcohol use: Yes     Comment: occassionally    Drug use: No    Sexual activity: Not on file     Review of Systems   All other systems reviewed and are negative.  Objective:     Vital Signs (Most Recent):  Temp: 98 °F (36.7 °C) (22)  Pulse: 84 (22)  Resp: 17 (22)  BP: (!) 165/70 (22)  SpO2: 97 % (22)   Vital Signs (24h Range):  Temp:  [98 °F (36.7 °C)] 98 °F (36.7 °C)  Pulse:  [78-84] 84  Resp:  [16-17] 17  SpO2:  [93 %-98 %] 97 %  BP: (126-166)/(69-92) 165/70     Weight: 68 kg (150 lb) (22)  Body mass index is 27.44 kg/m².      Intake/Output Summary (Last 24 hours) at 2022 1145  Last data filed at 2022 0750  Gross per 24 hour   Intake 2000 ml   Output --   Net 2000 ml       Lines/Drains/Airways       Peripheral Intravenous Line  Duration                  Peripheral IV - Single Lumen 22 0324 20 G Left Antecubital <1 day                    Physical Exam  Constitutional:       Appearance: Normal appearance.   HENT:      Head: Normocephalic and atraumatic.   Eyes:      General: No scleral icterus.     Extraocular Movements: Extraocular movements intact.   Cardiovascular:      Rate and Rhythm: Normal rate and regular rhythm.      Pulses: Normal pulses.      Heart sounds: Normal heart sounds.   Pulmonary:      Effort: Pulmonary effort is normal.      Breath sounds: Normal breath sounds. No wheezing.   Abdominal:      General: Bowel sounds are normal. There is no distension.      Palpations: Abdomen is soft.      Tenderness: There is abdominal tenderness.   Musculoskeletal:         General: No swelling or tenderness.   Skin:     General: Skin is warm and dry.      Coloration: Skin is not jaundiced.    Neurological:      General: No focal deficit present.      Mental Status: She is alert and oriented to person, place, and time.       Significant Labs:  CBC:   Recent Labs   Lab 07/17/22  0326   WBC 10.31   HGB 13.7   HCT 41.1        CMP:   Recent Labs   Lab 07/17/22  0326   *   CALCIUM 9.9   ALBUMIN 3.5   PROT 7.3      K 3.6   CO2 26      BUN 5*   CREATININE 0.6   ALKPHOS 405*   *   *   BILITOT 8.4*       Significant Imaging:  CT: I have reviewed all results within the past 24 hours and my personal findings are:  stone in CBD    Assessment/Plan:     Elevated LFTs  2/2 choledocholithiasis   - monitor with cmp daily     Gallstone pancreatitis  2/2 to retained stone.    - aggressive IVFs, recommend starting at 200 cc/hour   - pain control per HM  - npo for ERCP today  - hold plavix     Choledocholithiasis  Plan for ERCP for stone removal and likely stent placement today  - npo  - hold plavix      Thank you for your consult. I will follow-up with patient. Please contact us if you have any additional questions.    Dina Patel MD  Gastroenterology  [facility] - Emergency Dept.

## 2022-07-17 NOTE — ASSESSMENT & PLAN NOTE
2/2 to retained stone.    - aggressive IVFs, recommend starting at 200 cc/hour   - pain control per HM  - npo for ERCP today  - hold plavix

## 2022-07-17 NOTE — Clinical Note
Diagnosis: Acute gallstone pancreatitis [[id number]]   Future Attending Provider: COLLEEN RIOS [[id number]]   Admitting Provider:: COLLEEN RIOS [[id number]]

## 2022-07-17 NOTE — ED PROVIDER NOTES
SCRIBE #1 NOTE: I, Marisabel Corrigan, am scribing for, and in the presence of, Cash Tran Jr., MD. I have scribed the HPI, ROS, and PEx.     SCRIBE #2 NOTE: I, Phu Reese, am scribing for, and in the presence of,  Lanre Bernal MD. I have scribed the remaining portions of the note not scribed by Scribe #1.      History     Chief Complaint   Patient presents with    Abdominal Pain     Review of patient's allergies indicates:   Allergen Reactions    No known allergies          History of Present Illness     HPI    7/17/2022, 2:55 AM  History obtained from the patient      History of Present Illness: Shanda Marrero is a 71 y.o. female patient with a PMHx of atrial flutter, acute coronary syndrome coronary artery disease, hyperlipidemia, and HTN who presents to the Emergency Department for evaluation of generalized abdominal pain which onset 2 days PTA. Symptoms are constant and moderate in severity. Pt states she has been having severe generalized abdominal pain that started on 7/15 at night. Pt states she has had at least one episode of emesis. Associated sxs include dysuria. Pt states that her urine has been dark.  Patient denies any back pain, fever, SOB, CP, sore throat, and all other sxs at this time. Pt states that she had a sinus infection 1 week ago. Prior Tx includes ibuprofen, but it did not improve sxs. No further complaints or concerns at this time.       Arrival mode: [facility]    PCP: Jean Vasques MD        Past Medical History:  Past Medical History:   Diagnosis Date    *Atrial flutter     PAROXYSMAL    Acute coronary syndrome     OLD INF MI    Coronary artery disease     Hyperlipidemia     Hypertension        Past Surgical History:  Past Surgical History:   Procedure Laterality Date    CATARACT EXTRACTION Left 03/03/2022    TORIC    CATARACT EXTRACTION Right 03/24/2022    Toric    CATARACT EXTRACTION W/ INTRAOCULAR LENS IMPLANT Left 03/03/2022    Ora/ Toric    CORONARY ANGIOPLASTY  09/2009     RCA AND LCX- LISA    GALLBLADDER SURGERY  10/2021    LAPAROSCOPIC CHOLECYSTECTOMY N/A 2021    Procedure: CHOLECYSTECTOMY, LAPAROSCOPIC;  Surgeon: Balwinder Castrejon MD;  Location: [facility];  Service: General;  Laterality: N/A;         Family History:  Family History   Problem Relation Age of Onset    Heart attack Father     Heart disease Father        Social History:  Social History     Tobacco Use    Smoking status: Former Smoker     Quit date: 2009     Years since quittin.8    Smokeless tobacco: Never Used   Substance and Sexual Activity    Alcohol use: Yes     Comment: occassionally    Drug use: No    Sexual activity: Not on file        Review of Systems     Review of Systems   Constitutional: Negative for fever.   HENT: Negative for sore throat.    Respiratory: Negative for shortness of breath.    Cardiovascular: Negative for chest pain.   Gastrointestinal: Positive for abdominal pain (generalized). Negative for nausea.   Genitourinary: Positive for dysuria.   Musculoskeletal: Negative for back pain.   Skin: Negative for rash.   Neurological: Negative for weakness.   Hematological: Does not bruise/bleed easily.   All other systems reviewed and are negative.       Physical Exam     Initial Vitals [22 0225]   BP Pulse Resp Temp SpO2   (!) 148/86 81 16 98 °F (36.7 °C) 98 %      MAP       --          Physical Exam  Nursing Notes and Vital Signs Reviewed.  Constitutional: Patient is in mild distress. Well-developed and well-nourished.  Head: Atraumatic. Normocephalic.  Eyes: PERRL. EOM intact. Conjunctivae are not pale. No scleral icterus.  ENT: Mucous membranes are moist. Oropharynx is clear and symmetric.    Neck: Supple. Full ROM. No lymphadenopathy.  Cardiovascular: Regular rate. Regular rhythm. No murmurs, rubs, or gallops. Distal pulses are 2+ and symmetric.  Pulmonary/Chest: No respiratory distress. Clear to auscultation bilaterally. No wheezing or rales.  Abdominal: Generalized  "abdominal tenderness.  No rebound, guarding, or rigidity. Good bowel sounds.  Genitourinary: No CVA tenderness  Musculoskeletal: Moves all extremities. No obvious deformities. No edema. No calf tenderness.  Skin: Warm and dry.  Neurological:  Alert, awake, and appropriate.  Normal speech.  No acute focal neurological deficits are appreciated.  Psychiatric: Normal affect. Good eye contact. Appropriate in content.     ED Course   Procedures  ED Vital Signs:  Vitals:    07/17/22 0225 07/17/22 0305 07/17/22 0335 07/17/22 0435   BP: (!) 148/86 126/70 (!) 152/76 (!) 166/92   Pulse: 81 78 80 83   Resp: 16      Temp: 98 °F (36.7 °C)      TempSrc: Oral      SpO2: 98% 96% 97% (!) 94%   Weight: 68 kg (150 lb)      Height: 5' 2" (1.575 m)       07/17/22 0649 07/17/22 0715 07/17/22 0730 07/17/22 0830   BP:  (!) 153/69 (!) 157/75 (!) 154/70   Pulse:  80 82 81   Resp: 16 17  17   Temp:       TempSrc:       SpO2:  (!) 93% (!) 93% 98%   Weight:       Height:        07/17/22 0930   BP: (!) 165/70   Pulse: 84   Resp: 17   Temp:    TempSrc:    SpO2: 97%   Weight:    Height:        Abnormal Lab Results:  Labs Reviewed   CBC W/ AUTO DIFFERENTIAL - Abnormal; Notable for the following components:       Result Value    Immature Granulocytes 0.6 (*)     Gran # (ANC) 9.3 (*)     Immature Grans (Abs) 0.06 (*)     Lymph # 0.4 (*)     Gran % 89.7 (*)     Lymph % 4.2 (*)     All other components within normal limits   COMPREHENSIVE METABOLIC PANEL - Abnormal; Notable for the following components:    Glucose 132 (*)     BUN 5 (*)     Total Bilirubin 8.4 (*)     Alkaline Phosphatase 405 (*)      (*)      (*)     All other components within normal limits   LIPASE - Abnormal; Notable for the following components:    Lipase >1000 (*)     All other components within normal limits   URINALYSIS, REFLEX TO URINE CULTURE - Abnormal; Notable for the following components:    Protein, UA Trace (*)     Ketones, UA 1+ (*)     Bilirubin (UA) 1+ (*) "     Urobilinogen, UA 4.0-6.0 (*)     All other components within normal limits    Narrative:     Specimen Source->Urine   SARS-COV-2 RDRP GENE    Narrative:     This test utilizes isothermal nucleic acid amplification   technology to detect the SARS-CoV-2 RdRp nucleic acid segment.   The analytical sensitivity (limit of detection) is 125 genome   equivalents/mL.   A POSITIVE result implies infection with the SARS-CoV-2 virus;   the patient is presumed to be contagious.     A NEGATIVE result means that SARS-CoV-2 nucleic acids are not   present above the limit of detection. A NEGATIVE result should be   treated as presumptive. It does not rule out the possibility of   COVID-19 and should not be the sole basis for treatment decisions.   If COVID-19 is strongly suspected based on clinical and exposure   history, re-testing using an alternate molecular assay should be   considered.   This test is only for use under the Food and Drug   Administration s Emergency Use Authorization (EUA).   Commercial kits are provided by [employer].   Performance characteristics of the EUA have been independently   verified by [facility] Department of   Pathology and Laboratory Medicine.   _________________________________________________________________   The authorized Fact Sheet for Healthcare Providers and the authorized Fact   Sheet for Patients of the ID NOW COVID-19 are available on the FDA   website:     https://www.fda.gov/media/223456/download  https://www.fda.gov/media/779514/download                All Lab Results:  Results for orders placed or performed during the hospital encounter of 07/17/22   CBC W/ AUTO DIFFERENTIAL   Result Value Ref Range    WBC 10.31 3.90 - 12.70 K/uL    RBC 4.99 4.00 - 5.40 M/uL    Hemoglobin 13.7 12.0 - 16.0 g/dL    Hematocrit 41.1 37.0 - 48.5 %    MCV 82 82 - 98 fL    MCH 27.5 27.0 - 31.0 pg    MCHC 33.3 32.0 - 36.0 g/dL    RDW 14.4 11.5 - 14.5 %    Platelets 226 150 - 450 K/uL     MPV 10.5 9.2 - 12.9 fL    Immature Granulocytes 0.6 (H) 0.0 - 0.5 %    Gran # (ANC) 9.3 (H) 1.8 - 7.7 K/uL    Immature Grans (Abs) 0.06 (H) 0.00 - 0.04 K/uL    Lymph # 0.4 (L) 1.0 - 4.8 K/uL    Mono # 0.5 0.3 - 1.0 K/uL    Eos # 0.0 0.0 - 0.5 K/uL    Baso # 0.01 0.00 - 0.20 K/uL    nRBC 0 0 /100 WBC    Gran % 89.7 (H) 38.0 - 73.0 %    Lymph % 4.2 (L) 18.0 - 48.0 %    Mono % 5.0 4.0 - 15.0 %    Eosinophil % 0.4 0.0 - 8.0 %    Basophil % 0.1 0.0 - 1.9 %    Differential Method Automated    Comp. Metabolic Panel   Result Value Ref Range    Sodium 137 136 - 145 mmol/L    Potassium 3.6 3.5 - 5.1 mmol/L    Chloride 100 95 - 110 mmol/L    CO2 26 23 - 29 mmol/L    Glucose 132 (H) 70 - 110 mg/dL    BUN 5 (L) 8 - 23 mg/dL    Creatinine 0.6 0.5 - 1.4 mg/dL    Calcium 9.9 8.7 - 10.5 mg/dL    Total Protein 7.3 6.0 - 8.4 g/dL    Albumin 3.5 3.5 - 5.2 g/dL    Total Bilirubin 8.4 (H) 0.1 - 1.0 mg/dL    Alkaline Phosphatase 405 (H) 55 - 135 U/L     (H) 10 - 40 U/L     (H) 10 - 44 U/L    Anion Gap 11 8 - 16 mmol/L    eGFR if African American >60 >60 mL/min/1.73 m^2    eGFR if non African American >60 >60 mL/min/1.73 m^2   Lipase   Result Value Ref Range    Lipase >1000 (H) 4 - 60 U/L   Urinalysis, Reflex to Urine Culture Urine, Clean Catch    Specimen: Urine   Result Value Ref Range    Specimen UA Urine, Clean Catch     Color, UA Yellow Yellow, Straw, Jaki    Appearance, UA Clear Clear    pH, UA 7.0 5.0 - 8.0    Specific Gravity, UA 1.010 1.005 - 1.030    Protein, UA Trace (A) Negative    Glucose, UA Negative Negative    Ketones, UA 1+ (A) Negative    Bilirubin (UA) 1+ (A) Negative    Occult Blood UA Negative Negative    Nitrite, UA Negative Negative    Urobilinogen, UA 4.0-6.0 (A) <2.0 EU/dL    Leukocytes, UA Negative Negative   POCT COVID-19 Rapid Screening   Result Value Ref Range    POC Rapid COVID Negative Negative     Acceptable Yes        Imaging Results:  Imaging Results          CT Abdomen  Pelvis  Without Contrast (Final result)  Result time 07/17/22 09:27:48    Final result by Nawaf Ferrer MD (07/17/22 09:27:48)                 Impression:      1.  Peripancreatic inflammatory changes most consistent with pancreatitis.  Marked dilation of the distal common bile duct measuring up to 2.3 cm is suggested with a 9 mm radiodensity concerning for choledocholithiasis.  Intrahepatic and extrahepatic ductal dilation noted as well.  The patient would benefit from an MRCP.    2. Multiple nodules in a bronchovascular distribution in the middle lobe measuring up to 8 mm.  Findings are most likely infectious or inflammatory nature.  Clinical correlation is advised.  Follow-up CT scan in 8-12 weeks recommended to re-evaluate after treatment of presumed infectious or inflammatory process.    3.  Negative for acute process otherwise.  No other inflammatory changes are seen.  Normal appendix.  Negative for renal stone disease or hydronephrosis.  Negative for free air.    4.  Large hiatal hernia.  Coronary artery calcifications.  Old granulomatous disease.  Cholecystectomy clips.  2.1 cm left renal cyst.  Bilateral extrarenal pelves.  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  Degenerative changes of the spine and pelvis.  Scattered colonic diverticula.  Other nonemergent findings as described above.    5.  I agree with the preliminary interpretation rendered.    All CT scans at this facility are performed  using dose modulation techniques as appropriate to performed exam including the following:  automated exposure control; adjustment of mA and/or kV according to the patients size (this includes techniques or standardized protocols for targeted exams where dose is matched to indication/reason for exam: i.e. extremities or head);  iterative reconstruction technique.      Electronically signed by: Nawaf Ferrer MD  Date:    07/17/2022  Time:    09:27             Narrative:    EXAMINATION:  CT ABDOMEN PELVIS WITHOUT  CONTRAST, multiplanar reconstructions    CLINICAL HISTORY:  Abdominal abscess/infection suspected;    TECHNIQUE:  Axial images through the abdomen and pelvis were obtained without the use of IV contrast.  Sagittal and coronal reconstructions are provided for review.  Oral contrast was not utilized.    COMPARISON:  Abdominal ultrasound from August 12, 2021    FINDINGS:  LUNG BASES: There are numerous nodules in a bronchovascular distribution within the middle lobe, measuring up to 8 mm in size.  Dependent atelectasis in the lung bases.  Left lower lobe calcified granuloma.  Lung bases are otherwise clear.  Negative for pleural or pericardial effusions. The distal esophagus is normal.  Large hiatal hernia.  Coronary artery calcifications.  Calcified left hilar lymph nodes.    ABDOMEN: There are inflammatory changes along the pancreas.  Status post cholecystectomy.  Mild intrahepatic and extrahepatic ductal dilation with the common bile duct measuring 1.1 cm.  The distal common bile duct appears to be markedly dilated to 2.3 cm with a 9 mm radiodensity in the dilated distal common bile duct.  Calcified granuloma within the spleen.  2.1 cm medial left renal cyst.  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  Bilateral extrarenal pelves.  The liver and spleen otherwise appear normal.  The pancreas is otherwise normal.  Kidneys and adrenal glands are otherwise normal.    Negative for adenopathy, or ascites noted within the abdomen or pelvis.  Vascular calcifications are present without aneurysmal changes.    The bowel appears normal. Normal appendix.  Negative for free air.  Scattered colonic diverticula.    PELVIS: The urinary bladder is unremarkable.  The uterus is tilted to the left.  The female pelvic organs are otherwise normal.  There are pelvic phleboliths.    Tiny fat filled umbilical hernia.  The abdominal wall is otherwise intact.    No significant osseous abnormality is identified.  Negative for significant spinal  canal stenosis. Osteopenia.  Multilevel marginal spondylosis and degenerative facet arthropathy.  Multilevel Schmorl node formation.  Convex-left curvature of the lower lumbar spine.  Mild degenerative changes of the pelvis.    Negative for groin adenopathy.                                      The Emergency Provider reviewed the vital signs and test results, which are outlined above.     ED Discussion     6:00 AM: Dr. Tran transfers care of patient to Dr. Bernal pending lab and imaging results.    10:01 AM: Discussed pt's case with Dr. Dina Ch (Gastroenterology) who recommends I will be down to see her shortly. Spoke to Adarsh and [facility] Keep [facility]po, admit to  and pain control.    10:39 AM: Discussed case with Leatha Causey NP (Hospital Medicine). Dr. Del Rosario agrees with current care and management of pt and accepts admission.   Admitting Service: Hospital Medicine  Admitting Physician: Dr. Del Rosario  Admit to: Obs      ED Course as of 07/17/22 1049   Sun Jul 17, 2022   0953 Patient notified of renal cyst and pulm nodules and instructed to f/u with PCP [DP]      ED Course User Index  [DP] Lanre Bernal MD     Medical Decision Making:   Clinical Tests:   Lab Tests: Reviewed and Ordered  Radiological Study: Ordered and Reviewed           ED Medication(s):  Medications   sodium chloride 0.9% bolus 1,000 mL (0 mLs Intravenous Stopped 7/17/22 0430)   ondansetron injection 4 mg (4 mg Intravenous Given 7/17/22 0327)   ketorolac injection 15 mg (15 mg Intravenous Given 7/17/22 0326)   morphine injection 4 mg (4 mg Intravenous Given 7/17/22 0649)   sodium chloride 0.9% bolus 1,000 mL (0 mLs Intravenous Stopped 7/17/22 0750)       New Prescriptions    No medications on file               Scribe Attestation:   Scribe #1: I performed the above scribed service and the documentation accurately describes the services I performed. I attest to the accuracy of the note.      Attending:   Physician Attestation Statement for Scribe #1: I, Cash Tran Jr., MD, personally performed the services described in this documentation, as scribed by Marisabel Corrigan, in my presence, and it is both accurate and complete.       Scribe Attestation:   Scribe #2: I performed the above scribed service and the documentation accurately describes the services I performed. I attest to the accuracy of the note.    Attending Attestation:           Physician Attestation for Scribe:    Physician Attestation Statement for Scribe #2: I, Lanre Bernal MD, reviewed documentation, as scribed by Phu Reese in my presence, and it is both accurate and complete. I also acknowledge and confirm the content of the note done by Scribe #1.           Clinical Impression       ICD-10-CM ICD-9-CM   1. Acute gallstone pancreatitis  K85.10 577.0     574.20   2. Choledocholithiasis  K80.50 574.50   3. Renal cyst  N28.1 753.10   4. Pulmonary nodule  R91.1 793.11       Disposition:   Disposition: Admitted  Condition: Fair       Lanre Bernal MD  07/17/22 1050

## 2022-07-17 NOTE — ED NOTES
Care handoff from OMAR Steen. Pt is lying in bed without distress, reports improvement to abdominal pain to 0/10 after morphine administration. O2 saturation noted to be 89% with good wave form after medication given, pt placed on O2 via NC at 2 LPM with improvement. Pt denies sob. Call bell in reach. No needs or complaints noted.     Patient has verified their name and  on armband.     APPEARANCE: Patient is alert, calm, oriented x 4, and does not appear distressed.  SKIN: Skin is normal for race, warm, and dry. Normal skin turgor.  CARDIAC: Normal rate and rhythm.   RESPIRATORY:Normal rate and effort. Respirations are equal and unlabored.    GASTRO: Bowel sounds normal, abdomen is soft, no tenderness, and no abdominal distention. Denies n/v/d  PERIPHERAL VASCULAR: peripheral pulses present x 4. No edema. Extremities x 4 warm to touch without discoloration.  NEURO: No neurological abnormalities.   MENTAL STATUS: awake, alert and aware of environment.   : Voids without complication.

## 2022-07-17 NOTE — HPI
Ms Marrero is a 71 year old female with PMHx of CAD, HTN, cholecystectomy,  paroxymal A Flutter and HLD who presented to [facility] for evaluation of abdominal pain that started about 2 days ago. Associated symptoms include one episode of nausea and vomiting. Denies associated symptoms of fever, chills, chest pain, shortness of breath or diaphoresis. CT of the abdomin/pelvis showed peripancreatic inflammatory changes most consistent with pancreatitis, marked dilation of the distal common bile duct measuring up to 2.3 cm is suggested with a 9 mm radiodensity concerning for choledocholithiasis, intrahepatic and extrahepatic ductal dilation noted as well.  Lipase > 1000, total bilirubin 8.2 with elevated LFT as well. GI consulted, planed ERCP for today. Patient is a full code, son is surrogate decision maker. She is being admitted under the care of Hospital Medicine.

## 2022-07-17 NOTE — H&P
[facility] Medicine  History & Physical    Patient Name: Shanda Marrero  MRN: [id number]  Patient Class: IP- Inpatient  Admission Date: 7/17/2022  Attending Physician: Mercedes Del Rosario MD   Primary Care Provider: Jean Vasques MD         Patient information was obtained from patient and ER records.     Subjective:     Principal Problem:Gallstone pancreatitis    Chief Complaint:   Chief Complaint   Patient presents with    Abdominal Pain        HPI: Ms Marrero is a 71 year old female with PMHx of CAD, HTN, cholecystectomy,  paroxymal A Flutter and HLD who presented to [facility] for evaluation of abdominal pain that started about 2 days ago. Associated symptoms include one episode of nausea and vomiting. Denies associated symptoms of fever, chills, chest pain, shortness of breath or diaphoresis. CT of the abdomin/pelvis showed peripancreatic inflammatory changes most consistent with pancreatitis, marked dilation of the distal common bile duct measuring up to 2.3 cm is suggested with a 9 mm radiodensity concerning for choledocholithiasis, intrahepatic and extrahepatic ductal dilation noted as well.  Lipase > 1000, total bilirubin 8.2 with elevated LFT as well. GI consulted, planed ERCP for today. Patient is a full code, son is surrogate decision maker. She is being admitted under the care of Hospital Medicine.       Past Medical History:   Diagnosis Date    *Atrial flutter     PAROXYSMAL    Acute coronary syndrome     OLD INF MI    Coronary artery disease     Hyperlipidemia     Hypertension        Past Surgical History:   Procedure Laterality Date    CATARACT EXTRACTION Left 03/03/2022    TORIC    CATARACT EXTRACTION Right 03/24/2022    Toric    CATARACT EXTRACTION W/ INTRAOCULAR LENS IMPLANT Left 03/03/2022    Ora/ Toric    CORONARY ANGIOPLASTY  09/2009    RCA AND LCX- LISA    GALLBLADDER SURGERY  10/2021    LAPAROSCOPIC CHOLECYSTECTOMY N/A 09/24/2021    Procedure: CHOLECYSTECTOMY, LAPAROSCOPIC;   Surgeon: Balwinder Castrejon MD;  Location: [facility];  Service: General;  Laterality: N/A;       Review of patient's allergies indicates:   Allergen Reactions    No known allergies        No current facility-administered medications on file prior to encounter.     Current Outpatient Medications on File Prior to Encounter   Medication Sig    CARAFATE 1 gram tablet Take 1 g by mouth 4 (four) times daily.    clopidogreL (PLAVIX) 75 mg tablet TAKE 1 TABLET EVERY DAY    metoprolol succinate (TOPROL-XL) 25 MG 24 hr tablet TAKE 1 TABLET EVERY DAY    omeprazole (PRILOSEC) 40 MG capsule TAKE 1 CAPSULE EVERY DAY    pantoprazole (PROTONIX) 40 MG tablet Take 40 mg by mouth once daily.    simvastatin (ZOCOR) 40 MG tablet TAKE 1 TABLET EVERY EVENING.    [DISCONTINUED] amoxicillin-clavulanate 875-125mg (AUGMENTIN) 875-125 mg per tablet Take 1 tablet by mouth 2 (two) times daily.    [DISCONTINUED] levocetirizine (XYZAL) 5 MG tablet Take 5 mg by mouth nightly.    [DISCONTINUED] traZODone (DESYREL) 100 MG tablet      Family History       Problem Relation (Age of Onset)    Heart attack Father    Heart disease Father          Tobacco Use    Smoking status: Former Smoker     Quit date: 2009     Years since quittin.8    Smokeless tobacco: Never Used   Substance and Sexual Activity    Alcohol use: Yes     Comment: occassionally    Drug use: No    Sexual activity: Not on file     Review of Systems   Constitutional:  Positive for fatigue. Negative for chills and fever.   HENT:  Negative for congestion, rhinorrhea and sinus pressure.    Respiratory:  Negative for apnea, cough, choking, chest tightness, shortness of breath, wheezing and stridor.    Cardiovascular:  Negative for chest pain, palpitations and leg swelling.   Gastrointestinal:  Positive for abdominal pain, nausea (one episiode) and vomiting (one episiode). Negative for abdominal distention and diarrhea.   Endocrine: Negative for cold intolerance and heat  intolerance.   Genitourinary:  Negative for difficulty urinating and hematuria.   Musculoskeletal:  Negative for arthralgias and joint swelling.   Skin:  Negative for color change, pallor and rash.   Neurological:  Negative for dizziness, seizures, weakness, numbness and headaches.   Psychiatric/Behavioral:  Negative for agitation. The patient is not nervous/anxious.    All other systems reviewed and are negative.  Objective:     Vital Signs (Most Recent):  Temp: 98.5 °F (36.9 °C) (07/17/22 1305)  Pulse: 84 (07/17/22 1305)  Resp: 18 (07/17/22 1305)  BP: (!) 147/65 (07/17/22 1305)  SpO2: (!) 92 % (07/17/22 1305)   Vital Signs (24h Range):  Temp:  [98 °F (36.7 °C)-98.5 °F (36.9 °C)] 98.5 °F (36.9 °C)  Pulse:  [78-84] 84  Resp:  [16-21] 18  SpO2:  [92 %-98 %] 92 %  BP: (126-188)/(65-92) 147/65     Weight: 68 kg (150 lb)  Body mass index is 27.44 kg/m².    Physical Exam  Vitals and nursing note reviewed.   HENT:      Head: Normocephalic and atraumatic.   Eyes:      General:         Right eye: No discharge.         Left eye: No discharge.   Cardiovascular:      Heart sounds: No murmur heard.    No friction rub. No gallop.   Pulmonary:      Effort: No respiratory distress.      Breath sounds: No stridor. No wheezing, rhonchi or rales.   Chest:      Chest wall: No tenderness.   Abdominal:      General: There is no distension.      Palpations: There is no mass.      Tenderness: There is abdominal tenderness. There is no right CVA tenderness, left CVA tenderness, guarding or rebound.      Hernia: No hernia is present.   Musculoskeletal:         General: No swelling, tenderness, deformity or signs of injury.      Right lower leg: No edema.   Skin:     Coloration: Skin is not jaundiced or pale.      Findings: No bruising, erythema, lesion or rash.   Neurological:      Cranial Nerves: No cranial nerve deficit.      Sensory: No sensory deficit.      Motor: Weakness present.      Coordination: Coordination normal.      Gait: Gait  normal.      Deep Tendon Reflexes: Reflexes normal.           Significant Labs: All pertinent labs within the past 24 hours have been reviewed.  CBC:   Recent Labs   Lab 07/17/22 0326   WBC 10.31   HGB 13.7   HCT 41.1        CMP:   Recent Labs   Lab 07/17/22 0326      K 3.6      CO2 26   *   BUN 5*   CREATININE 0.6   CALCIUM 9.9   PROT 7.3   ALBUMIN 3.5   BILITOT 8.4*   ALKPHOS 405*   *   *   ANIONGAP 11   EGFRNONAA >60     Lipase:   Recent Labs   Lab 07/17/22 0326   LIPASE >1000*       Significant Imaging:     Imaging Results              CT Abdomen Pelvis  Without Contrast (Final result)  Result time 07/17/22 09:27:48      Final result by Nawaf Ferrer MD (07/17/22 09:27:48)                   Impression:      1.  Peripancreatic inflammatory changes most consistent with pancreatitis.  Marked dilation of the distal common bile duct measuring up to 2.3 cm is suggested with a 9 mm radiodensity concerning for choledocholithiasis.  Intrahepatic and extrahepatic ductal dilation noted as well.  The patient would benefit from an MRCP.    2. Multiple nodules in a bronchovascular distribution in the middle lobe measuring up to 8 mm.  Findings are most likely infectious or inflammatory nature.  Clinical correlation is advised.  Follow-up CT scan in 8-12 weeks recommended to re-evaluate after treatment of presumed infectious or inflammatory process.    3.  Negative for acute process otherwise.  No other inflammatory changes are seen.  Normal appendix.  Negative for renal stone disease or hydronephrosis.  Negative for free air.    4.  Large hiatal hernia.  Coronary artery calcifications.  Old granulomatous disease.  Cholecystectomy clips.  2.1 cm left renal cyst.  Bilateral extrarenal pelves.  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  Degenerative changes of the spine and pelvis.  Scattered colonic diverticula.  Other nonemergent findings as described above.    5.  I agree with the  preliminary interpretation rendered.    All CT scans at this facility are performed  using dose modulation techniques as appropriate to performed exam including the following:  automated exposure control; adjustment of mA and/or kV according to the patients size (this includes techniques or standardized protocols for targeted exams where dose is matched to indication/reason for exam: i.e. extremities or head);  iterative reconstruction technique.      Electronically signed by: Nawaf Ferrer MD  Date:    07/17/2022  Time:    09:27               Narrative:    EXAMINATION:  CT ABDOMEN PELVIS WITHOUT CONTRAST, multiplanar reconstructions    CLINICAL HISTORY:  Abdominal abscess/infection suspected;    TECHNIQUE:  Axial images through the abdomen and pelvis were obtained without the use of IV contrast.  Sagittal and coronal reconstructions are provided for review.  Oral contrast was not utilized.    COMPARISON:  Abdominal ultrasound from August 12, 2021    FINDINGS:  LUNG BASES: There are numerous nodules in a bronchovascular distribution within the middle lobe, measuring up to 8 mm in size.  Dependent atelectasis in the lung bases.  Left lower lobe calcified granuloma.  Lung bases are otherwise clear.  Negative for pleural or pericardial effusions. The distal esophagus is normal.  Large hiatal hernia.  Coronary artery calcifications.  Calcified left hilar lymph nodes.    ABDOMEN: There are inflammatory changes along the pancreas.  Status post cholecystectomy.  Mild intrahepatic and extrahepatic ductal dilation with the common bile duct measuring 1.1 cm.  The distal common bile duct appears to be markedly dilated to 2.3 cm with a 9 mm radiodensity in the dilated distal common bile duct.  Calcified granuloma within the spleen.  2.1 cm medial left renal cyst.  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  Bilateral extrarenal pelves.  The liver and spleen otherwise appear normal.  The pancreas is otherwise normal.  Kidneys and  adrenal glands are otherwise normal.    Negative for adenopathy, or ascites noted within the abdomen or pelvis.  Vascular calcifications are present without aneurysmal changes.    The bowel appears normal. Normal appendix.  Negative for free air.  Scattered colonic diverticula.    PELVIS: The urinary bladder is unremarkable.  The uterus is tilted to the left.  The female pelvic organs are otherwise normal.  There are pelvic phleboliths.    Tiny fat filled umbilical hernia.  The abdominal wall is otherwise intact.    No significant osseous abnormality is identified.  Negative for significant spinal canal stenosis. Osteopenia.  Multilevel marginal spondylosis and degenerative facet arthropathy.  Multilevel Schmorl node formation.  Convex-left curvature of the lower lumbar spine.  Mild degenerative changes of the pelvis.    Negative for groin adenopathy.                                       Assessment/Plan:     * Gallstone pancreatitis  Admit to Hospital Medicine   Lipase > 1000  Consult GI   Aggressive IVF's   Pain control   NPO   Plan ERCP today by Dr Altamirano   Hold Plavix       Choledocholithiasis    GI consulted   Plan ERCP today   NPO   Hold Plavix     Elevated LFTs  Probable related to choledocholithiases   Monitor liver function       Coronary artery disease, occlusive  Continue Statin, B blocker and Plavix when tolerating PO       Dyslipidemia  Continue Statin when tolerating PO       Atrial flutter, paroxysmal  Continue B blocker and Plavix when tolerating PO   Patient currently not on oral anticoagulation at home         VTE Risk Mitigation (From admission, onward)         Ordered     Place sequential compression device  Until discontinued         07/17/22 3247                   Dru Jaramillo NP  Department of Hospital Medicine   [facility] Med [facility]

## 2022-07-17 NOTE — PLAN OF CARE
Problem: Adult Inpatient Plan of Care  Goal: Plan of Care Review  Outcome: Ongoing, Progressing  Goal: Patient-Specific Goal (Individualized)  Outcome: Ongoing, Progressing  Goal: Absence of Hospital-Acquired Illness or Injury  Outcome: Ongoing, Progressing  Goal: Optimal Comfort and Wellbeing  Outcome: Ongoing, Progressing  Goal: Readiness for Transition of Care  Outcome: Ongoing, Progressing

## 2022-07-17 NOTE — ASSESSMENT & PLAN NOTE
Admit to Hospital Medicine   Lipase > 1000  Consult GI   Aggressive IVF's   Pain control   NPO   Plan ERCP today by Dr Altamirano   Hold Plavix

## 2022-07-17 NOTE — PHARMACY MED REC
"Admission Medication History     The home medication history was taken by Arjun Lu.    You may go to "Admission" then "Reconcile Home Medications" tabs to review and/or act upon these items.      The home medication list has been updated by the Pharmacy department.    Please read ALL comments highlighted in yellow.    Please address this information as you see fit.     Feel free to contact us if you have any questions or require assistance.      The medications listed below were removed from the home medication list. Please reorder if appropriate:  Patient reports no longer taking the following medication(s):   AMOX-CLAV 875-125 MG PER TABLET (therapy completed)   LEVOCETIRIZINE 5 MG TABLET   TRAZODONE 100 MG TABLET    Medications listed below were obtained from: Patient/family, Analytic software- [employer] and Patient's pharmacy  (Not in a hospital admission)      Potential issues to be addressed PRIOR TO DISCHARGE  Please discuss with the patient barriers to adherence with medication treatment plans      Arjun Lu CPhT  Pharmacy Technician Specialist-Medication History  [facility] [phone]  Secure chat preferred     Current Outpatient Medications on File Prior to Encounter   Medication Sig Dispense Refill Last Dose    CARAFATE 1 gram tablet Take 1 g by mouth 4 (four) times daily.   7/16/2022 at Unknown time    clopidogreL (PLAVIX) 75 mg tablet TAKE 1 TABLET EVERY DAY 90 tablet 3 Past Week at Friday, 07/15/22    metoprolol succinate (TOPROL-XL) 25 MG 24 hr tablet TAKE 1 TABLET EVERY DAY 90 tablet 3 Past Week at Friday, 07/15/22    omeprazole (PRILOSEC) 40 MG capsule TAKE 1 CAPSULE EVERY DAY 90 capsule 3 Past Week at Friday, 07/15/22    pantoprazole (PROTONIX) 40 MG tablet Take 40 mg by mouth once daily.   7/16/2022 at Friday, 07/15/22    simvastatin (ZOCOR) 40 MG tablet TAKE 1 TABLET EVERY EVENING. 90 tablet 3 Past Week at Friday, 07/15/22    [DISCONTINUED] amoxicillin-clavulanate " 875-125mg (AUGMENTIN) 875-125 mg per tablet Take 1 tablet by mouth 2 (two) times daily.       [DISCONTINUED] levocetirizine (XYZAL) 5 MG tablet Take 5 mg by mouth nightly.  3     [DISCONTINUED] traZODone (DESYREL) 100 MG tablet                                     .

## 2022-07-17 NOTE — HPI
PMHx of atrial flutter, acute coronary syndrome coronary artery disease s/p stents (on plavix), hyperlipidemia, and HTN presents with abdominal pain.  Pain started about a week ago but was intermittent and mild.  She then developed severe pain Friday night that worsened.  She awoke from sleep with pain and vomiting and therefore called EMS.  In ER, CT scan done showed stone in CBD and labs show elevated lipase, AST/ALT and bilirubin.  She has history of cholecystectomy one year ago.  Pt was given morphine in ER and says that helped her pain some.  Currently denies nausea, vomiting or diarrhea.

## 2022-07-18 ENCOUNTER — ANESTHESIA EVENT (OUTPATIENT)
Dept: ENDOSCOPY | Facility: HOSPITAL | Age: 72
DRG: 439 | End: 2022-07-18
Payer: MEDICARE

## 2022-07-18 ENCOUNTER — ANESTHESIA (OUTPATIENT)
Dept: ENDOSCOPY | Facility: HOSPITAL | Age: 72
DRG: 439 | End: 2022-07-18
Payer: MEDICARE

## 2022-07-18 LAB
ALBUMIN SERPL BCP-MCNC: 2.8 G/DL (ref 3.5–5.2)
ALP SERPL-CCNC: 398 U/L (ref 55–135)
ALT SERPL W/O P-5'-P-CCNC: 336 U/L (ref 10–44)
ANION GAP SERPL CALC-SCNC: 13 MMOL/L (ref 8–16)
AST SERPL-CCNC: 311 U/L (ref 10–40)
BASOPHILS # BLD AUTO: 0.03 K/UL (ref 0–0.2)
BASOPHILS NFR BLD: 0.3 % (ref 0–1.9)
BILIRUB SERPL-MCNC: 8.5 MG/DL (ref 0.1–1)
BUN SERPL-MCNC: 7 MG/DL (ref 8–23)
CALCIUM SERPL-MCNC: 8.4 MG/DL (ref 8.7–10.5)
CHLORIDE SERPL-SCNC: 105 MMOL/L (ref 95–110)
CO2 SERPL-SCNC: 21 MMOL/L (ref 23–29)
CREAT SERPL-MCNC: 0.6 MG/DL (ref 0.5–1.4)
DIFFERENTIAL METHOD: ABNORMAL
EOSINOPHIL # BLD AUTO: 0.1 K/UL (ref 0–0.5)
EOSINOPHIL NFR BLD: 0.7 % (ref 0–8)
ERYTHROCYTE [DISTWIDTH] IN BLOOD BY AUTOMATED COUNT: 14.6 % (ref 11.5–14.5)
EST. GFR  (AFRICAN AMERICAN): >60 ML/MIN/1.73 M^2
EST. GFR  (NON AFRICAN AMERICAN): >60 ML/MIN/1.73 M^2
GLUCOSE SERPL-MCNC: 75 MG/DL (ref 70–110)
HCT VFR BLD AUTO: 38.4 % (ref 37–48.5)
HGB BLD-MCNC: 12.2 G/DL (ref 12–16)
IMM GRANULOCYTES # BLD AUTO: 0.05 K/UL (ref 0–0.04)
IMM GRANULOCYTES NFR BLD AUTO: 0.5 % (ref 0–0.5)
LIPASE SERPL-CCNC: >1000 U/L (ref 4–60)
LYMPHOCYTES # BLD AUTO: 0.7 K/UL (ref 1–4.8)
LYMPHOCYTES NFR BLD: 6.7 % (ref 18–48)
MCH RBC QN AUTO: 27.6 PG (ref 27–31)
MCHC RBC AUTO-ENTMCNC: 31.8 G/DL (ref 32–36)
MCV RBC AUTO: 87 FL (ref 82–98)
MONOCYTES # BLD AUTO: 0.8 K/UL (ref 0.3–1)
MONOCYTES NFR BLD: 8 % (ref 4–15)
NEUTROPHILS # BLD AUTO: 8.2 K/UL (ref 1.8–7.7)
NEUTROPHILS NFR BLD: 83.8 % (ref 38–73)
NRBC BLD-RTO: 0 /100 WBC
PLATELET # BLD AUTO: 214 K/UL (ref 150–450)
PMV BLD AUTO: 10.7 FL (ref 9.2–12.9)
POTASSIUM SERPL-SCNC: 3.6 MMOL/L (ref 3.5–5.1)
PROT SERPL-MCNC: 5.2 G/DL (ref 6–8.4)
RBC # BLD AUTO: 4.42 M/UL (ref 4–5.4)
SODIUM SERPL-SCNC: 139 MMOL/L (ref 136–145)
WBC # BLD AUTO: 9.83 K/UL (ref 3.9–12.7)

## 2022-07-18 PROCEDURE — 85025 COMPLETE CBC W/AUTO DIFF WBC: CPT | Performed by: NURSE PRACTITIONER

## 2022-07-18 PROCEDURE — 43247 EGD REMOVE FOREIGN BODY: CPT | Performed by: INTERNAL MEDICINE

## 2022-07-18 PROCEDURE — 43274 PR ERCP W/STENT PLCMNT BILIARY/PANCREATIC DUCT: ICD-10-PCS | Mod: ,,, | Performed by: INTERNAL MEDICINE

## 2022-07-18 PROCEDURE — 63600175 PHARM REV CODE 636 W HCPCS: Performed by: NURSE PRACTITIONER

## 2022-07-18 PROCEDURE — 63600175 PHARM REV CODE 636 W HCPCS: Performed by: NURSE ANESTHETIST, CERTIFIED REGISTERED

## 2022-07-18 PROCEDURE — 74328 PR  X-RAY FOR BILE DUCT ENDOSCOPY: ICD-10-PCS | Mod: 26,,, | Performed by: INTERNAL MEDICINE

## 2022-07-18 PROCEDURE — C2617 STENT, NON-COR, TEM W/O DEL: HCPCS | Performed by: INTERNAL MEDICINE

## 2022-07-18 PROCEDURE — 27201674 HC SPHINCTERTOME: Performed by: INTERNAL MEDICINE

## 2022-07-18 PROCEDURE — 43274 ERCP DUCT STENT PLACEMENT: CPT | Performed by: INTERNAL MEDICINE

## 2022-07-18 PROCEDURE — 43264 ERCP REMOVE DUCT CALCULI: CPT | Performed by: INTERNAL MEDICINE

## 2022-07-18 PROCEDURE — 43264 PR ERCP,W/REMOVAL STONE,BIL/PANCR DUCTS: ICD-10-PCS | Mod: 51,,, | Performed by: INTERNAL MEDICINE

## 2022-07-18 PROCEDURE — 74328 X-RAY BILE DUCT ENDOSCOPY: CPT | Performed by: INTERNAL MEDICINE

## 2022-07-18 PROCEDURE — 43264 ERCP REMOVE DUCT CALCULI: CPT | Mod: 51,,, | Performed by: INTERNAL MEDICINE

## 2022-07-18 PROCEDURE — 83690 ASSAY OF LIPASE: CPT | Performed by: NURSE PRACTITIONER

## 2022-07-18 PROCEDURE — 25500020 PHARM REV CODE 255: Performed by: INTERNAL MEDICINE

## 2022-07-18 PROCEDURE — C1769 GUIDE WIRE: HCPCS | Performed by: INTERNAL MEDICINE

## 2022-07-18 PROCEDURE — 27202127 HC STENT INTRODUCER: Performed by: INTERNAL MEDICINE

## 2022-07-18 PROCEDURE — 43247 PR EGD, FLEX, W/REMOVAL, FOREIGN BODY: ICD-10-PCS | Mod: 51,,, | Performed by: INTERNAL MEDICINE

## 2022-07-18 PROCEDURE — 80053 COMPREHEN METABOLIC PANEL: CPT | Performed by: NURSE PRACTITIONER

## 2022-07-18 PROCEDURE — 43247 EGD REMOVE FOREIGN BODY: CPT | Mod: 51,,, | Performed by: INTERNAL MEDICINE

## 2022-07-18 PROCEDURE — 25000003 PHARM REV CODE 250: Performed by: INTERNAL MEDICINE

## 2022-07-18 PROCEDURE — 43274 ERCP DUCT STENT PLACEMENT: CPT | Mod: ,,, | Performed by: INTERNAL MEDICINE

## 2022-07-18 PROCEDURE — 11000001 HC ACUTE MED/SURG PRIVATE ROOM

## 2022-07-18 PROCEDURE — 27201042 HC RETRIEVAL NET: Performed by: INTERNAL MEDICINE

## 2022-07-18 PROCEDURE — 27202074 HC NEEDLE KNIFE, BILIARY: Performed by: INTERNAL MEDICINE

## 2022-07-18 PROCEDURE — 37000009 HC ANESTHESIA EA ADD 15 MINS: Performed by: INTERNAL MEDICINE

## 2022-07-18 PROCEDURE — 25000003 PHARM REV CODE 250: Performed by: NURSE ANESTHETIST, CERTIFIED REGISTERED

## 2022-07-18 PROCEDURE — 36415 COLL VENOUS BLD VENIPUNCTURE: CPT | Performed by: NURSE PRACTITIONER

## 2022-07-18 PROCEDURE — 74328 X-RAY BILE DUCT ENDOSCOPY: CPT | Mod: 26,,, | Performed by: INTERNAL MEDICINE

## 2022-07-18 PROCEDURE — 27202125 HC BALLOON, EXTRACTION (ANY): Performed by: INTERNAL MEDICINE

## 2022-07-18 PROCEDURE — 37000008 HC ANESTHESIA 1ST 15 MINUTES: Performed by: INTERNAL MEDICINE

## 2022-07-18 RX ORDER — LIDOCAINE HYDROCHLORIDE 20 MG/ML
INJECTION, SOLUTION EPIDURAL; INFILTRATION; INTRACAUDAL; PERINEURAL
Status: DISCONTINUED | OUTPATIENT
Start: 2022-07-18 | End: 2022-07-18

## 2022-07-18 RX ORDER — SUCCINYLCHOLINE CHLORIDE 20 MG/ML
INJECTION INTRAMUSCULAR; INTRAVENOUS
Status: DISCONTINUED | OUTPATIENT
Start: 2022-07-18 | End: 2022-07-18

## 2022-07-18 RX ORDER — EPHEDRINE SULFATE 50 MG/ML
INJECTION, SOLUTION INTRAVENOUS
Status: DISCONTINUED | OUTPATIENT
Start: 2022-07-18 | End: 2022-07-18

## 2022-07-18 RX ORDER — FENTANYL CITRATE 50 UG/ML
INJECTION, SOLUTION INTRAMUSCULAR; INTRAVENOUS
Status: DISCONTINUED | OUTPATIENT
Start: 2022-07-18 | End: 2022-07-18

## 2022-07-18 RX ORDER — INDOMETHACIN 50 MG/1
SUPPOSITORY RECTAL
Status: DISCONTINUED | OUTPATIENT
Start: 2022-07-18 | End: 2022-07-19 | Stop reason: HOSPADM

## 2022-07-18 RX ORDER — INDOMETHACIN 50 MG/1
100 SUPPOSITORY RECTAL EVERY 12 HOURS PRN
Status: DISCONTINUED | OUTPATIENT
Start: 2022-07-18 | End: 2022-07-19 | Stop reason: HOSPADM

## 2022-07-18 RX ORDER — ROCURONIUM BROMIDE 10 MG/ML
INJECTION, SOLUTION INTRAVENOUS
Status: DISCONTINUED | OUTPATIENT
Start: 2022-07-18 | End: 2022-07-18

## 2022-07-18 RX ORDER — PROPOFOL 10 MG/ML
VIAL (ML) INTRAVENOUS
Status: DISCONTINUED | OUTPATIENT
Start: 2022-07-18 | End: 2022-07-18

## 2022-07-18 RX ORDER — ONDANSETRON 2 MG/ML
INJECTION INTRAMUSCULAR; INTRAVENOUS
Status: DISCONTINUED | OUTPATIENT
Start: 2022-07-18 | End: 2022-07-18

## 2022-07-18 RX ADMIN — IOHEXOL 10 ML: 240 INJECTION, SOLUTION INTRATHECAL; INTRAVASCULAR; INTRAVENOUS; ORAL at 10:07

## 2022-07-18 RX ADMIN — SODIUM CHLORIDE, POTASSIUM CHLORIDE, SODIUM LACTATE AND CALCIUM CHLORIDE: 600; 310; 30; 20 INJECTION, SOLUTION INTRAVENOUS at 09:07

## 2022-07-18 RX ADMIN — MORPHINE SULFATE 2 MG: 2 INJECTION, SOLUTION INTRAMUSCULAR; INTRAVENOUS at 08:07

## 2022-07-18 RX ADMIN — INDOMETHACIN 100 MG: 50 SUPPOSITORY RECTAL at 10:07

## 2022-07-18 RX ADMIN — EPHEDRINE SULFATE 10 MG: 50 INJECTION INTRAVENOUS at 10:07

## 2022-07-18 RX ADMIN — SUCCINYLCHOLINE CHLORIDE 140 MG: 20 INJECTION, SOLUTION INTRAMUSCULAR; INTRAVENOUS at 09:07

## 2022-07-18 RX ADMIN — MORPHINE SULFATE 2 MG: 2 INJECTION, SOLUTION INTRAMUSCULAR; INTRAVENOUS at 09:07

## 2022-07-18 RX ADMIN — FENTANYL CITRATE 100 MCG: 50 INJECTION, SOLUTION INTRAMUSCULAR; INTRAVENOUS at 09:07

## 2022-07-18 RX ADMIN — ROCURONIUM BROMIDE 5 MG: 10 INJECTION, SOLUTION INTRAVENOUS at 09:07

## 2022-07-18 RX ADMIN — ONDANSETRON 4 MG: 2 INJECTION, SOLUTION INTRAMUSCULAR; INTRAVENOUS at 10:07

## 2022-07-18 RX ADMIN — LIDOCAINE HYDROCHLORIDE 100 MG: 20 INJECTION, SOLUTION EPIDURAL; INFILTRATION; INTRACAUDAL; PERINEURAL at 09:07

## 2022-07-18 RX ADMIN — MORPHINE SULFATE 2 MG: 2 INJECTION, SOLUTION INTRAMUSCULAR; INTRAVENOUS at 04:07

## 2022-07-18 RX ADMIN — MORPHINE SULFATE 2 MG: 2 INJECTION, SOLUTION INTRAMUSCULAR; INTRAVENOUS at 12:07

## 2022-07-18 RX ADMIN — PROPOFOL 150 MG: 10 INJECTION, EMULSION INTRAVENOUS at 09:07

## 2022-07-18 NOTE — ANESTHESIA POSTPROCEDURE EVALUATION
Anesthesia Post Evaluation    Patient: Shanda Marrero    Procedure(s) Performed: Procedure(s) (LRB):  ERCP (ENDOSCOPIC RETROGRADE CHOLANGIOPANCREATOGRAPHY) (N/A)    Final Anesthesia Type: general      Patient location during evaluation: GI PACU  Patient participation: Yes- Able to Participate  Level of consciousness: awake and alert  Post-procedure vital signs: reviewed and stable  Pain management: adequate  VIANEY mitigation strategies: Multimodal analgesia  PONV status at discharge: No PONV  Anesthetic complications: no      Cardiovascular status: hemodynamically stable  Respiratory status: unassisted and room air  Hydration status: euvolemic  Follow-up not needed.          Vitals Value Taken Time   /60 07/18/22 0943   Temp 36.4 °C (97.6 °F) 07/18/22 0716   Pulse 90 07/18/22 0943   Resp 16 07/18/22 0943   SpO2 92 % 07/18/22 0943         No case tracking events are documented in the log.      Pain/Gela Score: Pain Rating Prior to Med Admin: 8 (7/18/2022  8:55 AM)  Pain Rating Post Med Admin: 3 (7/18/2022  4:58 AM)

## 2022-07-18 NOTE — PLAN OF CARE
POC reviewed with patient. verbalized understanding. Remains free of injuries and falls, fall precautions in place. C/o abdominal pain, mod controlled by PRN meds and relaxation techniques. Bed low, SR x2, call light in reach. Reminded to call for assistance. Repositioned independently. Will continue to monitor.

## 2022-07-18 NOTE — PLAN OF CARE
Dr. Altamirano at  to discuss findings. VSS. No  Pain, no GI bleeding. Pt to be discharged from unit.

## 2022-07-18 NOTE — PLAN OF CARE
[facility]  Initial Discharge Assessment       Primary Care Provider: Jean Vasques MD    Admission Diagnosis: Choledocholithiasis [K80.50]  Renal cyst [N28.1]  Pulmonary nodule [R91.1]  Acute gallstone pancreatitis [K85.10]    Admission Date: 7/17/2022  Expected Discharge Date:     Discharge Barriers Identified: None    Payor: HU[employer] MANAGED MEDICARE / Plan: HU[employer] TOTAL CARE ADVANTAGE / Product Type: Medicare Advantage /     Extended Emergency Contact Information  Primary Emergency Contact: Miki Marrero  Mobile Phone: [phone]  Relation: Son  Secondary Emergency Contact: Lauro Inman   [facility]  Home Phone: [phone]  Relation: Brother    Discharge Plan A: Home with family  Discharge Plan B: Home with family      [facility] ([facility]) - [place], [place] - [address]  [address] [facility] [address]  Phone: [phone] Fax: [phone]    [facility] [address] [place] [address]  Phone: [phone] Fax: [phone]  My Chart; Active    Swer spoke with pt for initial assessment. Swer explained role of discharge planner. Pt lives alone and was independent with ADLs prior to admission. Pt's family is help at home and will provide transportation at discharge. Pt denied using any medical equipment. Pt drives self to medical appointments. Pt has an advanced directive. CM needs to be determined based on hospital progress.     Initial Assessment (most recent)     Adult Discharge Assessment - 07/18/22 1352        Discharge Assessment    Assessment Type Discharge Planning Assessment     Source of Information patient     If unable to respond/provide information was family/caregiver contacted? Yes     Contact Name/Number Miki Marrero (son) [phone]     Communicated CLINT with patient/caregiver Date not available/Unable to determine     Reason For  Admission gallstone pancreatitis     Lives With alone     Facility Arrived From: home     Do you expect to return to your current living situation? Yes     Do you have help at home or someone to help you manage your care at home? Yes     Who are your caregiver(s) and their phone number(s)? family     Prior to hospitilization cognitive status: Alert/Oriented     Current cognitive status: Alert/Oriented     Walking or Climbing Stairs Difficulty none     Dressing/Bathing Difficulty none     Home Accessibility wheelchair accessible     Home Layout Able to live on 1st floor     Equipment Currently Used at Home none     Readmission within 30 days? No     Patient currently being followed by outpatient case management? No     Do you currently have service(s) that help you manage your care at home? No     Do you take prescription medications? Yes     Do you have prescription coverage? Yes     Do you have any problems affording any of your prescribed medications? No     Is the patient taking medications as prescribed? yes     Who is going to help you get home at discharge? family     How do you get to doctors appointments? car, drives self     Are you on dialysis? No     Discharge Plan A Home with family     Discharge Plan B Home with family     DME Needed Upon Discharge  none     Discharge Plan discussed with: Patient     Discharge Barriers Identified None

## 2022-07-18 NOTE — PROGRESS NOTES
[facility] Medicine  Progress Note    Patient Name: Shanda Marrero  MRN: [id number]  Patient Class: IP- Inpatient   Admission Date: 7/17/2022  Length of Stay: 1 days  Attending Physician: Mercedes Del Rosario MD  Primary Care Provider: Jean Vasques MD        Subjective:     Principal Problem:Gallstone pancreatitis        HPI:  Ms Marrero is a 71 year old female with PMHx of CAD, HTN, cholecystectomy,  paroxymal A Flutter and HLD who presented to [facility] for evaluation of abdominal pain that started about 2 days ago. Associated symptoms include one episode of nausea and vomiting. Denies associated symptoms of fever, chills, chest pain, shortness of breath or diaphoresis. CT of the abdomin/pelvis showed peripancreatic inflammatory changes most consistent with pancreatitis, marked dilation of the distal common bile duct measuring up to 2.3 cm is suggested with a 9 mm radiodensity concerning for choledocholithiasis, intrahepatic and extrahepatic ductal dilation noted as well.  Lipase > 1000, total bilirubin 8.2 with elevated LFT as well. GI consulted, planed ERCP for today. Patient is a full code, son is surrogate decision maker. She is being admitted under the care of Hospital Medicine.       Overview/Hospital Course:  Patient is a 71 year old female who presented to [facility] for evaluation of abdominal pain. Diagnosis with gallstone pancreatitis, she underwent ERCP this AM with a 1 cm distal bile duct stone was retrieved. Case reviewed with Dr Altamirano, will continue treatment of pancreatitis. Start clear liquid diet, advance as tolerated. Lipase > 1000 and LFT's elevated this AM, as well. Will follow.         Interval History:     Review of Systems   Constitutional:  Positive for appetite change (decreased) and fatigue. Negative for chills and fever.   HENT:  Negative for congestion, rhinorrhea and sinus pressure.    Respiratory:  Negative for apnea, cough, choking, chest tightness, shortness of breath,  wheezing and stridor.    Cardiovascular:  Negative for chest pain, palpitations and leg swelling.   Gastrointestinal:  Positive for abdominal pain. Negative for abdominal distention, diarrhea, nausea (one episiode) and vomiting (one episiode).   Endocrine: Negative for cold intolerance and heat intolerance.   Genitourinary:  Negative for difficulty urinating and hematuria.   Musculoskeletal:  Negative for arthralgias and joint swelling.   Skin:  Negative for color change, pallor and rash.   Neurological:  Positive for weakness. Negative for dizziness, seizures, numbness and headaches.   Psychiatric/Behavioral:  Negative for agitation. The patient is not nervous/anxious.    All other systems reviewed and are negative.  Objective:     Vital Signs (Most Recent):  Temp: 98 °F (36.7 °C) (07/18/22 1549)  Pulse: 79 (07/18/22 1549)  Resp: 17 (07/18/22 1549)  BP: 131/67 (07/18/22 1549)  SpO2: 96 % (07/18/22 1549) Vital Signs (24h Range):  Temp:  [97.6 °F (36.4 °C)-100.4 °F (38 °C)] 98 °F (36.7 °C)  Pulse:  [] 79  Resp:  [14-20] 17  SpO2:  [92 %-97 %] 96 %  BP: (113-140)/(56-77) 131/67     Weight: 68 kg (150 lb)  Body mass index is 27.44 kg/m².    Intake/Output Summary (Last 24 hours) at 7/18/2022 1553  Last data filed at 7/18/2022 1037  Gross per 24 hour   Intake 1550 ml   Output --   Net 1550 ml      Physical Exam  Vitals and nursing note reviewed.   Constitutional:       General: She is not in acute distress.     Appearance: She is not ill-appearing or toxic-appearing.   HENT:      Head: Normocephalic and atraumatic.   Eyes:      General:         Right eye: No discharge.         Left eye: No discharge.   Cardiovascular:      Heart sounds: No murmur heard.    No friction rub. No gallop.   Pulmonary:      Effort: No respiratory distress.      Breath sounds: No stridor. No wheezing, rhonchi or rales.   Chest:      Chest wall: No tenderness.   Abdominal:      General: There is no distension.      Palpations: There is no  mass.      Tenderness: There is abdominal tenderness. There is no right CVA tenderness, left CVA tenderness, guarding or rebound.      Hernia: No hernia is present.   Musculoskeletal:         General: No swelling, tenderness, deformity or signs of injury.      Right lower leg: No edema.      Left lower leg: No edema.   Skin:     Coloration: Skin is not jaundiced or pale.      Findings: No bruising, erythema, lesion or rash.   Neurological:      Cranial Nerves: No cranial nerve deficit.      Sensory: No sensory deficit.      Motor: No weakness.      Coordination: Coordination normal.      Gait: Gait normal.      Deep Tendon Reflexes: Reflexes normal.       Significant Labs: All pertinent labs within the past 24 hours have been reviewed.  CBC:   Recent Labs   Lab 07/17/22 0326 07/18/22 0522   WBC 10.31 9.83   HGB 13.7 12.2   HCT 41.1 38.4    214     CMP:   Recent Labs   Lab 07/17/22 0326 07/18/22 0522    139   K 3.6 3.6    105   CO2 26 21*   * 75   BUN 5* 7*   CREATININE 0.6 0.6   CALCIUM 9.9 8.4*   PROT 7.3 5.2*   ALBUMIN 3.5 2.8*   BILITOT 8.4* 8.5*   ALKPHOS 405* 398*   * 311*   * 336*   ANIONGAP 11 13   EGFRNONAA >60 >60     Lipase:   Recent Labs   Lab 07/17/22 0326 07/18/22 0522   LIPASE >1000* >1000*       Significant Imaging:     Imaging Results              CT Abdomen Pelvis  Without Contrast (Final result)  Result time 07/17/22 09:27:48      Final result by Nawaf Ferrer MD (07/17/22 09:27:48)                   Impression:      1.  Peripancreatic inflammatory changes most consistent with pancreatitis.  Marked dilation of the distal common bile duct measuring up to 2.3 cm is suggested with a 9 mm radiodensity concerning for choledocholithiasis.  Intrahepatic and extrahepatic ductal dilation noted as well.  The patient would benefit from an MRCP.    2. Multiple nodules in a bronchovascular distribution in the middle lobe measuring up to 8 mm.  Findings are most  likely infectious or inflammatory nature.  Clinical correlation is advised.  Follow-up CT scan in 8-12 weeks recommended to re-evaluate after treatment of presumed infectious or inflammatory process.    3.  Negative for acute process otherwise.  No other inflammatory changes are seen.  Normal appendix.  Negative for renal stone disease or hydronephrosis.  Negative for free air.    4.  Large hiatal hernia.  Coronary artery calcifications.  Old granulomatous disease.  Cholecystectomy clips.  2.1 cm left renal cyst.  Bilateral extrarenal pelves.  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  Degenerative changes of the spine and pelvis.  Scattered colonic diverticula.  Other nonemergent findings as described above.    5.  I agree with the preliminary interpretation rendered.    All CT scans at this facility are performed  using dose modulation techniques as appropriate to performed exam including the following:  automated exposure control; adjustment of mA and/or kV according to the patients size (this includes techniques or standardized protocols for targeted exams where dose is matched to indication/reason for exam: i.e. extremities or head);  iterative reconstruction technique.      Electronically signed by: Nawaf Ferrer MD  Date:    07/17/2022  Time:    09:27               Narrative:    EXAMINATION:  CT ABDOMEN PELVIS WITHOUT CONTRAST, multiplanar reconstructions    CLINICAL HISTORY:  Abdominal abscess/infection suspected;    TECHNIQUE:  Axial images through the abdomen and pelvis were obtained without the use of IV contrast.  Sagittal and coronal reconstructions are provided for review.  Oral contrast was not utilized.    COMPARISON:  Abdominal ultrasound from August 12, 2021    FINDINGS:  LUNG BASES: There are numerous nodules in a bronchovascular distribution within the middle lobe, measuring up to 8 mm in size.  Dependent atelectasis in the lung bases.  Left lower lobe calcified granuloma.  Lung bases are otherwise  clear.  Negative for pleural or pericardial effusions. The distal esophagus is normal.  Large hiatal hernia.  Coronary artery calcifications.  Calcified left hilar lymph nodes.    ABDOMEN: There are inflammatory changes along the pancreas.  Status post cholecystectomy.  Mild intrahepatic and extrahepatic ductal dilation with the common bile duct measuring 1.1 cm.  The distal common bile duct appears to be markedly dilated to 2.3 cm with a 9 mm radiodensity in the dilated distal common bile duct.  Calcified granuloma within the spleen.  2.1 cm medial left renal cyst.  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  Bilateral extrarenal pelves.  The liver and spleen otherwise appear normal.  The pancreas is otherwise normal.  Kidneys and adrenal glands are otherwise normal.    Negative for adenopathy, or ascites noted within the abdomen or pelvis.  Vascular calcifications are present without aneurysmal changes.    The bowel appears normal. Normal appendix.  Negative for free air.  Scattered colonic diverticula.    PELVIS: The urinary bladder is unremarkable.  The uterus is tilted to the left.  The female pelvic organs are otherwise normal.  There are pelvic phleboliths.    Tiny fat filled umbilical hernia.  The abdominal wall is otherwise intact.    No significant osseous abnormality is identified.  Negative for significant spinal canal stenosis. Osteopenia.  Multilevel marginal spondylosis and degenerative facet arthropathy.  Multilevel Schmorl node formation.  Convex-left curvature of the lower lumbar spine.  Mild degenerative changes of the pelvis.    Negative for groin adenopathy.                                         Assessment/Plan:      * Gallstone pancreatitis  Admit to Hospital Medicine   Lipase > 1000  Consult GI   Aggressive IVF's   Pain control   NPO   Plan ERCP today by Dr Altamirano   Hold Plavix       7/18/2022  S/P ERCP with stone retrieval   Continue aggressive IVF's   Start Clear liquid diet, advance as  tolerated  Pain control        Choledocholithiasis    GI consulted   Plan ERCP today   NPO   Hold Plavix       7/18/2022  GI following   S/p ERCP this AM   Start Clear liquid diet   Restart Plavix tomorrow       Elevated LFTs  Probable related to choledocholithiases   Monitor liver function     7/18/2022  S/p ERCP with stone retrieval  Hold Statin for now due to elevated LFT's        Coronary artery disease, occlusive  Continue Statin, B blocker and Plavix when tolerating PO       Dyslipidemia  Continue Statin when tolerating PO       Atrial flutter, paroxysmal  Continue B blocker and Plavix when tolerating PO   Patient currently not on oral anticoagulation at home         VTE Risk Mitigation (From admission, onward)         Ordered     Place sequential compression device  Until discontinued         07/17/22 1451                Discharge Planning   CLINT:      Code Status: Prior   Is the patient medically ready for discharge?:     Reason for patient still in hospital (select all that apply): Patient trending condition and Treatment  Discharge Plan A: Home with family                  Dru Jaramillo NP  Department of Hospital Medicine   [facility]

## 2022-07-18 NOTE — TRANSFER OF CARE
"Anesthesia Transfer of Care Note    Patient: Shanda Marrero    Procedure(s) Performed: Procedure(s) (LRB):  ERCP (ENDOSCOPIC RETROGRADE CHOLANGIOPANCREATOGRAPHY) (N/A)    Patient location: GI    Anesthesia Type: general    Transport from OR: Transported from OR on room air with adequate spontaneous ventilation    Post pain: adequate analgesia    Post assessment: no apparent anesthetic complications    Post vital signs: stable    Level of consciousness: awake, alert and oriented    Nausea/Vomiting: no nausea/vomiting    Complications: none    Transfer of care protocol was followed      Last vitals:   Visit Vitals  /60   Pulse 90   Temp 36.4 °C (97.6 °F) (Oral)   Resp 16   Ht 5' 2" (1.575 m)   Wt 68 kg (150 lb)   SpO2 (!) 92%   BMI 27.44 kg/m²     "

## 2022-07-18 NOTE — ASSESSMENT & PLAN NOTE
GI consulted   Plan ERCP today   NPO   Hold Plavix       7/18/2022  GI following   S/p ERCP this AM   Start Clear liquid diet   Restart Plavix tomorrow

## 2022-07-18 NOTE — HOSPITAL COURSE
Patient is a 71 year old female who presented to [facility] for evaluation of abdominal pain. Diagnosis with gallstone pancreatitis, she underwent ERCP this AM with a 1 cm distal bile duct stone was retrieved. Case reviewed with Dr Altamirano, will continue treatment of pancreatitis. Start clear liquid diet, advance as tolerated. Lipase > 1000 and LFT's elevated this AM.  A of 7/19/2022, patient symptoms improving, tolerating diet. Lipace down to 58. Case reviewed with GI, ok to discharge home if she is doing well clinically. Plavix for 5 days, per GI recommendations. Hold statin until follow up with PCP or GI due to elevated LFT's. Patient was seen, examined and deemed suital

## 2022-07-18 NOTE — ANESTHESIA PREPROCEDURE EVALUATION
07/18/2022  Shanda Marrero is a 71 y.o., female.      Pre-op Assessment    I have reviewed the Patient Summary Reports.     I have reviewed the Nursing Notes. I have reviewed the NPO Status.   I have reviewed the Medications.     Review of Systems  Anesthesia Hx:  No problems with previous Anesthesia    Social:  Former Smoker, Social Alcohol Use    EENT/Dental:EENT/Dental Normal   Cardiovascular:   Hypertension CAD asymptomatic Dysrhythmias  ECG has been reviewed. A. Flutter   · The left ventricle is normal in size with mildly decreased systolic function.  · Grade I left ventricular diastolic dysfunction.  · The estimated PA systolic pressure is 26 mmHg.  · Normal central venous pressure (3 mmHg).  · Mild tricuspid regurgitation.  · The estimated ejection fraction is 45-50%.  · There are segmental left ventricular wall motion abnormalities.  · There is mild aortic valve stenosis.  · Aortic valve area is 1.29 cm2; peak velocity is 1.6 m/s; mean gradient is 6 mmHg.  · Normal right ventricular size with normal right ventricular systolic function     Pulmonary:  Pulmonary Normal    Renal/:  Renal/ Normal     Hepatic/GI:   GERD Pancreatitis    Musculoskeletal:  Musculoskeletal Normal    Neurological:  Neurology Normal    Endocrine:  Endocrine Normal        Physical Exam  General: Cooperative, Alert and Oriented    Airway:  Mallampati: II   Mouth Opening: Normal  TM Distance: Normal  Tongue: Normal  Neck ROM: Normal ROM    Dental:  Intact    Chest/Lungs:  Clear to auscultation    Heart:  Rate: Normal        Anesthesia Plan  Type of Anesthesia, risks & benefits discussed:    Anesthesia Type: Gen ETT  Intra-op Monitoring Plan: Standard ASA Monitors  Post Op Pain Control Plan: multimodal analgesia  Induction:  IV  Airway Plan: Direct  Informed Consent: Informed consent signed with the Patient and all parties  understand the risks and agree with anesthesia plan.  All questions answered.   ASA Score: 3  Day of Surgery Review of History & Physical: H&P Update referred to the surgeon/provider.I have interviewed and examined the patient. I have reviewed the patient's H&P dated: There are no significant changes.     Ready For Surgery From Anesthesia Perspective.     .

## 2022-07-18 NOTE — H&P
PRE PROCEDURE H&P    Patient Name: Shanda Marrero  MRN: [id number]  : 1950  Date of Procedure:  2022  Referring Physician: Self, Aaareferral  Primary Physician: Jena Vasques MD  Procedure Physician: Nohelia Altamirano MD       Planned Procedure: ERCP  Diagnosis: CBD stone  Chief Complaint: Same as above    HPI: Patient is an 71 y.o. female is here for the above.     ERCP is currently recommended. The risks, benefits and alternatives of the procedure were discussed with the patient in detail. Benefits include removal of stones, stents, debri, sludge; dilation; placement of a stent; biopsies. Risks include bleeding (0.3-2%), pancreatitis (3%), cholangitis (0.5-3%), perforation (0.08-0.6%), cholecystitis (0.5%), sedation related adverse events. Educational material of the biliary anatomy has been provided today for educational purposes. Other risks include, risks of adverse reaction to sedation requiring the use of reversal agents, bleeding requiring blood transfusion, perforation requiring surgical intervention, technical failure, aspiration leading to respiratory distress and respiratory failure resulting in endotracheal intubation and mechanical ventilation including death. Anesthesia is utilized for this procedure, it is up to the anesthesiologist to determine airway safety including elective endotracheal intubation. Questions were answered, the patient agree to proceed. There was no language barriers.       Past Medical History:   Past Medical History:   Diagnosis Date    *Atrial flutter     PAROXYSMAL    Acute coronary syndrome     OLD INF MI    Coronary artery disease     Hyperlipidemia     Hypertension         Past Surgical History:  Past Surgical History:   Procedure Laterality Date    CATARACT EXTRACTION Left 2022    TORIC    CATARACT EXTRACTION Right 2022    Toric    CATARACT EXTRACTION W/ INTRAOCULAR LENS IMPLANT Left 2022    Ora/ Toric    CORONARY ANGIOPLASTY   "2009    RCA AND LCX- LISA    GALLBLADDER SURGERY  10/2021    LAPAROSCOPIC CHOLECYSTECTOMY N/A 2021    Procedure: CHOLECYSTECTOMY, LAPAROSCOPIC;  Surgeon: Balwinder Castrejon MD;  Location: [facility];  Service: General;  Laterality: N/A;        Home Medications:  Prior to Admission medications    Medication Sig Start Date End Date Taking? Authorizing Provider   CARAFATE 1 gram tablet Take 1 g by mouth 4 (four) times daily. 22  Yes Historical Provider   clopidogreL (PLAVIX) 75 mg tablet TAKE 1 TABLET EVERY DAY 21  Yes Colette Knott PA-C   metoprolol succinate (TOPROL-XL) 25 MG 24 hr tablet TAKE 1 TABLET EVERY DAY 21  Yes Colette Knott PA-C   omeprazole (PRILOSEC) 40 MG capsule TAKE 1 CAPSULE EVERY DAY 21  Yes Colette Knott PA-C   pantoprazole (PROTONIX) 40 MG tablet Take 40 mg by mouth once daily. 22  Yes Historical Provider   simvastatin (ZOCOR) 40 MG tablet TAKE 1 TABLET EVERY EVENING. 21  Yes Rosalio León MD        Allergies:  Review of patient's allergies indicates:   Allergen Reactions    No known allergies         Social History:   Social History     Socioeconomic History    Marital status:    Tobacco Use    Smoking status: Former Smoker     Quit date: 2009     Years since quittin.8    Smokeless tobacco: Never Used   Substance and Sexual Activity    Alcohol use: Yes     Comment: occassionally    Drug use: No       Family History:  Family History   Problem Relation Age of Onset    Heart attack Father     Heart disease Father        ROS: No acute cardiac events, no acute respiratory complaints.     Physical Exam (all patients):    /60   Pulse 90   Temp 97.6 °F (36.4 °C) (Oral)   Resp 16   Ht 5' 2" (1.575 m)   Wt 68 kg (150 lb)   SpO2 (!) 92%   BMI 27.44 kg/m²   Lungs: Clear to auscultation bilaterally, respirations unlabored  Heart: Regular rate and rhythm, S1 and S2 normal, no obvious murmurs  Abdomen:         Soft, " non-tender, bowel sounds normal, no masses, no organomegaly    Lab Results   Component Value Date    WBC 9.83 07/18/2022    MCV 87 07/18/2022    RDW 14.6 (H) 07/18/2022     07/18/2022    INR 1.0 10/16/2009    GLU 75 07/18/2022    BUN 7 (L) 07/18/2022     07/18/2022    K 3.6 07/18/2022     07/18/2022        SEDATION PLAN: per anesthesia      History reviewed, vital signs satisfactory, cardiopulmonary status satisfactory, sedation options, risks and plans have been discussed with the patient  All their questions were answered and the patient agrees to the sedation procedures as planned and the patient is deemed an appropriate candidate for the sedation as planned.    Procedure explained to patient, informed consent obtained and placed in chart.    Nohelia Altamirano  7/18/2022  9:52 AM

## 2022-07-18 NOTE — [employer]ATION PATIENT INSTRUCTIONS
Discharge Summary/Instructions after an Endoscopic Procedure  Patient Name: Shanda Marrero  Patient MRN: [id number]  Patient YOB: 1950  Monday, July 18, 2022 Nohelia Altamirano MD  Dear patient,  As a result of recent federal legislation (The Federal Cures Act), you may   receive lab or pathology results from your procedure in your [facility]   account before your physician is able to contact you. Your physician or   their representative will relay the results to you with their   recommendations at their soonest availability.  Thank you,  RESTRICTIONS:  During your procedure today, you received medications for sedation.  These   medications may affect your judgment, balance and coordination.  Therefore,   for 24 hours, you have the following restrictions:   - DO NOT drive a car, operate machinery, make legal/financial decisions,   sign important papers or drink alcohol.    ACTIVITY:  Today: no heavy lifting, straining or running due to procedural   sedation/anesthesia.  The following day: return to full activity including work.  DIET:  Eat and drink normally unless instructed otherwise.     TREATMENT FOR COMMON SIDE EFFECTS:  - Mild abdominal pain, nausea, belching, bloating or excessive gas:  rest,   eat lightly and use a heating pad.  - Sore Throat: treat with throat lozenges and/or gargle with warm salt   water.  - Because air was used during the procedure, expelling large amounts of air   from your rectum or belching is normal.  - If a bowel prep was taken, you may not have a bowel movement for 1-3 days.    This is normal.  SYMPTOMS TO WATCH FOR AND REPORT TO YOUR PHYSICIAN:  1. Abdominal pain or bloating, other than gas cramps.  2. Chest pain.  3. Back pain.  4. Signs of infection such as: chills or fever occurring within 24 hours   after the procedure.  5. Rectal bleeding, which would show as bright red, maroon, or black stools.   (A tablespoon of blood from the rectum is not serious, especially  if   hemorrhoids are present.)  6. Vomiting.  7. Weakness or dizziness.  GO DIRECTLY TO THE NEAREST EMERGENCY ROOM IF YOU HAVE ANY OF THE FOLLOWING:      Difficulty breathing              Chills and/or fever over 101 F   Persistent vomiting and/or vomiting blood   Severe abdominal pain   Severe chest pain   Black, tarry stools   Bleeding- more than one tablespoon   Any other symptom or condition that you feel may need urgent attention  Your doctor recommends these additional instructions:  If any biopsies were taken, your doctors clinic will contact you in 1 to 2   weeks with any results.  - Resume previous diet.   - Continue present medications.   - Return patient to hospital block for ongoing care.   - Repeat ERCP in 2 months to remove stent.   - Conitnue treatment of her acute pancreatitis with IVF, pain control.  For questions, problems or results please call your physician Nohelia Altamirano MD at Work:  [phone]  If you have any questions about the above instructions, call the GI   department at [phone] or call the endoscopy unit at [phone]   from 7am until 3 pm.  [facility], EMERGENCY ROOM PHONE NUMBER:   [phone]  IF A COMPLICATION OR EMERGENCY SITUATION ARISES AND YOU ARE UNABLE TO REACH   YOUR PHYSICIAN - GO DIRECTLY TO THE EMERGENCY ROOM.  I have read or have had read to me these discharge instructions for my   procedure and have received a written copy.  I understand these   instructions and will follow-up with my physician if I have any questions.     __________________________________       _____________________________________  Nurse Signature                                          Patient/Designated   Responsible Party Signature  MD Nohelia Arreola MD  7/18/2022 10:29:11 AM  This report has been verified and signed electronically.  Dear patient,  As a result of recent federal legislation (The Federal Cures  Act), you may   receive lab or pathology results from your procedure in your [facility]   account before your physician is able to contact you. Your physician or   their representative will relay the results to you with their   recommendations at their soonest availability.  Thank you,  PROVATION

## 2022-07-18 NOTE — SUBJECTIVE & OBJECTIVE
Interval History:     Review of Systems   Constitutional:  Positive for appetite change (decreased) and fatigue. Negative for chills and fever.   HENT:  Negative for congestion, rhinorrhea and sinus pressure.    Respiratory:  Negative for apnea, cough, choking, chest tightness, shortness of breath, wheezing and stridor.    Cardiovascular:  Negative for chest pain, palpitations and leg swelling.   Gastrointestinal:  Positive for abdominal pain. Negative for abdominal distention, diarrhea, nausea (one episiode) and vomiting (one episiode).   Endocrine: Negative for cold intolerance and heat intolerance.   Genitourinary:  Negative for difficulty urinating and hematuria.   Musculoskeletal:  Negative for arthralgias and joint swelling.   Skin:  Negative for color change, pallor and rash.   Neurological:  Positive for weakness. Negative for dizziness, seizures, numbness and headaches.   Psychiatric/Behavioral:  Negative for agitation. The patient is not nervous/anxious.    All other systems reviewed and are negative.  Objective:     Vital Signs (Most Recent):  Temp: 98 °F (36.7 °C) (07/18/22 1549)  Pulse: 79 (07/18/22 1549)  Resp: 17 (07/18/22 1549)  BP: 131/67 (07/18/22 1549)  SpO2: 96 % (07/18/22 1549) Vital Signs (24h Range):  Temp:  [97.6 °F (36.4 °C)-100.4 °F (38 °C)] 98 °F (36.7 °C)  Pulse:  [] 79  Resp:  [14-20] 17  SpO2:  [92 %-97 %] 96 %  BP: (113-140)/(56-77) 131/67     Weight: 68 kg (150 lb)  Body mass index is 27.44 kg/m².    Intake/Output Summary (Last 24 hours) at 7/18/2022 1553  Last data filed at 7/18/2022 1037  Gross per 24 hour   Intake 1550 ml   Output --   Net 1550 ml      Physical Exam  Vitals and nursing note reviewed.   Constitutional:       General: She is not in acute distress.     Appearance: She is not ill-appearing or toxic-appearing.   HENT:      Head: Normocephalic and atraumatic.   Eyes:      General:         Right eye: No discharge.         Left eye: No discharge.   Cardiovascular:       Heart sounds: No murmur heard.    No friction rub. No gallop.   Pulmonary:      Effort: No respiratory distress.      Breath sounds: No stridor. No wheezing, rhonchi or rales.   Chest:      Chest wall: No tenderness.   Abdominal:      General: There is no distension.      Palpations: There is no mass.      Tenderness: There is abdominal tenderness. There is no right CVA tenderness, left CVA tenderness, guarding or rebound.      Hernia: No hernia is present.   Musculoskeletal:         General: No swelling, tenderness, deformity or signs of injury.      Right lower leg: No edema.      Left lower leg: No edema.   Skin:     Coloration: Skin is not jaundiced or pale.      Findings: No bruising, erythema, lesion or rash.   Neurological:      Cranial Nerves: No cranial nerve deficit.      Sensory: No sensory deficit.      Motor: No weakness.      Coordination: Coordination normal.      Gait: Gait normal.      Deep Tendon Reflexes: Reflexes normal.       Significant Labs: All pertinent labs within the past 24 hours have been reviewed.  CBC:   Recent Labs   Lab 07/17/22 0326 07/18/22 0522   WBC 10.31 9.83   HGB 13.7 12.2   HCT 41.1 38.4    214     CMP:   Recent Labs   Lab 07/17/22 0326 07/18/22 0522    139   K 3.6 3.6    105   CO2 26 21*   * 75   BUN 5* 7*   CREATININE 0.6 0.6   CALCIUM 9.9 8.4*   PROT 7.3 5.2*   ALBUMIN 3.5 2.8*   BILITOT 8.4* 8.5*   ALKPHOS 405* 398*   * 311*   * 336*   ANIONGAP 11 13   EGFRNONAA >60 >60     Lipase:   Recent Labs   Lab 07/17/22 0326 07/18/22 0522   LIPASE >1000* >1000*       Significant Imaging:     Imaging Results              CT Abdomen Pelvis  Without Contrast (Final result)  Result time 07/17/22 09:27:48      Final result by Nawaf Ferrer MD (07/17/22 09:27:48)                   Impression:      1.  Peripancreatic inflammatory changes most consistent with pancreatitis.  Marked dilation of the distal common bile duct measuring up to 2.3  cm is suggested with a 9 mm radiodensity concerning for choledocholithiasis.  Intrahepatic and extrahepatic ductal dilation noted as well.  The patient would benefit from an MRCP.    2. Multiple nodules in a bronchovascular distribution in the middle lobe measuring up to 8 mm.  Findings are most likely infectious or inflammatory nature.  Clinical correlation is advised.  Follow-up CT scan in 8-12 weeks recommended to re-evaluate after treatment of presumed infectious or inflammatory process.    3.  Negative for acute process otherwise.  No other inflammatory changes are seen.  Normal appendix.  Negative for renal stone disease or hydronephrosis.  Negative for free air.    4.  Large hiatal hernia.  Coronary artery calcifications.  Old granulomatous disease.  Cholecystectomy clips.  2.1 cm left renal cyst.  Bilateral extrarenal pelves.  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  Degenerative changes of the spine and pelvis.  Scattered colonic diverticula.  Other nonemergent findings as described above.    5.  I agree with the preliminary interpretation rendered.    All CT scans at this facility are performed  using dose modulation techniques as appropriate to performed exam including the following:  automated exposure control; adjustment of mA and/or kV according to the patients size (this includes techniques or standardized protocols for targeted exams where dose is matched to indication/reason for exam: i.e. extremities or head);  iterative reconstruction technique.      Electronically signed by: Nawaf Ferrer MD  Date:    07/17/2022  Time:    09:27               Narrative:    EXAMINATION:  CT ABDOMEN PELVIS WITHOUT CONTRAST, multiplanar reconstructions    CLINICAL HISTORY:  Abdominal abscess/infection suspected;    TECHNIQUE:  Axial images through the abdomen and pelvis were obtained without the use of IV contrast.  Sagittal and coronal reconstructions are provided for review.  Oral contrast was not  utilized.    COMPARISON:  Abdominal ultrasound from August 12, 2021    FINDINGS:  LUNG BASES: There are numerous nodules in a bronchovascular distribution within the middle lobe, measuring up to 8 mm in size.  Dependent atelectasis in the lung bases.  Left lower lobe calcified granuloma.  Lung bases are otherwise clear.  Negative for pleural or pericardial effusions. The distal esophagus is normal.  Large hiatal hernia.  Coronary artery calcifications.  Calcified left hilar lymph nodes.    ABDOMEN: There are inflammatory changes along the pancreas.  Status post cholecystectomy.  Mild intrahepatic and extrahepatic ductal dilation with the common bile duct measuring 1.1 cm.  The distal common bile duct appears to be markedly dilated to 2.3 cm with a 9 mm radiodensity in the dilated distal common bile duct.  Calcified granuloma within the spleen.  2.1 cm medial left renal cyst.  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  Bilateral extrarenal pelves.  The liver and spleen otherwise appear normal.  The pancreas is otherwise normal.  Kidneys and adrenal glands are otherwise normal.    Negative for adenopathy, or ascites noted within the abdomen or pelvis.  Vascular calcifications are present without aneurysmal changes.    The bowel appears normal. Normal appendix.  Negative for free air.  Scattered colonic diverticula.    PELVIS: The urinary bladder is unremarkable.  The uterus is tilted to the left.  The female pelvic organs are otherwise normal.  There are pelvic phleboliths.    Tiny fat filled umbilical hernia.  The abdominal wall is otherwise intact.    No significant osseous abnormality is identified.  Negative for significant spinal canal stenosis. Osteopenia.  Multilevel marginal spondylosis and degenerative facet arthropathy.  Multilevel Schmorl node formation.  Convex-left curvature of the lower lumbar spine.  Mild degenerative changes of the pelvis.    Negative for groin adenopathy.

## 2022-07-18 NOTE — ANESTHESIA PROCEDURE NOTES
Intubation    Date/Time: 7/18/2022 9:59 AM  Performed by: Janene Benton CRNA  Authorized by: Lauro Vann II, MD     Intubation:     Induction:  Intravenous    Intubated:  Postinduction    Mask Ventilation:  Easy mask    Attempts:  2    Attempted By:  CRNA    Method of Intubation:  Direct    Blade:  Melendez 2    Laryngeal View Grade: Grade IV - neither epiglottis nor glottis seen      Attempted By (2nd Attempt):  CRNA    Method of Intubation (2nd Attempt):  Video laryngoscopy    Blade (2nd Attempt):  Monson 3    Laryngeal View Grade (2nd Attempt): Grade I - full view of cords      Difficult Airway Encountered?: No      Complications:  None    Airway Device:  Oral endotracheal tube    Airway Device Size:  7.0    Style/Cuff Inflation:  Cuffed (inflated to minimal occlusive pressure)    Tube secured:  22    Secured at:  The lips    Placement Verified By:  Capnometry    Complicating Factors:  None    Findings Post-Intubation:  BS equal bilateral and atraumatic/condition of teeth unchanged

## 2022-07-18 NOTE — ASSESSMENT & PLAN NOTE
Probable related to choledocholithiases   Monitor liver function     7/18/2022  S/p ERCP with stone retrieval  Hold Statin for now due to elevated LFT's

## 2022-07-18 NOTE — ASSESSMENT & PLAN NOTE
Admit to Hospital Medicine   Lipase > 1000  Consult GI   Aggressive IVF's   Pain control   NPO   Plan ERCP today by Dr Altamirano   Hold Plavix       7/18/2022  S/P ERCP with stone retrieval   Continue aggressive IVF's   Start Clear liquid diet, advance as tolerated  Pain control

## 2022-07-19 VITALS
HEART RATE: 94 BPM | RESPIRATION RATE: 18 BRPM | WEIGHT: 150 LBS | OXYGEN SATURATION: 93 % | TEMPERATURE: 98 F | SYSTOLIC BLOOD PRESSURE: 128 MMHG | DIASTOLIC BLOOD PRESSURE: 64 MMHG | BODY MASS INDEX: 27.6 KG/M2 | HEIGHT: 62 IN

## 2022-07-19 LAB
ALBUMIN SERPL BCP-MCNC: 2.6 G/DL (ref 3.5–5.2)
ALP SERPL-CCNC: 389 U/L (ref 55–135)
ALT SERPL W/O P-5'-P-CCNC: 326 U/L (ref 10–44)
ANION GAP SERPL CALC-SCNC: 11 MMOL/L (ref 8–16)
AST SERPL-CCNC: 294 U/L (ref 10–40)
BILIRUB SERPL-MCNC: 7 MG/DL (ref 0.1–1)
BUN SERPL-MCNC: 4 MG/DL (ref 8–23)
CALCIUM SERPL-MCNC: 8.3 MG/DL (ref 8.7–10.5)
CHLORIDE SERPL-SCNC: 102 MMOL/L (ref 95–110)
CO2 SERPL-SCNC: 25 MMOL/L (ref 23–29)
CREAT SERPL-MCNC: 0.6 MG/DL (ref 0.5–1.4)
EST. GFR  (AFRICAN AMERICAN): >60 ML/MIN/1.73 M^2
EST. GFR  (NON AFRICAN AMERICAN): >60 ML/MIN/1.73 M^2
GLUCOSE SERPL-MCNC: 78 MG/DL (ref 70–110)
LIPASE SERPL-CCNC: 58 U/L (ref 4–60)
POTASSIUM SERPL-SCNC: 3.5 MMOL/L (ref 3.5–5.1)
PROT SERPL-MCNC: 5.1 G/DL (ref 6–8.4)
SODIUM SERPL-SCNC: 138 MMOL/L (ref 136–145)

## 2022-07-19 PROCEDURE — 99232 SBSQ HOSP IP/OBS MODERATE 35: CPT | Mod: ,,, | Performed by: PHYSICIAN ASSISTANT

## 2022-07-19 PROCEDURE — 36415 COLL VENOUS BLD VENIPUNCTURE: CPT | Performed by: NURSE PRACTITIONER

## 2022-07-19 PROCEDURE — 83690 ASSAY OF LIPASE: CPT | Performed by: NURSE PRACTITIONER

## 2022-07-19 PROCEDURE — 63600175 PHARM REV CODE 636 W HCPCS: Performed by: NURSE PRACTITIONER

## 2022-07-19 PROCEDURE — 80053 COMPREHEN METABOLIC PANEL: CPT | Performed by: NURSE PRACTITIONER

## 2022-07-19 PROCEDURE — 25000003 PHARM REV CODE 250: Performed by: PHYSICIAN ASSISTANT

## 2022-07-19 PROCEDURE — 99232 PR SUBSEQUENT HOSPITAL CARE,LEVL II: ICD-10-PCS | Mod: ,,, | Performed by: PHYSICIAN ASSISTANT

## 2022-07-19 RX ORDER — SODIUM CHLORIDE 9 MG/ML
1000 INJECTION, SOLUTION INTRAVENOUS CONTINUOUS
Status: DISCONTINUED | OUTPATIENT
Start: 2022-07-19 | End: 2022-07-19 | Stop reason: HOSPADM

## 2022-07-19 RX ADMIN — SODIUM CHLORIDE 1000 ML: 0.9 INJECTION, SOLUTION INTRAVENOUS at 08:07

## 2022-07-19 RX ADMIN — MORPHINE SULFATE 2 MG: 2 INJECTION, SOLUTION INTRAMUSCULAR; INTRAVENOUS at 03:07

## 2022-07-19 NOTE — PLAN OF CARE
Patient remains free of injury. AAOx4. POC reviewed, patient verbalizes understanding. Denies pain or discomfort at this time.  IV d/c. Full liquid diet tolerated. Chart check complete. Patient to discharge home with family.    Problem: Adult Inpatient Plan of Care  Goal: Plan of Care Review  Outcome: Met  Goal: Patient-Specific Goal (Individualized)  Outcome: Met  Goal: Absence of Hospital-Acquired Illness or Injury  Outcome: Met  Goal: Optimal Comfort and Wellbeing  Outcome: Met  Goal: Readiness for Transition of Care  Outcome: Met

## 2022-07-19 NOTE — PLAN OF CARE
[facility]  Discharge Final Note    Primary Care Provider: Jean Vasques MD    Expected Discharge Date: 7/19/2022    Final Discharge Note (most recent)     Final Note - 07/19/22 1043        Final Note    Assessment Type Final Discharge Note     Anticipated Discharge Disposition Home or Self Care     Hospital Resources/Appts/Education Provided Provided patient/caregiver with written discharge plan information;Appointments scheduled and added to AVS        Post-Acute Status    Discharge Delays None known at this time               Hospital F/U appt added to AVS    Important Message from Medicare

## 2022-07-19 NOTE — SUBJECTIVE & OBJECTIVE
Subjective:     Interval History: S/p ERCP with stone removal. The patient reports abdominal discomfort but much better than yesterday. No n/v with clear liquids.      Review of Systems   Constitutional:         See Interval History for daily ROS.    Objective:     Vital Signs (Most Recent):  Temp: 98.7 °F (37.1 °C) (07/19/22 0728)  Pulse: 80 (07/19/22 0728)  Resp: 14 (07/19/22 0728)  BP: (!) 134/58 (07/19/22 0728)  SpO2: (!) 94 % (07/19/22 0728)   Vital Signs (24h Range):  Temp:  [97.6 °F (36.4 °C)-98.7 °F (37.1 °C)] 98.7 °F (37.1 °C)  Pulse:  [] 80  Resp:  [14-20] 14  SpO2:  [90 %-96 %] 94 %  BP: ()/(51-77) 134/58     Weight: 68 kg (150 lb) (07/17/22 0225)  Body mass index is 27.44 kg/m².      Intake/Output Summary (Last 24 hours) at 7/19/2022 0758  Last data filed at 7/19/2022 0311  Gross per 24 hour   Intake 730 ml   Output --   Net 730 ml       Lines/Drains/Airways       Peripheral Intravenous Line  Duration                  Peripheral IV - Single Lumen 07/17/22 0324 20 G Left Antecubital 2 days                    Physical Exam  Constitutional:       General: She is not in acute distress.     Appearance: She is well-developed. She is not diaphoretic.   HENT:      Head: Normocephalic and atraumatic.   Eyes:      Extraocular Movements: Extraocular movements intact.   Cardiovascular:      Rate and Rhythm: Normal rate and regular rhythm.   Pulmonary:      Effort: Pulmonary effort is normal. No respiratory distress.      Breath sounds: Normal breath sounds. No wheezing.   Abdominal:      General: Bowel sounds are normal. There is no distension.      Palpations: Abdomen is soft.      Tenderness: There is abdominal tenderness (mild with deep palpation) in the epigastric area. There is no guarding or rebound.   Neurological:      Mental Status: She is alert and oriented to person, place, and time.      Cranial Nerves: No cranial nerve deficit.   Psychiatric:         Behavior: Behavior normal.        Significant Labs:  CBC:   Recent Labs   Lab 07/18/22  0522   WBC 9.83   HGB 12.2   HCT 38.4        CMP:   Recent Labs   Lab 07/18/22 0522   GLU 75   CALCIUM 8.4*   ALBUMIN 2.8*   PROT 5.2*      K 3.6   CO2 21*      BUN 7*   CREATININE 0.6   ALKPHOS 398*   *   *   BILITOT 8.5*     Coagulation: No results for input(s): PT, INR, APTT in the last 48 hours.      Significant Imaging:  Imaging results within the past 24 hours have been reviewed.

## 2022-07-19 NOTE — DISCHARGE INSTRUCTIONS
Hold Plavix for 5 days   Hold simvastatin until follow up with Primary Care Provider or GI provider

## 2022-07-19 NOTE — PLAN OF CARE
Pt c/o abd pain 4/10. Pt received PRN Morphine. VSS. SCDs in place. Pt denies n/v during shift. Fall precautions in place.

## 2022-07-19 NOTE — PROGRESS NOTES
[facility]  Gastroenterology  Progress Note    Patient Name: Shanda Marrero  MRN: [id number]  Admission Date: 7/17/2022  Hospital Length of Stay: 2 days  Code Status: Prior   Attending Provider: Mercedes Del Rosario MD  Consulting Provider: Augusto Daugherty PA-C  Primary Care Physician: Jean Vasques MD  Principal Problem: Gallstone pancreatitis      Subjective:     Interval History: S/p ERCP with stone removal. The patient reports abdominal discomfort but much better than yesterday. No n/v with clear liquids. She is passing gas.    Review of Systems   Constitutional:         See Interval History for daily ROS.    Objective:     Vital Signs (Most Recent):  Temp: 98.7 °F (37.1 °C) (07/19/22 0728)  Pulse: 80 (07/19/22 0728)  Resp: 14 (07/19/22 0728)  BP: (!) 134/58 (07/19/22 0728)  SpO2: (!) 94 % (07/19/22 0728)   Vital Signs (24h Range):  Temp:  [97.6 °F (36.4 °C)-98.7 °F (37.1 °C)] 98.7 °F (37.1 °C)  Pulse:  [] 80  Resp:  [14-20] 14  SpO2:  [90 %-96 %] 94 %  BP: ()/(51-77) 134/58     Weight: 68 kg (150 lb) (07/17/22 0225)  Body mass index is 27.44 kg/m².      Intake/Output Summary (Last 24 hours) at 7/19/2022 0758  Last data filed at 7/19/2022 0311  Gross per 24 hour   Intake 730 ml   Output --   Net 730 ml       Lines/Drains/Airways       Peripheral Intravenous Line  Duration                  Peripheral IV - Single Lumen 07/17/22 0324 20 G Left Antecubital 2 days                    Physical Exam  Constitutional:       General: She is not in acute distress.     Appearance: She is well-developed. She is not diaphoretic.   HENT:      Head: Normocephalic and atraumatic.   Eyes:      Extraocular Movements: Extraocular movements intact.   Cardiovascular:      Rate and Rhythm: Normal rate and regular rhythm.   Pulmonary:      Effort: Pulmonary effort is normal. No respiratory distress.      Breath sounds: Normal breath sounds. No wheezing.   Abdominal:      General: Bowel sounds are normal. There is no  distension.      Palpations: Abdomen is soft.      Tenderness: There is abdominal tenderness (mild with deep palpation) in the epigastric area. There is no guarding or rebound.   Neurological:      Mental Status: She is alert and oriented to person, place, and time.      Cranial Nerves: No cranial nerve deficit.   Psychiatric:         Behavior: Behavior normal.       Significant Labs:  CBC:   Recent Labs   Lab 07/18/22  0522   WBC 9.83   HGB 12.2   HCT 38.4        CMP:   Recent Labs   Lab 07/18/22 0522   GLU 75   CALCIUM 8.4*   ALBUMIN 2.8*   PROT 5.2*      K 3.6   CO2 21*      BUN 7*   CREATININE 0.6   ALKPHOS 398*   *   *   BILITOT 8.5*     Coagulation: No results for input(s): PT, INR, APTT in the last 48 hours.      Significant Imaging:  Imaging results within the past 24 hours have been reviewed.    Assessment/Plan:     * Gallstone pancreatitis  2/2 to retained stone.    S/p ERCP with stone removal.   Restart IV fluids.   Continue supportive care per HM team.     Choledocholithiasis  S/p ERCP with stone removal and stent placement.   Await morning labs.   Supportive care with pain management as needed.   Repeat ERCP in two months. Dr. Altamirano will arrange.     Elevated LFTs  2/2 choledocholithiasis   Awaiting morning labs. Monitor.        Thank you for your consult. I will follow-up with patient. Please contact us if you have any additional questions.    Augusto Daugherty PA-C  Gastroenterology  [facility]

## 2022-07-19 NOTE — ASSESSMENT & PLAN NOTE
S/p ERCP with stone removal and stent placement.   Await morning labs.   Supportive care with pain management as needed.   Repeat ERCP in two months. Dr. Altamirano will arrange.

## 2022-07-20 NOTE — DISCHARGE SUMMARY
[facility] Medicine  Discharge Summary      Patient Name: Shanda Marrero  MRN: [id number]  Patient Class: IP- Inpatient  Admission Date: 7/17/2022  Hospital Length of Stay: 2 days  Discharge Date and Time: 7/19/2022  3:47 PM  Attending Physician: Mercedes Del Rosario MD   Discharging Provider: Dru Jaramillo NP  Primary Care Provider: Jean Vasques MD      HPI:   Ms Marrero is a 71 year old female with PMHx of CAD, HTN, cholecystectomy,  paroxymal A Flutter and HLD who presented to [facility] for evaluation of abdominal pain that started about 2 days ago. Associated symptoms include one episode of nausea and vomiting. Denies associated symptoms of fever, chills, chest pain, shortness of breath or diaphoresis. CT of the abdomin/pelvis showed peripancreatic inflammatory changes most consistent with pancreatitis, marked dilation of the distal common bile duct measuring up to 2.3 cm is suggested with a 9 mm radiodensity concerning for choledocholithiasis, intrahepatic and extrahepatic ductal dilation noted as well.  Lipase > 1000, total bilirubin 8.2 with elevated LFT as well. GI consulted, planed ERCP for today. Patient is a full code, son is surrogate decision maker. She is being admitted under the care of [facility] Medicine.       Procedure(s) (LRB):  ERCP (ENDOSCOPIC RETROGRADE CHOLANGIOPANCREATOGRAPHY) (N/A)      Hospital Course:   Patient is a 71 year old female who presented to [facility] for evaluation of abdominal pain. Diagnosis with gallstone pancreatitis, she underwent ERCP this AM with a 1 cm distal bile duct stone was retrieved. Case reviewed with Dr Altamirano, will continue treatment of pancreatitis. Start clear liquid diet, advance as tolerated. Lipase > 1000 and LFT's elevated this AM.  A of 7/19/2022, patient symptoms improving, tolerating diet. Lipace down to 58. Case reviewed with GI, ok to discharge home, hold Plavix for 5 days. Hold statin until follow up with PCP or GI due to elevated LFT's.  Patient is eating drinking well, walking around well. She was seen and examined and deemed stable for discharge home today.       Goals of Care Treatment Preferences:  Code Status: Full Code      Consults:   Consults (From admission, onward)          Status Ordering Provider     Inpatient consult to Gastroenterology  Once        Provider:  (Not yet assigned)    Completed JOLENE LERMA            No new Assessment & Plan notes have been filed under this hospital service since the last note was generated.  Service: Hospital Medicine    Final Active Diagnoses:    Diagnosis Date Noted POA    PRINCIPAL PROBLEM:  Gallstone pancreatitis [K85.10] 07/17/2022 Yes    Choledocholithiasis [K80.50] 07/17/2022 Yes    Elevated LFTs [R79.89] 07/17/2022 Yes    Coronary artery disease, occlusive [I25.10] 09/19/2014 Yes    Dyslipidemia [E78.5] 08/14/2013 Yes     Chronic    Atrial flutter, paroxysmal [I48.92] 08/14/2013 Yes      Problems Resolved During this Admission:       Discharged Condition: stable    Disposition: Home or Self Care    Follow Up:   Follow-up Information       Jean Vasques MD Follow up on 7/22/2022.    Specialty: Family Medicine  Why: Scheduled follow up  Contact information:  [address] [place] [address] [place] [address] [phone]               Nohelia Altamirano MD Follow up in 2 week(s).    Specialty: Gastroenterology  Why: hospital follow up post procedure  Contact information:  [address] [place] [address] [address] [place]A [address] [phone]                           Patient Instructions:      Ambulatory referral/consult to [facility]   Standing Status: Future   Referral Priority: Routine Referral Type: Consultation   Referral Reason: Specialty Services Required   Number of Visits Requested: 1     Diet full liquid   Order Comments: Advance diet as tolerated     Notify your health care provider if you experience any of the following:  severe uncontrolled pain     Notify your health care  provider if you experience any of the following:  difficulty breathing or increased cough     Activity as tolerated       Significant Diagnostic Studies: Labs:   CMP   Recent Labs   Lab 07/19/22  0654      K 3.5      CO2 25   GLU 78   BUN 4*   CREATININE 0.6   CALCIUM 8.3*   PROT 5.1*   ALBUMIN 2.6*   BILITOT 7.0*   ALKPHOS 389*   *   *   ANIONGAP 11   ESTGFRAFRICA >60   EGFRNONAA >60    and CBC     Pending Diagnostic Studies:       None           Medications:  Reconciled Home Medications:      Medication List        CONTINUE taking these medications      CARAFATE 1 gram tablet  Generic drug: sucralfate  Take 1 g by mouth 4 (four) times daily.     clopidogreL 75 mg tablet  Commonly known as: PLAVIX  TAKE 1 TABLET EVERY DAY  Notes to patient: Hold for five day      metoprolol succinate 25 MG 24 hr tablet  Commonly known as: TOPROL-XL  TAKE 1 TABLET EVERY DAY     omeprazole 40 MG capsule  Commonly known as: PRILOSEC  TAKE 1 CAPSULE EVERY DAY     pantoprazole 40 MG tablet  Commonly known as: PROTONIX  Take 40 mg by mouth once daily.     simvastatin 40 MG tablet  Commonly known as: ZOCOR  TAKE 1 TABLET EVERY EVENING.  Notes to patient: Hold until follow up with PCP or GI provider               Indwelling Lines/Drains at time of discharge:   Lines/Drains/Airways       None                   Time spent on the discharge of patient: 45 minutes         Dru Jaramillo NP  Department of Hospital Medicine  '[facility]

## 2022-07-21 ENCOUNTER — TELEPHONE (OUTPATIENT)
Dept: ADMINISTRATIVE | Facility: CLINIC | Age: 72
End: 2022-07-21
Payer: MEDICARE

## 2022-07-21 ENCOUNTER — PATIENT OUTREACH (OUTPATIENT)
Dept: ADMINISTRATIVE | Facility: HOSPITAL | Age: 72
End: 2022-07-21
Payer: MEDICARE

## 2022-07-21 NOTE — PROGRESS NOTES
Spoke to patient. She said she had no questions or concerns. Stated that she was not discharged on any medications; instructed to take OTC Tylenol or ibuprofen for any pain. She also stated that she has been on a soft diet and wondered how much longer she should continue. I instructed patient to continue same diet and address dietary changes with PCP at tomorrow's appt. Patient was offered OOC number but said she had it in her discharge paperwork. Patient verbalized understanding.

## 2022-07-22 ENCOUNTER — OFFICE VISIT (OUTPATIENT)
Dept: FAMILY MEDICINE | Facility: CLINIC | Age: 72
End: 2022-07-22
Payer: MEDICARE

## 2022-07-22 ENCOUNTER — HOSPITAL ENCOUNTER (OUTPATIENT)
Dept: RADIOLOGY | Facility: HOSPITAL | Age: 72
Discharge: HOME | End: 2022-07-22
Attending: FAMILY MEDICINE
Payer: MEDICARE

## 2022-07-22 VITALS
BODY MASS INDEX: 26.57 KG/M2 | TEMPERATURE: 98 F | WEIGHT: 144.38 LBS | OXYGEN SATURATION: 98 % | HEART RATE: 84 BPM | SYSTOLIC BLOOD PRESSURE: 124 MMHG | DIASTOLIC BLOOD PRESSURE: 74 MMHG | HEIGHT: 62 IN

## 2022-07-22 DIAGNOSIS — E78.5 DYSLIPIDEMIA: ICD-10-CM

## 2022-07-22 DIAGNOSIS — Z12.11 SCREENING FOR COLON CANCER: ICD-10-CM

## 2022-07-22 DIAGNOSIS — R79.89 ELEVATED LFTS: ICD-10-CM

## 2022-07-22 DIAGNOSIS — Z12.39 ENCOUNTER FOR SCREENING FOR MALIGNANT NEOPLASM OF BREAST, UNSPECIFIED SCREENING MODALITY: ICD-10-CM

## 2022-07-22 DIAGNOSIS — I20.9 ANGINA, CLASS II: ICD-10-CM

## 2022-07-22 DIAGNOSIS — M79.604 PAIN OF RIGHT LOWER EXTREMITY: ICD-10-CM

## 2022-07-22 DIAGNOSIS — R22.41 LEG MASS, RIGHT: ICD-10-CM

## 2022-07-22 DIAGNOSIS — K85.10 GALLSTONE PANCREATITIS: Primary | ICD-10-CM

## 2022-07-22 DIAGNOSIS — K80.50 CHOLEDOCHOLITHIASIS: ICD-10-CM

## 2022-07-22 DIAGNOSIS — I25.10 CORONARY ARTERY DISEASE, OCCLUSIVE: ICD-10-CM

## 2022-07-22 DIAGNOSIS — Z23 NEED FOR VACCINATION: ICD-10-CM

## 2022-07-22 PROCEDURE — 90677 PCV20 VACCINE IM: CPT | Mod: S$GLB,,, | Performed by: FAMILY MEDICINE

## 2022-07-22 PROCEDURE — 3078F DIAST BP <80 MM HG: CPT | Mod: CPTII,S$GLB,, | Performed by: FAMILY MEDICINE

## 2022-07-22 PROCEDURE — 3074F SYST BP LT 130 MM HG: CPT | Mod: CPTII,S$GLB,, | Performed by: FAMILY MEDICINE

## 2022-07-22 PROCEDURE — 76882 US LMTD JT/FCL EVL NVASC XTR: CPT | Mod: 26,RT,, | Performed by: RADIOLOGY

## 2022-07-22 PROCEDURE — 1126F AMNT PAIN NOTED NONE PRSNT: CPT | Mod: CPTII,S$GLB,, | Performed by: FAMILY MEDICINE

## 2022-07-22 PROCEDURE — 76882 US SOFT TISSUE, LOWER EXTREMITY, RIGHT: ICD-10-PCS | Mod: 26,RT,, | Performed by: RADIOLOGY

## 2022-07-22 PROCEDURE — 90677 PNEUMOCOCCAL CONJUGATE VACCINE 20-VALENT: ICD-10-PCS | Mod: S$GLB,,, | Performed by: FAMILY MEDICINE

## 2022-07-22 PROCEDURE — 1159F MED LIST DOCD IN RCRD: CPT | Mod: CPTII,S$GLB,, | Performed by: FAMILY MEDICINE

## 2022-07-22 PROCEDURE — 3008F PR BODY MASS INDEX (BMI) DOCUMENTED: ICD-10-PCS | Mod: CPTII,S$GLB,, | Performed by: FAMILY MEDICINE

## 2022-07-22 PROCEDURE — 99214 PR OFFICE/OUTPT VISIT, EST, LEVL IV, 30-39 MIN: ICD-10-PCS | Mod: S$GLB,,, | Performed by: FAMILY MEDICINE

## 2022-07-22 PROCEDURE — 3008F BODY MASS INDEX DOCD: CPT | Mod: CPTII,S$GLB,, | Performed by: FAMILY MEDICINE

## 2022-07-22 PROCEDURE — 1101F PR PT FALLS ASSESS DOC 0-1 FALLS W/OUT INJ PAST YR: ICD-10-PCS | Mod: CPTII,S$GLB,, | Performed by: FAMILY MEDICINE

## 2022-07-22 PROCEDURE — 3288F FALL RISK ASSESSMENT DOCD: CPT | Mod: CPTII,S$GLB,, | Performed by: FAMILY MEDICINE

## 2022-07-22 PROCEDURE — 93970 US LOWER EXTREMITY VEINS BILATERAL: ICD-10-PCS | Mod: 26,,, | Performed by: RADIOLOGY

## 2022-07-22 PROCEDURE — 1101F PT FALLS ASSESS-DOCD LE1/YR: CPT | Mod: CPTII,S$GLB,, | Performed by: FAMILY MEDICINE

## 2022-07-22 PROCEDURE — 1159F PR MEDICATION LIST DOCUMENTED IN MEDICAL RECORD: ICD-10-PCS | Mod: CPTII,S$GLB,, | Performed by: FAMILY MEDICINE

## 2022-07-22 PROCEDURE — 76882 US LMTD JT/FCL EVL NVASC XTR: CPT | Mod: TC,RT

## 2022-07-22 PROCEDURE — G0009 ADMIN PNEUMOCOCCAL VACCINE: HCPCS | Mod: S$GLB,,, | Performed by: FAMILY MEDICINE

## 2022-07-22 PROCEDURE — 93970 EXTREMITY STUDY: CPT | Mod: TC

## 2022-07-22 PROCEDURE — 3074F PR MOST RECENT SYSTOLIC BLOOD PRESSURE < 130 MM HG: ICD-10-PCS | Mod: CPTII,S$GLB,, | Performed by: FAMILY MEDICINE

## 2022-07-22 PROCEDURE — 3288F PR FALLS RISK ASSESSMENT DOCUMENTED: ICD-10-PCS | Mod: CPTII,S$GLB,, | Performed by: FAMILY MEDICINE

## 2022-07-22 PROCEDURE — 99999 PR PBB SHADOW E&M-EST. PATIENT-LVL IV: CPT | Mod: PBBFAC,,, | Performed by: FAMILY MEDICINE

## 2022-07-22 PROCEDURE — 99214 OFFICE O/P EST MOD 30 MIN: CPT | Mod: S$GLB,,, | Performed by: FAMILY MEDICINE

## 2022-07-22 PROCEDURE — 1111F PR DISCHARGE MEDS RECONCILED W/ CURRENT OUTPATIENT MED LIST: ICD-10-PCS | Mod: CPTII,S$GLB,, | Performed by: FAMILY MEDICINE

## 2022-07-22 PROCEDURE — 93970 EXTREMITY STUDY: CPT | Mod: 26,,, | Performed by: RADIOLOGY

## 2022-07-22 PROCEDURE — 3078F PR MOST RECENT DIASTOLIC BLOOD PRESSURE < 80 MM HG: ICD-10-PCS | Mod: CPTII,S$GLB,, | Performed by: FAMILY MEDICINE

## 2022-07-22 PROCEDURE — 1111F DSCHRG MED/CURRENT MED MERGE: CPT | Mod: CPTII,S$GLB,, | Performed by: FAMILY MEDICINE

## 2022-07-22 PROCEDURE — 99999 PR PBB SHADOW E&M-EST. PATIENT-LVL IV: ICD-10-PCS | Mod: PBBFAC,,, | Performed by: FAMILY MEDICINE

## 2022-07-22 PROCEDURE — G0009 PNEUMOCOCCAL CONJUGATE VACCINE 20-VALENT: ICD-10-PCS | Mod: S$GLB,,, | Performed by: FAMILY MEDICINE

## 2022-07-22 PROCEDURE — 1126F PR PAIN SEVERITY QUANTIFIED, NO PAIN PRESENT: ICD-10-PCS | Mod: CPTII,S$GLB,, | Performed by: FAMILY MEDICINE

## 2022-07-22 RX ORDER — REGADENOSON 0.08 MG/ML
INJECTION, SOLUTION INTRAVENOUS
COMMUNITY
Start: 2022-06-13 | End: 2022-10-03

## 2022-07-22 NOTE — PROGRESS NOTES
"Subjective:       Patient ID: Shanda Marrero is a 71 y.o. female.    Chief Complaint: Hospital Follow Up      HPI Comments:       Current Outpatient Medications:     clopidogreL (PLAVIX) 75 mg tablet, TAKE 1 TABLET EVERY DAY, Disp: 90 tablet, Rfl: 3    LEXISCAN 0.4 mg/5 mL Syrg, , Disp: , Rfl:     metoprolol succinate (TOPROL-XL) 25 MG 24 hr tablet, TAKE 1 TABLET EVERY DAY, Disp: 90 tablet, Rfl: 3    omeprazole (PRILOSEC) 40 MG capsule, TAKE 1 CAPSULE EVERY DAY, Disp: 90 capsule, Rfl: 3    simvastatin (ZOCOR) 40 MG tablet, TAKE 1 TABLET EVERY EVENING., Disp: 90 tablet, Rfl: 3    CARAFATE 1 gram tablet, Take 1 g by mouth 4 (four) times daily., Disp: , Rfl:     pantoprazole (PROTONIX) 40 MG tablet, Take 40 mg by mouth once daily., Disp: , Rfl:       Hospital follow-up.  Gall stone pancreatitis.  Gallstone was treated by ERCP.  Had cholecystectomy in 2021.    Eating soft diet.  Had some heartburn with garlic-potatoes.  Normal bowel movements.    Small lump in her right thigh near the knee.  Was not there before.  No swelling in the legs overall.    Has restarted her Plavix as instructed.  Restarted her statin prematurely.    Overdue for mammogram.    Had a fit kit that was negative in late 2021.      Review of Systems   Constitutional: Negative for activity change, appetite change and fever.   HENT: Negative for sore throat.    Respiratory: Negative for cough and shortness of breath.    Cardiovascular: Negative for chest pain.   Gastrointestinal: Negative for abdominal pain, diarrhea and nausea.   Genitourinary: Negative for difficulty urinating.   Musculoskeletal: Negative for arthralgias and myalgias.   Neurological: Negative for dizziness and headaches.       Objective:      Vitals:    07/22/22 1116   BP: 124/74   Pulse: 84   Temp: 97.6 °F (36.4 °C)   TempSrc: Temporal   SpO2: 98%   Weight: 65.5 kg (144 lb 6.4 oz)   Height: 5' 2" (1.575 m)   PainSc: 0-No pain     Physical Exam  Vitals and nursing note " reviewed.   Constitutional:       General: She is not in acute distress.     Appearance: She is well-developed. She is not diaphoretic.   HENT:      Head: Normocephalic.   Neck:      Thyroid: No thyromegaly.   Cardiovascular:      Rate and Rhythm: Normal rate and regular rhythm.      Heart sounds: Normal heart sounds. No murmur heard.  Pulmonary:      Effort: Pulmonary effort is normal.      Breath sounds: Normal breath sounds. No wheezing or rales.   Abdominal:      General: There is no distension.      Palpations: Abdomen is soft.      Tenderness: There is no abdominal tenderness.   Musculoskeletal:      Cervical back: Neck supple.        Legs:    Lymphadenopathy:      Cervical: No cervical adenopathy.   Skin:     General: Skin is warm and dry.   Neurological:      Mental Status: She is alert and oriented to person, place, and time.   Psychiatric:         Mood and Affect: Mood normal.         Behavior: Behavior normal.         Thought Content: Thought content normal.         Judgment: Judgment normal.         Assessment:       1. Gallstone pancreatitis    2. Choledocholithiasis    3. Elevated LFTs    4. Coronary artery disease, occlusive    5. Dyslipidemia    6.  leg mass, right    7. Encounter for screening for malignant neoplasm of breast, unspecified screening modality    8. Screening for colon cancer    9. Need for vaccination    10. Angina, class II        Plan:   Gallstone pancreatitis  Comments:  Resuming regular diet.  No increase in abdominal pain.  Lipase returned to normal prior to discharge    Choledocholithiasis  Comments:  stone retrieved by ERCP. Prev choly. 2021    Elevated LFTs  Comments:  CMP today  Orders:  -     Comprehensive Metabolic Panel; Future; Expected date: 07/22/2022    Coronary artery disease, occlusive  Comments:  On Plavix and statin    Dyslipidemia  Comments:  Patient has already restarted her statin.  Check liver enzymes today     leg mass, right  Comments:  Rule out  DVT  Orders:  -     US Lower Extremity Veins Bilateral; Future; Expected date: 07/22/2022    Encounter for screening for malignant neoplasm of breast, unspecified screening modality  Comments:  will schedule MG next few weeks    Screening for colon cancer  Comments:  neg fit kit 11/21    Need for vaccination  Comments:  prevnar 20  Orders:  -     (In Office Administered) Pneumococcal Conjugate Vaccine (20 Valent) (IM)    Angina, class II  Comments:  no recent symptoms

## 2022-07-26 DIAGNOSIS — R79.89 ELEVATED LFTS: Primary | ICD-10-CM

## 2022-07-28 ENCOUNTER — TELEPHONE (OUTPATIENT)
Dept: ADMINISTRATIVE | Facility: CLINIC | Age: 72
End: 2022-07-28
Payer: MEDICARE

## 2022-07-28 NOTE — TELEPHONE ENCOUNTER
Patient contacted tracker requesting a hosp follow up post procedure for stents removal. Please contact patient for concerns.      Thanks

## 2022-07-29 ENCOUNTER — PATIENT MESSAGE (OUTPATIENT)
Dept: GASTROENTEROLOGY | Facility: CLINIC | Age: 72
End: 2022-07-29
Payer: MEDICARE

## 2022-07-31 ENCOUNTER — PATIENT MESSAGE (OUTPATIENT)
Dept: GASTROENTEROLOGY | Facility: CLINIC | Age: 72
End: 2022-07-31
Payer: MEDICARE

## 2022-08-02 DIAGNOSIS — K80.50 CHOLEDOCHOLITHIASIS: Primary | ICD-10-CM

## 2022-08-02 DIAGNOSIS — Z46.89 ENCOUNTER FOR REMOVAL OF BILIARY STENT: ICD-10-CM

## 2022-08-09 ENCOUNTER — PATIENT MESSAGE (OUTPATIENT)
Dept: ADMINISTRATIVE | Facility: HOSPITAL | Age: 72
End: 2022-08-09
Payer: MEDICARE

## 2022-08-09 ENCOUNTER — LAB VISIT (OUTPATIENT)
Dept: LAB | Facility: HOSPITAL | Age: 72
End: 2022-08-09
Attending: FAMILY MEDICINE
Payer: MEDICARE

## 2022-08-09 DIAGNOSIS — R79.89 ELEVATED LFTS: ICD-10-CM

## 2022-08-09 LAB
ALBUMIN SERPL BCP-MCNC: 3.8 G/DL (ref 3.5–5.2)
ALP SERPL-CCNC: 133 U/L (ref 55–135)
ALT SERPL W/O P-5'-P-CCNC: 15 U/L (ref 10–44)
ANION GAP SERPL CALC-SCNC: 14 MMOL/L (ref 8–16)
AST SERPL-CCNC: 24 U/L (ref 10–40)
BILIRUB SERPL-MCNC: 1.1 MG/DL (ref 0.1–1)
BUN SERPL-MCNC: 10 MG/DL (ref 8–23)
CALCIUM SERPL-MCNC: 9.7 MG/DL (ref 8.7–10.5)
CHLORIDE SERPL-SCNC: 104 MMOL/L (ref 95–110)
CO2 SERPL-SCNC: 24 MMOL/L (ref 23–29)
CREAT SERPL-MCNC: 0.9 MG/DL (ref 0.5–1.4)
EST. GFR  (NO RACE VARIABLE): >60 ML/MIN/1.73 M^2
GLUCOSE SERPL-MCNC: 93 MG/DL (ref 70–110)
POTASSIUM SERPL-SCNC: 4.4 MMOL/L (ref 3.5–5.1)
PROT SERPL-MCNC: 7 G/DL (ref 6–8.4)
SODIUM SERPL-SCNC: 142 MMOL/L (ref 136–145)

## 2022-08-09 PROCEDURE — 80053 COMPREHEN METABOLIC PANEL: CPT | Performed by: FAMILY MEDICINE

## 2022-08-09 PROCEDURE — 36415 COLL VENOUS BLD VENIPUNCTURE: CPT | Mod: PO | Performed by: FAMILY MEDICINE

## 2022-08-17 ENCOUNTER — PATIENT OUTREACH (OUTPATIENT)
Dept: ADMINISTRATIVE | Facility: HOSPITAL | Age: 72
End: 2022-08-17
Payer: MEDICARE

## 2022-08-17 NOTE — PROGRESS NOTES
Working Human Statin Report.    Per med card pt is on simvastatin 40 mg.  Snap shot-med dispense history indicates last filled Nov 2021.    Spoke with Ms Marrero regarding statin medication.  She states she is taking it and just had too much medicine from [employer], so did not need to refill it.

## 2022-09-02 ENCOUNTER — PATIENT OUTREACH (OUTPATIENT)
Dept: ADMINISTRATIVE | Facility: HOSPITAL | Age: 72
End: 2022-09-02
Payer: MEDICARE

## 2022-09-02 NOTE — PROGRESS NOTES
Working the Non- Complaint Mammogram Report: Called patient to discuss scheduling a mammogram Left a message requesting a call back

## 2022-09-14 ENCOUNTER — TELEPHONE (OUTPATIENT)
Dept: CARDIOLOGY | Facility: CLINIC | Age: 72
End: 2022-09-14
Payer: MEDICARE

## 2022-09-14 NOTE — TELEPHONE ENCOUNTER
Lvm for pt to return call to clinic in regards to pre op clearance for upcoming procedure. Informed pt that she is schedule for 9/19 at the [facility] with Dr. Marcus.       ----- Message from Ashley Antoine sent at 9/14/2022  3:00 PM CDT -----  Contact: [phone]  Patient called, is having a procedure on 09/20 - stint removal - and they are needing for patient to stop Plavix 4 days before surgery. Please leave a message into [employer], or call. Please call and advise. Thank you

## 2022-09-14 NOTE — PROGRESS NOTES
Procedure instruction reviewed with pt. Place, time, nothing to eat or drink after midnight Monday 9/19/22, no chewing gum or sucking on hard candy, take BP medication with sip of water on am of procedure, have someone to drive them home.  Pt instructed to stop taking Plavix 5 days prior to procedure.  Pt verbalized understanding.

## 2022-09-15 ENCOUNTER — E-CONSULT (OUTPATIENT)
Dept: CARDIOLOGY | Facility: CLINIC | Age: 72
End: 2022-09-15
Payer: MEDICARE

## 2022-09-15 ENCOUNTER — TELEPHONE (OUTPATIENT)
Dept: GASTROENTEROLOGY | Facility: CLINIC | Age: 72
End: 2022-09-15

## 2022-09-15 DIAGNOSIS — Z98.61 HISTORY OF CORONARY ANGIOPLASTY: Primary | Chronic | ICD-10-CM

## 2022-09-15 DIAGNOSIS — Z01.810 PREOP CARDIOVASCULAR EXAM: ICD-10-CM

## 2022-09-15 DIAGNOSIS — I25.10 CORONARY ARTERY DISEASE, OCCLUSIVE: ICD-10-CM

## 2022-09-15 PROCEDURE — 99451 PR INTERPROF, PHONE/INTERNET/EHR, CONSULT, >= 5MINS: ICD-10-PCS | Mod: S$GLB,,, | Performed by: INTERNAL MEDICINE

## 2022-09-15 PROCEDURE — 99451 NTRPROF PH1/NTRNET/EHR 5/>: CPT | Mod: S$GLB,,, | Performed by: INTERNAL MEDICINE

## 2022-09-15 NOTE — TELEPHONE ENCOUNTER
Returned call and discussed patient's procedure.  Patient takes Plavix and has called the cardiologist to ask for permission to stop the Plavix.  She is waiting to hear back.  Message sent to Mary Kay as today would be the last day Plavix could be taken before the procedure.

## 2022-09-15 NOTE — TELEPHONE ENCOUNTER
----- Message from Ananya Michelle LPN sent at 9/14/2022  3:17 PM CDT -----  Contact: Shanda Btaista advise. thanks  ----- Message -----  From: Alicia Loera  Sent: 9/14/2022   9:04 AM CDT  To: [facility]    Patient is calling in regarding to her scheduled procedure on 9/20. Please give her a call to further discuss concerns at [phone]       Thanks 'JW

## 2022-09-15 NOTE — TELEPHONE ENCOUNTER
----- Message from Ananya Michelle LPN sent at 9/14/2022  3:17 PM CDT -----  Contact: Shanda Batista advise. thanks  ----- Message -----  From: Alicia Loera  Sent: 9/14/2022   9:04 AM CDT  To: [facility]    Patient is calling in regarding to her scheduled procedure on 9/20. Please give her a call to further discuss concerns at [phone]       Thanks 'JW

## 2022-09-15 NOTE — PROGRESS NOTES
[facility]  Response for E-Consult     Patient Name: Shanda Marrero  MRN: [id number]  Primary Care Provider: Jean Vasques MD   Requesting Provider: Mary Kay Odell PA-C      Findings: 70 yo female E consult for preop clearance of ERCP  The chart reviewed and f/u with dr. Marcus cardiologist at [facility] CAD s/p stent in RCA in 2009.   MPI showed no ischemia,    ECHO EF 50% mild AS   EKG NSR     Plan  Elevated periop risk of CV events for non-high risk procedure.  Ok to proceed the scheduled surgery without further cardiac study.  OK to hold Plavix 7 days before the procedure and resume ASAP postop.      I did not speak to the requesting provider verbally about this.     Total time of Consultation: 21 minute    Percentage of time spent on verbal/written discussion: 50%     Thank you for your consult.     Gideon Reaves MD  [facility]

## 2022-09-16 ENCOUNTER — PATIENT MESSAGE (OUTPATIENT)
Dept: CARDIOLOGY | Facility: CLINIC | Age: 72
End: 2022-09-16
Payer: MEDICARE

## 2022-09-20 ENCOUNTER — ANESTHESIA (OUTPATIENT)
Dept: ENDOSCOPY | Facility: HOSPITAL | Age: 72
End: 2022-09-20
Payer: MEDICARE

## 2022-09-20 ENCOUNTER — ANESTHESIA EVENT (OUTPATIENT)
Dept: ENDOSCOPY | Facility: HOSPITAL | Age: 72
End: 2022-09-20
Payer: MEDICARE

## 2022-09-20 ENCOUNTER — HOSPITAL ENCOUNTER (OUTPATIENT)
Facility: HOSPITAL | Age: 72
Discharge: HOME | End: 2022-09-20
Attending: INTERNAL MEDICINE | Admitting: INTERNAL MEDICINE
Payer: MEDICARE

## 2022-09-20 DIAGNOSIS — K80.50 CHOLEDOCHOLITHIASIS: Primary | ICD-10-CM

## 2022-09-20 PROCEDURE — 25000003 PHARM REV CODE 250: Performed by: NURSE ANESTHETIST, CERTIFIED REGISTERED

## 2022-09-20 PROCEDURE — 74328 PR  X-RAY FOR BILE DUCT ENDOSCOPY: ICD-10-PCS | Mod: 26,,, | Performed by: INTERNAL MEDICINE

## 2022-09-20 PROCEDURE — 37000009 HC ANESTHESIA EA ADD 15 MINS: Performed by: INTERNAL MEDICINE

## 2022-09-20 PROCEDURE — 43275 ERCP REMOVE FORGN BODY DUCT: CPT | Performed by: INTERNAL MEDICINE

## 2022-09-20 PROCEDURE — 25500020 PHARM REV CODE 255: Performed by: INTERNAL MEDICINE

## 2022-09-20 PROCEDURE — 27201089 HC SNARE, DISP (ANY): Performed by: INTERNAL MEDICINE

## 2022-09-20 PROCEDURE — 43264 ERCP REMOVE DUCT CALCULI: CPT | Performed by: INTERNAL MEDICINE

## 2022-09-20 PROCEDURE — 43275 ERCP REMOVE FORGN BODY DUCT: CPT | Mod: ,,, | Performed by: INTERNAL MEDICINE

## 2022-09-20 PROCEDURE — 37000008 HC ANESTHESIA 1ST 15 MINUTES: Performed by: INTERNAL MEDICINE

## 2022-09-20 PROCEDURE — 74328 X-RAY BILE DUCT ENDOSCOPY: CPT | Mod: 26,,, | Performed by: INTERNAL MEDICINE

## 2022-09-20 PROCEDURE — 63600175 PHARM REV CODE 636 W HCPCS: Performed by: NURSE ANESTHETIST, CERTIFIED REGISTERED

## 2022-09-20 PROCEDURE — 27202125 HC BALLOON, EXTRACTION (ANY): Performed by: INTERNAL MEDICINE

## 2022-09-20 PROCEDURE — 74328 X-RAY BILE DUCT ENDOSCOPY: CPT | Mod: TC | Performed by: INTERNAL MEDICINE

## 2022-09-20 PROCEDURE — 43264 PR ERCP,W/REMOVAL STONE,BIL/PANCR DUCTS: ICD-10-PCS | Mod: 51,,, | Performed by: INTERNAL MEDICINE

## 2022-09-20 PROCEDURE — 43264 ERCP REMOVE DUCT CALCULI: CPT | Mod: 51,,, | Performed by: INTERNAL MEDICINE

## 2022-09-20 PROCEDURE — C1769 GUIDE WIRE: HCPCS | Performed by: INTERNAL MEDICINE

## 2022-09-20 PROCEDURE — 43275 PR ERCP W/REMOVAL FOREIGN BODY/STENT FROM BILIARY/PANCREATIC DUCT: ICD-10-PCS | Mod: ,,, | Performed by: INTERNAL MEDICINE

## 2022-09-20 RX ORDER — ROCURONIUM BROMIDE 10 MG/ML
INJECTION, SOLUTION INTRAVENOUS
Status: DISCONTINUED | OUTPATIENT
Start: 2022-09-20 | End: 2022-09-20

## 2022-09-20 RX ORDER — LIDOCAINE HYDROCHLORIDE 10 MG/ML
INJECTION, SOLUTION EPIDURAL; INFILTRATION; INTRACAUDAL; PERINEURAL
Status: DISCONTINUED | OUTPATIENT
Start: 2022-09-20 | End: 2022-09-20

## 2022-09-20 RX ORDER — ONDANSETRON 2 MG/ML
INJECTION INTRAMUSCULAR; INTRAVENOUS
Status: DISCONTINUED | OUTPATIENT
Start: 2022-09-20 | End: 2022-09-20

## 2022-09-20 RX ORDER — PROPOFOL 10 MG/ML
VIAL (ML) INTRAVENOUS
Status: DISCONTINUED | OUTPATIENT
Start: 2022-09-20 | End: 2022-09-20

## 2022-09-20 RX ORDER — SODIUM CHLORIDE, SODIUM LACTATE, POTASSIUM CHLORIDE, CALCIUM CHLORIDE 600; 310; 30; 20 MG/100ML; MG/100ML; MG/100ML; MG/100ML
INJECTION, SOLUTION INTRAVENOUS CONTINUOUS
Status: DISCONTINUED | OUTPATIENT
Start: 2022-09-20 | End: 2022-09-20 | Stop reason: HOSPADM

## 2022-09-20 RX ORDER — SODIUM CHLORIDE, SODIUM LACTATE, POTASSIUM CHLORIDE, CALCIUM CHLORIDE 600; 310; 30; 20 MG/100ML; MG/100ML; MG/100ML; MG/100ML
INJECTION, SOLUTION INTRAVENOUS CONTINUOUS PRN
Status: DISCONTINUED | OUTPATIENT
Start: 2022-09-20 | End: 2022-09-20

## 2022-09-20 RX ORDER — SUCCINYLCHOLINE CHLORIDE 20 MG/ML
INJECTION INTRAMUSCULAR; INTRAVENOUS
Status: DISCONTINUED | OUTPATIENT
Start: 2022-09-20 | End: 2022-09-20

## 2022-09-20 RX ADMIN — ROCURONIUM BROMIDE 5 MG: 10 INJECTION, SOLUTION INTRAVENOUS at 10:09

## 2022-09-20 RX ADMIN — SUCCINYLCHOLINE CHLORIDE 120 MG: 20 INJECTION, SOLUTION INTRAMUSCULAR; INTRAVENOUS at 10:09

## 2022-09-20 RX ADMIN — LIDOCAINE HYDROCHLORIDE 50 MG: 10 INJECTION, SOLUTION EPIDURAL; INFILTRATION; INTRACAUDAL; PERINEURAL at 10:09

## 2022-09-20 RX ADMIN — PROPOFOL 100 MG: 10 INJECTION, EMULSION INTRAVENOUS at 10:09

## 2022-09-20 RX ADMIN — ONDANSETRON 4 MG: 2 INJECTION, SOLUTION INTRAMUSCULAR; INTRAVENOUS at 10:09

## 2022-09-20 RX ADMIN — SODIUM CHLORIDE, SODIUM LACTATE, POTASSIUM CHLORIDE, AND CALCIUM CHLORIDE: .6; .31; .03; .02 INJECTION, SOLUTION INTRAVENOUS at 09:09

## 2022-09-20 NOTE — ANESTHESIA POSTPROCEDURE EVALUATION
Anesthesia Post Evaluation    Patient: Shanda Marrero    Procedure(s) Performed: Procedure(s) (LRB):  ERCP (ENDOSCOPIC RETROGRADE CHOLANGIOPANCREATOGRAPHY) (N/A)    Final Anesthesia Type: general      Patient location during evaluation: GI PACU  Patient participation: Yes- Able to Participate  Level of consciousness: awake and alert  Post-procedure vital signs: reviewed and stable  Pain management: adequate  Airway patency: patent  VIANEY mitigation strategies: Postoperative administration of CPAP, nasopharyngeal airway, or oral appliance in the postanesthesia care unit (PACU) and Extubation while patient is awake  PONV status at discharge: No PONV  Anesthetic complications: no      Cardiovascular status: hemodynamically stable and stable  Respiratory status: unassisted, spontaneous ventilation and room air  Hydration status: euvolemic  Follow-up not needed.          Vitals Value Taken Time   /74 09/20/22 1044   Temp 36.5 °C (97.7 °F) 09/20/22 1024   Pulse 68 09/20/22 1044   Resp 17 09/20/22 1044   SpO2 99 % 09/20/22 1044         Event Time   Out of Recovery 10:53:15         Pain/Gela Score: Gela Score: 10 (9/20/2022 10:44 AM)

## 2022-09-20 NOTE — H&P
PRE PROCEDURE H&P    Patient Name: Shanda Marrero  MRN: [id number]  : 1950  Date of Procedure:  2022  Referring Physician: Mary Kay Odell PA-C  Primary Physician: Jean Vasques MD  Procedure Physician: Nohelia Altamirano MD       Planned Procedure: ERCP  Diagnosis: Stent retrieval and final biliary clearance  Chief Complaint: Same as above    HPI: Patient is an 71 y.o. female is here for the above.     ERCP is currently recommended. The risks, benefits and alternatives of the procedure were discussed with the patient in detail. Benefits include removal of stones, stents, debri, sludge; dilation; placement of a stent; biopsies. Risks include bleeding (0.3-2%), pancreatitis (3%), cholangitis (0.5-3%), perforation (0.08-0.6%), cholecystitis (0.5%), sedation related adverse events. Educational material of the biliary anatomy has been provided today for educational purposes. Other risks include, risks of adverse reaction to sedation requiring the use of reversal agents, bleeding requiring blood transfusion, perforation requiring surgical intervention, technical failure, aspiration leading to respiratory distress and respiratory failure resulting in endotracheal intubation and mechanical ventilation including death. Anesthesia is utilized for this procedure, it is up to the anesthesiologist to determine airway safety including elective endotracheal intubation. Questions were answered, the paitent agree to proceed. There was no language barriers.       Past Medical History:   Past Medical History:   Diagnosis Date    *Atrial flutter     PAROXYSMAL    Acute coronary syndrome     OLD INF MI    Coronary artery disease     Hyperlipidemia     Hypertension         Past Surgical History:  Past Surgical History:   Procedure Laterality Date    CATARACT EXTRACTION Left 2022    TORIC    CATARACT EXTRACTION Right 2022    Toric    CATARACT EXTRACTION W/ INTRAOCULAR LENS IMPLANT Left 2022    Ora/  Toric    CORONARY ANGIOPLASTY  2009    RCA AND LCX- LISA    ERCP N/A 2022    Procedure: ERCP (ENDOSCOPIC RETROGRADE CHOLANGIOPANCREATOGRAPHY);  Surgeon: Nohelia Altamirano MD;  Location: [facility];  Service: Endoscopy;  Laterality: N/A;    GALLBLADDER SURGERY  10/2021    LAPAROSCOPIC CHOLECYSTECTOMY N/A 2021    Procedure: CHOLECYSTECTOMY, LAPAROSCOPIC;  Surgeon: Balwinder Castrejon MD;  Location: [facility];  Service: General;  Laterality: N/A;        Home Medications:  Prior to Admission medications    Medication Sig Start Date End Date Taking? Authorizing Provider   metoprolol succinate (TOPROL-XL) 25 MG 24 hr tablet TAKE 1 TABLET EVERY DAY 22  Yes Colette Knott PA-C   pantoprazole (PROTONIX) 40 MG tablet Take 40 mg by mouth once daily. 22  Yes Historical Provider   simvastatin (ZOCOR) 40 MG tablet Take 1 tablet (40 mg total) by mouth every evening. 22  Yes Colette Knott PA-C   CARAFATE 1 gram tablet Take 1 g by mouth 4 (four) times daily. 22   Historical Provider   clopidogreL (PLAVIX) 75 mg tablet TAKE 1 TABLET EVERY DAY 22   Colette Knott PA-C   LEXISCAN 0.4 mg/5 mL Syrg  22   Historical Provider   omeprazole (PRILOSEC) 40 MG capsule TAKE 1 CAPSULE EVERY DAY 21   Colette Knott PA-C        Allergies:  Review of patient's allergies indicates:   Allergen Reactions    No known allergies         Social History:   Social History     Socioeconomic History    Marital status:    Tobacco Use    Smoking status: Former     Types: Cigarettes     Quit date: 2009     Years since quittin.0    Smokeless tobacco: Never   Substance and Sexual Activity    Alcohol use: Yes     Comment: occassionally    Drug use: No       Family History:  Family History   Problem Relation Age of Onset    Heart attack Father     Heart disease Father        ROS: No acute cardiac events, no acute respiratory complaints.     Physical Exam (all patients):    /63 (BP  "Location: Left arm, Patient Position: Lying)   Pulse 62   Temp 97.9 °F (36.6 °C) (Temporal)   Resp 17   Ht 5' 2" (1.575 m)   Wt 64.4 kg (142 lb)   SpO2 96% Comment: room air  Breastfeeding No   BMI 25.97 kg/m²   Lungs: Clear to auscultation bilaterally, respirations unlabored  Heart: Regular rate and rhythm, S1 and S2 normal, no obvious murmurs  Abdomen:         Soft, non-tender, bowel sounds normal, no masses, no organomegaly    Lab Results   Component Value Date    WBC 9.83 07/18/2022    MCV 87 07/18/2022    RDW 14.6 (H) 07/18/2022     07/18/2022    INR 1.0 10/16/2009    GLU 93 08/09/2022    BUN 10 08/09/2022     08/09/2022    K 4.4 08/09/2022     08/09/2022        SEDATION PLAN: per anesthesia      History reviewed, vital signs satisfactory, cardiopulmonary status satisfactory, sedation options, risks and plans have been discussed with the patient  All their questions were answered and the patient agrees to the sedation procedures as planned and the patient is deemed an appropriate candidate for the sedation as planned.    Procedure explained to patient, informed consent obtained and placed in chart.    Nohelia Altamirano  9/20/2022  9:32 AM     " negative - No discharge, No redness

## 2022-09-20 NOTE — TRANSFER OF CARE
"Anesthesia Transfer of Care Note    Patient: Shanda Marrero    Procedure(s) Performed: Procedure(s) (LRB):  ERCP (ENDOSCOPIC RETROGRADE CHOLANGIOPANCREATOGRAPHY) (N/A)    Patient location: GI    Anesthesia Type: general    Transport from OR: Transported from OR on room air with adequate spontaneous ventilation    Post pain: adequate analgesia    Post assessment: no apparent anesthetic complications    Post vital signs: stable    Level of consciousness: awake    Nausea/Vomiting: no nausea/vomiting    Complications: none    Transfer of care protocol was followed      Last vitals:   Visit Vitals  /71 (BP Location: Left arm, Patient Position: Lying)   Pulse 79   Temp 36.5 °C (97.7 °F) (Temporal)   Resp 16   Ht 5' 2" (1.575 m)   Wt 64.4 kg (142 lb)   SpO2 97%   Breastfeeding No   BMI 25.97 kg/m²     "

## 2022-09-20 NOTE — [employer] PATIENT INSTRUCTIONS
Discharge Summary/Instructions after an Endoscopic Procedure  Patient Name: Shanda Marrero  Patient MRN: [id number]  Patient YOB: 1950  Tuesday, September 20, 2022 Nohelia Altamirano MD  Dear patient,  As a result of recent federal legislation (The Federal Cures Act), you may   receive lab or pathology results from your procedure in your [facility]   account before your physician is able to contact you. Your physician or   their representative will relay the results to you with their   recommendations at their soonest availability.  Thank you,  RESTRICTIONS:  During your procedure today, you received medications for sedation.  These   medications may affect your judgment, balance and coordination.  Therefore,   for 24 hours, you have the following restrictions:   - DO NOT drive a car, operate machinery, make legal/financial decisions,   sign important papers or drink alcohol.    ACTIVITY:  Today: no heavy lifting, straining or running due to procedural   sedation/anesthesia.  The following day: return to full activity including work.  DIET:  Eat and drink normally unless instructed otherwise.     TREATMENT FOR COMMON SIDE EFFECTS:  - Mild abdominal pain, nausea, belching, bloating or excessive gas:  rest,   eat lightly and use a heating pad.  - Sore Throat: treat with throat lozenges and/or gargle with warm salt   water.  - Because air was used during the procedure, expelling large amounts of air   from your rectum or belching is normal.  - If a bowel prep was taken, you may not have a bowel movement for 1-3 days.    This is normal.  SYMPTOMS TO WATCH FOR AND REPORT TO YOUR PHYSICIAN:  1. Abdominal pain or bloating, other than gas cramps.  2. Chest pain.  3. Back pain.  4. Signs of infection such as: chills or fever occurring within 24 hours   after the procedure.  5. Rectal bleeding, which would show as bright red, maroon, or black stools.   (A tablespoon of blood from the rectum is not serious,  especially if   hemorrhoids are present.)  6. Vomiting.  7. Weakness or dizziness.  GO DIRECTLY TO THE NEAREST EMERGENCY ROOM IF YOU HAVE ANY OF THE FOLLOWING:      Difficulty breathing              Chills and/or fever over 101 F   Persistent vomiting and/or vomiting blood   Severe abdominal pain   Severe chest pain   Black, tarry stools   Bleeding- more than one tablespoon   Any other symptom or condition that you feel may need urgent attention  Your doctor recommends these additional instructions:  If any biopsies were taken, your doctors clinic will contact you in 1 to 2   weeks with any results.  - Discharge patient to home.   - Resume previous diet.   - Continue present medications.   - Return to GI clinic PRN.  For questions, problems or results please call your physician Nohelia Altamirano MD at Work:  [phone]  If you have any questions about the above instructions, call the GI   department at [phone] or call the endoscopy unit at [phone]   from 7am until 3 pm.  [facility] [place], EMERGENCY ROOM PHONE NUMBER:   [phone]  IF A COMPLICATION OR EMERGENCY SITUATION ARISES AND YOU ARE UNABLE TO REACH   YOUR PHYSICIAN - GO DIRECTLY TO THE EMERGENCY ROOM.  I have read or have had read to me these discharge instructions for my   procedure and have received a written copy.  I understand these   instructions and will follow-up with my physician if I have any questions.     __________________________________       _____________________________________  Nurse Signature                                          Patient/Designated   Responsible Party Signature  MD Nohelia Arreola MD  9/20/2022 10:18:35 AM  This report has been verified and signed electronically.  Dear patient,  As a result of recent federal legislation (The Federal Cures Act), you may   receive lab or pathology results from your procedure in your [facility]   account  before your physician is able to contact you. Your physician or   their representative will relay the results to you with their   recommendations at their soonest availability.  Thank you,  PROVATION

## 2022-09-20 NOTE — ANESTHESIA PREPROCEDURE EVALUATION
09/20/2022  Shanda Marrero is a 71 y.o., female.      Pre-op Assessment    I have reviewed the Patient Summary Reports.     I have reviewed the Nursing Notes. I have reviewed the NPO Status.   I have reviewed the Medications.     Review of Systems  Anesthesia Hx:  No problems with previous Anesthesia  Denies Family Hx of Anesthesia complications.   Denies Personal Hx of Anesthesia complications.   Social:  Former Smoker    Hematology/Oncology:  Hematology Normal   Oncology Normal     EENT/Dental:EENT/Dental Normal   Cardiovascular:   Exercise tolerance: good Hypertension Valvular problems/Murmurs, AS CAD asymptomatic Dysrhythmias  ECG has been reviewed. Hx of atrial flutter.     Echo 4/2022    ? The left ventricle is normal in size with mildly decreased systolic function.  ? Grade I left ventricular diastolic dysfunction.  ? The estimated PA systolic pressure is 26 mmHg.  ? Normal central venous pressure (3 mmHg).  ? Mild tricuspid regurgitation.  ? The estimated ejection fraction is 45-50%.  ? There are segmental left ventricular wall motion abnormalities.  ? There is mild aortic valve stenosis.  ? Aortic valve area is 1.29 cm2; peak velocity is 1.6 m/s; mean gradient is 6 mmHg.  ? Normal right ventricular size with normal right ventricular systolic function.      Pulmonary:  Pulmonary Normal    Renal/:  Renal/ Normal     Hepatic/GI:  Hepatic/GI Normal    Musculoskeletal:  Musculoskeletal Normal    Neurological:  Neurology Normal    Endocrine:  Endocrine Normal    Dermatological:  Skin Normal    Psych:  Psychiatric Normal           Physical Exam  General: Well nourished, Cooperative, Alert and Oriented    Airway:  Mallampati: II   Mouth Opening: Normal  TM Distance: Normal  Tongue: Normal  Neck ROM: Normal ROM    Dental:  Intact  Pt denies loose or removable dentition  Heart:  Rate: Normal  Rhythm:  Regular Rhythm        Anesthesia Plan  Type of Anesthesia, risks & benefits discussed:    Anesthesia Type: MAC, Gen ETT  Intra-op Monitoring Plan: Standard ASA Monitors  Post Op Pain Control Plan: multimodal analgesia  Induction:  IV  Informed Consent: Informed consent signed with the Patient and all parties understand the risks and agree with anesthesia plan.  All questions answered.   ASA Score: 3  Day of Surgery Review of History & Physical: H&P Update referred to the surgeon/provider.I have interviewed and examined the patient. I have reviewed the patient's H&P dated: There are no significant changes.     Ready For Surgery From Anesthesia Perspective.     .

## 2022-09-21 VITALS
TEMPERATURE: 98 F | RESPIRATION RATE: 17 BRPM | DIASTOLIC BLOOD PRESSURE: 74 MMHG | HEIGHT: 62 IN | OXYGEN SATURATION: 99 % | HEART RATE: 68 BPM | WEIGHT: 142 LBS | BODY MASS INDEX: 26.13 KG/M2 | SYSTOLIC BLOOD PRESSURE: 152 MMHG

## 2022-09-25 ENCOUNTER — NURSE TRIAGE (OUTPATIENT)
Dept: ADMINISTRATIVE | Facility: CLINIC | Age: 72
End: 2022-09-25
Payer: MEDICARE

## 2022-09-25 ENCOUNTER — HOSPITAL ENCOUNTER (EMERGENCY)
Facility: HOSPITAL | Age: 72
Discharge: HOME | End: 2022-09-25
Attending: EMERGENCY MEDICINE
Payer: MEDICARE

## 2022-09-25 VITALS
TEMPERATURE: 99 F | WEIGHT: 139.88 LBS | OXYGEN SATURATION: 95 % | HEART RATE: 94 BPM | BODY MASS INDEX: 25.74 KG/M2 | DIASTOLIC BLOOD PRESSURE: 65 MMHG | SYSTOLIC BLOOD PRESSURE: 149 MMHG | RESPIRATION RATE: 20 BRPM | HEIGHT: 62 IN

## 2022-09-25 DIAGNOSIS — R53.1 WEAKNESS: ICD-10-CM

## 2022-09-25 DIAGNOSIS — R50.9 FEVER: ICD-10-CM

## 2022-09-25 DIAGNOSIS — J18.9 PNEUMONIA OF RIGHT UPPER LOBE DUE TO INFECTIOUS ORGANISM: Primary | ICD-10-CM

## 2022-09-25 LAB
ALBUMIN SERPL BCP-MCNC: 3.2 G/DL (ref 3.5–5.2)
ALP SERPL-CCNC: 77 U/L (ref 55–135)
ALT SERPL W/O P-5'-P-CCNC: 9 U/L (ref 10–44)
ANION GAP SERPL CALC-SCNC: 8 MMOL/L (ref 8–16)
AST SERPL-CCNC: 16 U/L (ref 10–40)
BASOPHILS # BLD AUTO: 0.04 K/UL (ref 0–0.2)
BASOPHILS NFR BLD: 0.4 % (ref 0–1.9)
BILIRUB SERPL-MCNC: 1 MG/DL (ref 0.1–1)
BUN SERPL-MCNC: 9 MG/DL (ref 8–23)
CALCIUM SERPL-MCNC: 8.7 MG/DL (ref 8.7–10.5)
CHLORIDE SERPL-SCNC: 107 MMOL/L (ref 95–110)
CO2 SERPL-SCNC: 25 MMOL/L (ref 23–29)
CREAT SERPL-MCNC: 0.7 MG/DL (ref 0.5–1.4)
DIFFERENTIAL METHOD: ABNORMAL
EOSINOPHIL # BLD AUTO: 0 K/UL (ref 0–0.5)
EOSINOPHIL NFR BLD: 0.2 % (ref 0–8)
ERYTHROCYTE [DISTWIDTH] IN BLOOD BY AUTOMATED COUNT: 14.5 % (ref 11.5–14.5)
EST. GFR  (NO RACE VARIABLE): >60 ML/MIN/1.73 M^2
GLUCOSE SERPL-MCNC: 106 MG/DL (ref 70–110)
HCT VFR BLD AUTO: 38.5 % (ref 37–48.5)
HEP C VIRUS HOLD SPECIMEN: NORMAL
HGB BLD-MCNC: 12.7 G/DL (ref 12–16)
IMM GRANULOCYTES # BLD AUTO: 0.04 K/UL (ref 0–0.04)
IMM GRANULOCYTES NFR BLD AUTO: 0.4 % (ref 0–0.5)
INFLUENZA A, MOLECULAR: NEGATIVE
INFLUENZA B, MOLECULAR: NEGATIVE
LACTATE SERPL-SCNC: 2.1 MMOL/L (ref 0.5–2.2)
LYMPHOCYTES # BLD AUTO: 0.5 K/UL (ref 1–4.8)
LYMPHOCYTES NFR BLD: 4.9 % (ref 18–48)
MCH RBC QN AUTO: 28.2 PG (ref 27–31)
MCHC RBC AUTO-ENTMCNC: 33 G/DL (ref 32–36)
MCV RBC AUTO: 85 FL (ref 82–98)
MONOCYTES # BLD AUTO: 0.6 K/UL (ref 0.3–1)
MONOCYTES NFR BLD: 5.2 % (ref 4–15)
NEUTROPHILS # BLD AUTO: 9.5 K/UL (ref 1.8–7.7)
NEUTROPHILS NFR BLD: 88.9 % (ref 38–73)
NRBC BLD-RTO: 0 /100 WBC
PLATELET # BLD AUTO: 189 K/UL (ref 150–450)
PLATELET BLD QL SMEAR: ABNORMAL
PMV BLD AUTO: 11.5 FL (ref 9.2–12.9)
POTASSIUM SERPL-SCNC: 3.9 MMOL/L (ref 3.5–5.1)
PROT SERPL-MCNC: 6.1 G/DL (ref 6–8.4)
RBC # BLD AUTO: 4.51 M/UL (ref 4–5.4)
SARS-COV-2 RDRP RESP QL NAA+PROBE: NEGATIVE
SODIUM SERPL-SCNC: 140 MMOL/L (ref 136–145)
SPECIMEN SOURCE: NORMAL
TROPONIN I SERPL DL<=0.01 NG/ML-MCNC: <0.006 NG/ML (ref 0–0.03)
WBC # BLD AUTO: 10.72 K/UL (ref 3.9–12.7)

## 2022-09-25 PROCEDURE — 83605 ASSAY OF LACTIC ACID: CPT | Performed by: EMERGENCY MEDICINE

## 2022-09-25 PROCEDURE — 93010 EKG 12-LEAD: ICD-10-PCS | Mod: ,,, | Performed by: INTERNAL MEDICINE

## 2022-09-25 PROCEDURE — 85025 COMPLETE CBC W/AUTO DIFF WBC: CPT | Performed by: EMERGENCY MEDICINE

## 2022-09-25 PROCEDURE — 87502 INFLUENZA DNA AMP PROBE: CPT | Performed by: EMERGENCY MEDICINE

## 2022-09-25 PROCEDURE — 93005 ELECTROCARDIOGRAM TRACING: CPT

## 2022-09-25 PROCEDURE — 93010 ELECTROCARDIOGRAM REPORT: CPT | Mod: ,,, | Performed by: INTERNAL MEDICINE

## 2022-09-25 PROCEDURE — 25000003 PHARM REV CODE 250: Performed by: EMERGENCY MEDICINE

## 2022-09-25 PROCEDURE — 87040 BLOOD CULTURE FOR BACTERIA: CPT | Performed by: EMERGENCY MEDICINE

## 2022-09-25 PROCEDURE — 96361 HYDRATE IV INFUSION ADD-ON: CPT

## 2022-09-25 PROCEDURE — 80053 COMPREHEN METABOLIC PANEL: CPT | Performed by: EMERGENCY MEDICINE

## 2022-09-25 PROCEDURE — 84484 ASSAY OF TROPONIN QUANT: CPT | Performed by: EMERGENCY MEDICINE

## 2022-09-25 PROCEDURE — U0002 COVID-19 LAB TEST NON-CDC: HCPCS | Performed by: EMERGENCY MEDICINE

## 2022-09-25 PROCEDURE — 99285 EMERGENCY DEPT VISIT HI MDM: CPT | Mod: 25

## 2022-09-25 PROCEDURE — 96360 HYDRATION IV INFUSION INIT: CPT

## 2022-09-25 RX ORDER — LEVOFLOXACIN 750 MG/1
750 TABLET ORAL DAILY
Qty: 5 TABLET | Refills: 0 | Status: SHIPPED | OUTPATIENT
Start: 2022-09-25 | End: 2022-12-08

## 2022-09-25 RX ORDER — ACETAMINOPHEN 325 MG/1
650 TABLET ORAL EVERY 6 HOURS PRN
Status: DISCONTINUED | OUTPATIENT
Start: 2022-09-25 | End: 2022-09-25 | Stop reason: HOSPADM

## 2022-09-25 RX ADMIN — SODIUM CHLORIDE 1000 ML: 0.9 INJECTION, SOLUTION INTRAVENOUS at 01:09

## 2022-09-25 NOTE — TELEPHONE ENCOUNTER
Son calling states pt started with fever x2 days ago, Max temp 102 this AM. Pt had ERCP on 9/20/22, son worried it has something to do with procedure. Pt and son deny any other symptoms. Per care advice, advised to be seen by HCP within 4 hours. Advised to call back for any further questions or concerns. Son verbalizes understanding.       Reason for Disposition   [1] Fever > 101 F (38.3 C) AND [2] age > 60 years    Additional Information   Negative: Shock suspected (e.g., cold/pale/clammy skin, too weak to stand, low BP, rapid pulse)   Negative: Difficult to awaken or acting confused (e.g., disoriented, slurred speech)   Negative: [1] Difficulty breathing AND [2] bluish lips, tongue or face   Negative: New-onset rash with multiple purple (or blood-colored) spots or dots   Negative: Sounds like a life-threatening emergency to the triager   Negative: [1] Headache AND [2] stiff neck (can't touch chin to chest)   Negative: Difficulty breathing   Negative: IV Drug Use (IVDU)   Negative: [1] Drinking very little AND [2] dehydration suspected (e.g., no urine > 12 hours, very dry mouth, very lightheaded)   Negative: Widespread rash and cause unknown   Negative: Patient sounds very sick or weak to the triager  (Exception: mild weakness and hasn't taken fever medicine)   Negative: Fever > 104 F (40 C)    Protocols used: Fever-A-

## 2022-09-25 NOTE — ED PROVIDER NOTES
SCRIBE #1 NOTE: I, Phu Reese, am scribing for, and in the presence of, Delma Choudhary MD. I have scribed the entire note.       History     Chief Complaint   Patient presents with    Weakness     PT reports having ERCP on Tuesday to remove stent, Is now feeling weak and having fever at home     Review of patient's allergies indicates:   Allergen Reactions    No known allergies          History of Present Illness     HPI    9/25/2022, 12:13 PM  History obtained from the patient and son      History of Present Illness: Shanda Marrero is a 71 y.o. female patient with a PMHx of CAD, hyperlipidemia, HTN, cholecystectomy, and ERCP who presents to the Emergency Department for evaluation of weakness which onset gradually. Per son, pt has cholecystectomy last year which was then by pancreatitis. Pt had ERCP on 7/18/22 and a stent was placed. Pt had ERCP for stent removal on 9/20/2022. Per son, pt has been experiencing a fever x 2 days. Pt states she has been having normal bowel movements. Symptoms are constant and moderate in severity. No mitigating or exacerbating factors reported. Associated sxs include fever. Patient denies any HA, congestion, cough, n/v, SOB, rash, chills, and all other sxs at this time. No further complaints or concerns at this time.       Arrival mode: Personal vehicle      PCP: Jean Vasques MD        Past Medical History:  Past Medical History:   Diagnosis Date    *Atrial flutter     PAROXYSMAL    Acute coronary syndrome     OLD INF MI    Coronary artery disease     Hyperlipidemia     Hypertension        Past Surgical History:  Past Surgical History:   Procedure Laterality Date    CATARACT EXTRACTION Left 03/03/2022    TORIC    CATARACT EXTRACTION Right 03/24/2022    Toric    CATARACT EXTRACTION W/ INTRAOCULAR LENS IMPLANT Left 03/03/2022    [facility]/ Toric    CORONARY ANGIOPLASTY  09/2009    RCA AND LCX- LISA    ERCP N/A 7/18/2022    Procedure: ERCP (ENDOSCOPIC RETROGRADE CHOLANGIOPANCREATOGRAPHY);   Surgeon: Nohelia Altamirano MD;  Location: [facility] ENDO;  Service: Endoscopy;  Laterality: N/A;    ERCP N/A 2022    Procedure: ERCP (ENDOSCOPIC RETROGRADE CHOLANGIOPANCREATOGRAPHY);  Surgeon: Nohelia Altamirano MD;  Location: [facility] ENDO;  Service: Endoscopy;  Laterality: N/A;    GALLBLADDER SURGERY  10/2021    LAPAROSCOPIC CHOLECYSTECTOMY N/A 2021    Procedure: CHOLECYSTECTOMY, LAPAROSCOPIC;  Surgeon: Balwinder Castrejon MD;  Location: [facility] OR;  Service: General;  Laterality: N/A;         Family History:  Family History   Problem Relation Age of Onset    Heart attack Father     Heart disease Father        Social History:  Social History     Tobacco Use    Smoking status: Former     Types: Cigarettes     Quit date: 2009     Years since quittin.0    Smokeless tobacco: Never   Substance and Sexual Activity    Alcohol use: Yes     Comment: occassionally    Drug use: No    Sexual activity: Not on file        Review of Systems     Review of Systems   Constitutional:  Positive for fever. Negative for chills.   HENT:  Negative for congestion and sore throat.    Respiratory:  Negative for cough and shortness of breath.    Cardiovascular:  Negative for chest pain.   Gastrointestinal:  Negative for nausea and vomiting.   Genitourinary:  Negative for dysuria.   Musculoskeletal:  Negative for back pain.   Skin:  Negative for rash.   Neurological:  Negative for weakness.   Hematological:  Does not bruise/bleed easily.   All other systems reviewed and are negative.     Physical Exam     Initial Vitals [22 1121]   BP Pulse Resp Temp SpO2   (!) 101/47 (!) 59 20 98.7 °F (37.1 °C) 95 %      MAP       --          Physical Exam  Nursing Notes and Vital Signs Reviewed.  Constitutional: Patient is in no acute distress. Well-developed and well-nourished.  Head: Atraumatic. Normocephalic.  Eyes: PERRL. EOM intact. Conjunctivae are not pale. No scleral icterus.  ENT: Mucous membranes are moist. Oropharynx is  "clear and symmetric.    Neck: Supple. Full ROM. No lymphadenopathy.  Cardiovascular: Regular rate. Regular rhythm. No murmurs, rubs, or gallops. Distal pulses are 2+ and symmetric.  Pulmonary/Chest: No respiratory distress. Clear to auscultation bilaterally. No wheezing or rales.  Abdominal: Soft and non-distended.  There is no tenderness.  No rebound, guarding, or rigidity. Good bowel sounds.  Genitourinary: No CVA tenderness  Musculoskeletal: Moves all extremities. No obvious deformities. No edema. No calf tenderness.  Skin: Warm and dry.  Neurological:  Alert, awake, and appropriate.  Normal speech.  No acute focal neurological deficits are appreciated.  Psychiatric: Normal affect. Good eye contact. Appropriate in content.     ED Course   Procedures  ED Vital Signs:  Vitals:    09/25/22 1121 09/25/22 1203 09/25/22 1232 09/25/22 1303   BP: (!) 101/47 (!) 108/52 (!) 109/54 (!) 115/55   Pulse: (!) 59 76 74 75   Resp: 20 20 20 20   Temp: 98.7 °F (37.1 °C)      TempSrc: Oral      SpO2: 95% 95% 95% 95%   Weight: 63.5 kg (139 lb 14.1 oz)      Height: 5' 2" (1.575 m)       09/25/22 1503 09/25/22 1533   BP: 120/84 (!) 149/65   Pulse: 99 94   Resp: 20 20   Temp:     TempSrc:     SpO2: 97% 95%   Weight:     Height:         Abnormal Lab Results:  Labs Reviewed   CBC W/ AUTO DIFFERENTIAL - Abnormal; Notable for the following components:       Result Value    Gran # (ANC) 9.5 (*)     Lymph # 0.5 (*)     Gran % 88.9 (*)     Lymph % 4.9 (*)     Platelet Estimate Clumped (*)     All other components within normal limits    Narrative:     Release to patient->Immediate   COMPREHENSIVE METABOLIC PANEL - Abnormal; Notable for the following components:    Albumin 3.2 (*)     ALT 9 (*)     All other components within normal limits   CULTURE, BLOOD   CULTURE, BLOOD   INFLUENZA A & B BY MOLECULAR   HEP C VIRUS HOLD SPECIMEN    Narrative:     Release to patient->Immediate   LACTIC ACID, PLASMA    Narrative:     Release to " patient->Immediate   SARS-COV-2 RNA AMPLIFICATION, QUAL   TROPONIN I        All Lab Results:  Results for orders placed or performed during the hospital encounter of 09/25/22   Blood Culture #2 **CANNOT BE ORDERED STAT**    Specimen: Peripheral, Hand, Left; Blood   Result Value Ref Range    Blood Culture, Routine No Growth to date     Blood Culture, Routine No Growth to date     Blood Culture, Routine No Growth to date    Blood Culture #1 **CANNOT BE ORDERED STAT**    Specimen: Peripheral, Wrist, Left; Blood   Result Value Ref Range    Blood Culture, Routine No Growth to date     Blood Culture, Routine No Growth to date     Blood Culture, Routine No Growth to date    Influenza A & B by Molecular    Specimen: Nasopharyngeal Swab   Result Value Ref Range    Influenza A, Molecular Negative Negative    Influenza B, Molecular Negative Negative    Flu A & B Source Nasal swab    HCV Virus Hold Specimen   Result Value Ref Range    HEP C Virus Hold Specimen Hold for HCV sendout    CBC auto differential   Result Value Ref Range    WBC 10.72 3.90 - 12.70 K/uL    RBC 4.51 4.00 - 5.40 M/uL    Hemoglobin 12.7 12.0 - 16.0 g/dL    Hematocrit 38.5 37.0 - 48.5 %    MCV 85 82 - 98 fL    MCH 28.2 27.0 - 31.0 pg    MCHC 33.0 32.0 - 36.0 g/dL    RDW 14.5 11.5 - 14.5 %    Platelets 189 150 - 450 K/uL    MPV 11.5 9.2 - 12.9 fL    Immature Granulocytes 0.4 0.0 - 0.5 %    Gran # (ANC) 9.5 (H) 1.8 - 7.7 K/uL    Immature Grans (Abs) 0.04 0.00 - 0.04 K/uL    Lymph # 0.5 (L) 1.0 - 4.8 K/uL    Mono # 0.6 0.3 - 1.0 K/uL    Eos # 0.0 0.0 - 0.5 K/uL    Baso # 0.04 0.00 - 0.20 K/uL    nRBC 0 0 /100 WBC    Gran % 88.9 (H) 38.0 - 73.0 %    Lymph % 4.9 (L) 18.0 - 48.0 %    Mono % 5.2 4.0 - 15.0 %    Eosinophil % 0.2 0.0 - 8.0 %    Basophil % 0.4 0.0 - 1.9 %    Platelet Estimate Clumped (A)     Differential Method Automated    Lactic acid, plasma   Result Value Ref Range    Lactate (Lactic Acid) 2.1 0.5 - 2.2 mmol/L   COVID-19 Rapid Screening   Result  Value Ref Range    SARS-CoV-2 RNA, Amplification, Qual Negative Negative   Comprehensive metabolic panel   Result Value Ref Range    Sodium 140 136 - 145 mmol/L    Potassium 3.9 3.5 - 5.1 mmol/L    Chloride 107 95 - 110 mmol/L    CO2 25 23 - 29 mmol/L    Glucose 106 70 - 110 mg/dL    BUN 9 8 - 23 mg/dL    Creatinine 0.7 0.5 - 1.4 mg/dL    Calcium 8.7 8.7 - 10.5 mg/dL    Total Protein 6.1 6.0 - 8.4 g/dL    Albumin 3.2 (L) 3.5 - 5.2 g/dL    Total Bilirubin 1.0 0.1 - 1.0 mg/dL    Alkaline Phosphatase 77 55 - 135 U/L    AST 16 10 - 40 U/L    ALT 9 (L) 10 - 44 U/L    Anion Gap 8 8 - 16 mmol/L    eGFR >60 >60 mL/min/1.73 m^2   Troponin I   Result Value Ref Range    Troponin I <0.006 0.000 - 0.026 ng/mL         Imaging Results:  Imaging Results              X-Ray Chest AP Portable (Final result)  Result time 09/25/22 12:27:17      Final result by DIANA Ross Sr., MD (09/25/22 12:27:17)                   Impression:      1. There has been interval development of a mild amount of alveolar consolidation in the right upper lobe.  This is consistent with the patient's history and characteristic of pneumonia.  2. There are findings characteristic of a moderate size hiatal hernia.  .      Electronically signed by: Radhames Ross MD  Date:    09/25/2022  Time:    12:27               Narrative:    EXAMINATION:  XR CHEST AP PORTABLE    CLINICAL HISTORY:  Fever, unspecified    COMPARISON:  09/25/2009    FINDINGS:  The size of the heart is normal.  There has been interval development of a mild amount of alveolar consolidation in the right upper lobe.  The left lung is clear.  There are findings characteristic of a moderate size hiatal hernia.  There is no pneumothorax.  The costophrenic angles are sharp.                                       The EKG was ordered, reviewed, and independently interpreted by the ED provider.  Interpretation time: 12:17:21  Rate: 75 BPM  Rhythm: normal sinus rhythm  Interpretation: Low voltage QRS.  Incomplete right bundle branch block. Nonspecific T wave abnormality. No acute ST changes. No STEMI.             The Emergency Provider reviewed the vital signs and test results, which are outlined above.     ED Discussion       3:19 PM: Reassessed pt at this time. Discussed with pt all pertinent ED information and results. Discussed pt dx and plan of tx. Gave pt all f/u and return to the ED instructions. All questions and concerns were addressed at this time. Pt expresses understanding of information and instructions, and is comfortable with plan to discharge. Pt is stable for discharge.    I discussed with patient and/or family/caretaker that evaluation in the ED does not suggest any emergent or life threatening medical conditions requiring immediate intervention beyond what was provided in the ED, and I believe patient is safe for discharge.  Regardless, an unremarkable evaluation in the ED does not preclude the development or presence of a serious of life threatening condition. As such, patient was instructed to return immediately for any worsening or change in current symptoms.         Medical Decision Making:   Clinical Tests:   Lab Tests: Ordered and Reviewed  Radiological Study: Reviewed and Ordered  Medical Tests: Ordered and Reviewed         ED Medication(s):  Medications   sodium chloride 0.9% bolus 1,000 mL (0 mLs Intravenous Stopped 9/25/22 1543)       Discharge Medication List as of 9/25/2022  3:20 PM        START taking these medications    Details   levoFLOXacin (LEVAQUIN) 750 MG tablet Take 1 tablet (750 mg total) by mouth once daily., Starting Sun 9/25/2022, Print              Follow-up Information       Jean Vasques MD In 2 days.    Specialty: Family Medicine  Contact information:  [address] [place] [id number]  [phone]               [facility] - Emergency Dept..    Specialty: Emergency Medicine  Why: As needed, If symptoms worsen  Contact information:  [id number] [place] [address]  Drive  [facility] [id number] [phone]                               Scribe Attestation:   Scribe #1: I performed the above scribed service and the documentation accurately describes the services I performed. I attest to the accuracy of the note.     Attending:   Physician Attestation Statement for Scribe #1: I, Delma Choudhary MD, personally performed the services described in this documentation, as scribed by Phu Reese, in my presence, and it is both accurate and complete.           Clinical Impression       ICD-10-CM ICD-9-CM   1. Pneumonia of right upper lobe due to infectious organism  J18.9 486   2. Weakness  R53.1 780.79   3. Fever  R50.9 780.60       Disposition:   Disposition: Discharged  Condition: Stable       Delma Choudhary MD  09/28/22 0512

## 2022-09-26 ENCOUNTER — TELEPHONE (OUTPATIENT)
Dept: GASTROENTEROLOGY | Facility: CLINIC | Age: 72
End: 2022-09-26
Payer: MEDICARE

## 2022-09-26 ENCOUNTER — TELEPHONE (OUTPATIENT)
Dept: FAMILY MEDICINE | Facility: CLINIC | Age: 72
End: 2022-09-26
Payer: MEDICARE

## 2022-09-26 NOTE — TELEPHONE ENCOUNTER
Spoke with patient son regarding his mother. Mr. Marrero stated his mother ran a really high fever yesterday. He stated he took her to ER and they did a chest x-ray and determined she had pneumonia. Son stated they put her on a 5 day antibiotic.   Made appointment for patient for 10/3/2022 at 9:20 to see Dr. Vasques for a x-ray and a hospital follow up.  Patients son verbalized understanding.

## 2022-09-26 NOTE — TELEPHONE ENCOUNTER
Phoned patient to check on her after her call to the after hours nurse for fever after procedure.  Left voice mail for patient to return call.

## 2022-09-30 LAB
BACTERIA BLD CULT: NORMAL
BACTERIA BLD CULT: NORMAL

## 2022-10-03 ENCOUNTER — OFFICE VISIT (OUTPATIENT)
Dept: FAMILY MEDICINE | Facility: CLINIC | Age: 72
End: 2022-10-03
Payer: MEDICARE

## 2022-10-03 VITALS
TEMPERATURE: 98 F | DIASTOLIC BLOOD PRESSURE: 68 MMHG | SYSTOLIC BLOOD PRESSURE: 110 MMHG | BODY MASS INDEX: 26.25 KG/M2 | WEIGHT: 142.63 LBS | HEART RATE: 66 BPM | OXYGEN SATURATION: 99 % | HEIGHT: 62 IN

## 2022-10-03 DIAGNOSIS — J18.9 PNEUMONIA DUE TO INFECTIOUS ORGANISM, UNSPECIFIED LATERALITY, UNSPECIFIED PART OF LUNG: Primary | ICD-10-CM

## 2022-10-03 DIAGNOSIS — Z12.31 ENCOUNTER FOR SCREENING MAMMOGRAM FOR MALIGNANT NEOPLASM OF BREAST: ICD-10-CM

## 2022-10-03 DIAGNOSIS — I25.10 CORONARY ARTERY DISEASE, OCCLUSIVE: ICD-10-CM

## 2022-10-03 DIAGNOSIS — K80.50 CHOLEDOCHOLITHIASIS: ICD-10-CM

## 2022-10-03 PROCEDURE — 90694 VACC AIIV4 NO PRSRV 0.5ML IM: CPT | Mod: S$GLB,,, | Performed by: FAMILY MEDICINE

## 2022-10-03 PROCEDURE — 3078F DIAST BP <80 MM HG: CPT | Mod: CPTII,S$GLB,, | Performed by: FAMILY MEDICINE

## 2022-10-03 PROCEDURE — 3008F PR BODY MASS INDEX (BMI) DOCUMENTED: ICD-10-PCS | Mod: CPTII,S$GLB,, | Performed by: FAMILY MEDICINE

## 2022-10-03 PROCEDURE — 3008F BODY MASS INDEX DOCD: CPT | Mod: CPTII,S$GLB,, | Performed by: FAMILY MEDICINE

## 2022-10-03 PROCEDURE — 99214 OFFICE O/P EST MOD 30 MIN: CPT | Mod: 25,S$GLB,, | Performed by: FAMILY MEDICINE

## 2022-10-03 PROCEDURE — 3288F PR FALLS RISK ASSESSMENT DOCUMENTED: ICD-10-PCS | Mod: CPTII,S$GLB,, | Performed by: FAMILY MEDICINE

## 2022-10-03 PROCEDURE — 1126F PR PAIN SEVERITY QUANTIFIED, NO PAIN PRESENT: ICD-10-PCS | Mod: CPTII,S$GLB,, | Performed by: FAMILY MEDICINE

## 2022-10-03 PROCEDURE — 3078F PR MOST RECENT DIASTOLIC BLOOD PRESSURE < 80 MM HG: ICD-10-PCS | Mod: CPTII,S$GLB,, | Performed by: FAMILY MEDICINE

## 2022-10-03 PROCEDURE — 99214 PR OFFICE/OUTPT VISIT, EST, LEVL IV, 30-39 MIN: ICD-10-PCS | Mod: 25,S$GLB,, | Performed by: FAMILY MEDICINE

## 2022-10-03 PROCEDURE — G0008 FLU VACCINE - QUADRIVALENT - ADJUVANTED: ICD-10-PCS | Mod: S$GLB,,, | Performed by: FAMILY MEDICINE

## 2022-10-03 PROCEDURE — 3074F PR MOST RECENT SYSTOLIC BLOOD PRESSURE < 130 MM HG: ICD-10-PCS | Mod: CPTII,S$GLB,, | Performed by: FAMILY MEDICINE

## 2022-10-03 PROCEDURE — 3074F SYST BP LT 130 MM HG: CPT | Mod: CPTII,S$GLB,, | Performed by: FAMILY MEDICINE

## 2022-10-03 PROCEDURE — 90694 FLU VACCINE - QUADRIVALENT - ADJUVANTED: ICD-10-PCS | Mod: S$GLB,,, | Performed by: FAMILY MEDICINE

## 2022-10-03 PROCEDURE — 1126F AMNT PAIN NOTED NONE PRSNT: CPT | Mod: CPTII,S$GLB,, | Performed by: FAMILY MEDICINE

## 2022-10-03 PROCEDURE — 1159F PR MEDICATION LIST DOCUMENTED IN MEDICAL RECORD: ICD-10-PCS | Mod: CPTII,S$GLB,, | Performed by: FAMILY MEDICINE

## 2022-10-03 PROCEDURE — 1159F MED LIST DOCD IN RCRD: CPT | Mod: CPTII,S$GLB,, | Performed by: FAMILY MEDICINE

## 2022-10-03 PROCEDURE — 99999 PR PBB SHADOW E&M-EST. PATIENT-LVL IV: ICD-10-PCS | Mod: PBBFAC,,, | Performed by: FAMILY MEDICINE

## 2022-10-03 PROCEDURE — G0008 ADMIN INFLUENZA VIRUS VAC: HCPCS | Mod: S$GLB,,, | Performed by: FAMILY MEDICINE

## 2022-10-03 PROCEDURE — 1101F PR PT FALLS ASSESS DOC 0-1 FALLS W/OUT INJ PAST YR: ICD-10-PCS | Mod: CPTII,S$GLB,, | Performed by: FAMILY MEDICINE

## 2022-10-03 PROCEDURE — 3288F FALL RISK ASSESSMENT DOCD: CPT | Mod: CPTII,S$GLB,, | Performed by: FAMILY MEDICINE

## 2022-10-03 PROCEDURE — 99999 PR PBB SHADOW E&M-EST. PATIENT-LVL IV: CPT | Mod: PBBFAC,,, | Performed by: FAMILY MEDICINE

## 2022-10-03 PROCEDURE — 1101F PT FALLS ASSESS-DOCD LE1/YR: CPT | Mod: CPTII,S$GLB,, | Performed by: FAMILY MEDICINE

## 2022-10-03 NOTE — PROGRESS NOTES
"Subjective:       Patient ID: Shanda Marrero is a 71 y.o. female.    Chief Complaint: Hospital Follow Up      HPI Comments:       Current Outpatient Medications:     clopidogreL (PLAVIX) 75 mg tablet, TAKE 1 TABLET EVERY DAY, Disp: 90 tablet, Rfl: 3    levoFLOXacin (LEVAQUIN) 750 MG tablet, Take 1 tablet (750 mg total) by mouth once daily., Disp: 5 tablet, Rfl: 0    metoprolol succinate (TOPROL-XL) 25 MG 24 hr tablet, TAKE 1 TABLET EVERY DAY, Disp: 90 tablet, Rfl: 3    pantoprazole (PROTONIX) 40 MG tablet, Take 40 mg by mouth once daily., Disp: , Rfl:     simvastatin (ZOCOR) 40 MG tablet, Take 1 tablet (40 mg total) by mouth every evening., Disp: 90 tablet, Rfl: 2      ER follow-up from 09/28 for right upper lobe pneumonia, not long after endoscopy procedure (ERCP for stent removal).  Discharged on Levaquin.      Taking her last antibiotic today.  Feels much better.  Eating well.  Not much cough.  No fever or chills.    Needs a new order for mammogram.    Gets a flu shot today.    Review of Systems   Constitutional:  Negative for activity change, appetite change and fever.   HENT:  Negative for sore throat.    Respiratory:  Negative for cough and shortness of breath.    Cardiovascular:  Negative for chest pain.   Gastrointestinal:  Negative for abdominal pain, diarrhea and nausea.   Genitourinary:  Negative for difficulty urinating.   Musculoskeletal:  Negative for arthralgias and myalgias.   Neurological:  Negative for dizziness and headaches.     Objective:      Vitals:    10/03/22 0923   BP: 110/68   Pulse: 66   Temp: 97.7 °F (36.5 °C)   TempSrc: Temporal   SpO2: 99%   Weight: 64.7 kg (142 lb 10.2 oz)   Height: 5' 2" (1.575 m)   PainSc: 0-No pain     Physical Exam  Vitals and nursing note reviewed.   Constitutional:       General: She is not in acute distress.     Appearance: She is well-developed. She is not ill-appearing or diaphoretic.   HENT:      Head: Normocephalic.   Neck:      Thyroid: No thyromegaly. "   Cardiovascular:      Rate and Rhythm: Normal rate and regular rhythm.      Heart sounds: Normal heart sounds. No murmur heard.  Pulmonary:      Effort: Pulmonary effort is normal.      Breath sounds: Normal breath sounds. No wheezing or rales.   Abdominal:      General: There is no distension.      Palpations: Abdomen is soft.   Musculoskeletal:      Cervical back: Neck supple.   Lymphadenopathy:      Cervical: No cervical adenopathy.   Skin:     General: Skin is warm and dry.   Neurological:      Mental Status: She is alert and oriented to person, place, and time.   Psychiatric:         Mood and Affect: Mood normal.         Behavior: Behavior normal.         Thought Content: Thought content normal.         Judgment: Judgment normal.       Assessment:       1. Pneumonia due to infectious organism, unspecified laterality, unspecified part of lung    2. Choledocholithiasis    3. Coronary artery disease, occlusive    4. Encounter for screening mammogram for malignant neoplasm of breast           Plan:   Pneumonia due to infectious organism, unspecified laterality, unspecified part of lung  Comments:  Resolved after 1 week of Levaquin.  Normal lung exam    Choledocholithiasis  Comments:  Resolved.  Recent stent removal by ERCP    Coronary artery disease, occlusive  Comments:  on plavix and statiin    Encounter for screening mammogram for malignant neoplasm of breast   Comments:  Mammogram ordered  Orders:  -     Mammo Digital Screening Bilat w/ [facility]; Future; Expected date: 10/03/2022

## 2022-10-10 ENCOUNTER — OFFICE VISIT (OUTPATIENT)
Dept: CARDIOLOGY | Facility: CLINIC | Age: 72
End: 2022-10-10
Payer: MEDICARE

## 2022-10-10 VITALS
HEIGHT: 62 IN | SYSTOLIC BLOOD PRESSURE: 100 MMHG | OXYGEN SATURATION: 95 % | BODY MASS INDEX: 26.13 KG/M2 | HEART RATE: 73 BPM | RESPIRATION RATE: 18 BRPM | DIASTOLIC BLOOD PRESSURE: 70 MMHG | WEIGHT: 142 LBS

## 2022-10-10 DIAGNOSIS — I10 BENIGN ESSENTIAL HTN: ICD-10-CM

## 2022-10-10 DIAGNOSIS — J18.9 PNEUMONIA DUE TO INFECTIOUS ORGANISM, UNSPECIFIED LATERALITY, UNSPECIFIED PART OF LUNG: Primary | ICD-10-CM

## 2022-10-10 DIAGNOSIS — E78.5 DYSLIPIDEMIA: ICD-10-CM

## 2022-10-10 DIAGNOSIS — I25.10 CORONARY ARTERY DISEASE, OCCLUSIVE: ICD-10-CM

## 2022-10-10 DIAGNOSIS — Z98.61 HISTORY OF CORONARY ANGIOPLASTY: Chronic | ICD-10-CM

## 2022-10-10 PROCEDURE — 1101F PT FALLS ASSESS-DOCD LE1/YR: CPT | Mod: CPTII,S$GLB,, | Performed by: INTERNAL MEDICINE

## 2022-10-10 PROCEDURE — 3078F PR MOST RECENT DIASTOLIC BLOOD PRESSURE < 80 MM HG: ICD-10-PCS | Mod: CPTII,S$GLB,, | Performed by: INTERNAL MEDICINE

## 2022-10-10 PROCEDURE — 3008F PR BODY MASS INDEX (BMI) DOCUMENTED: ICD-10-PCS | Mod: CPTII,S$GLB,, | Performed by: INTERNAL MEDICINE

## 2022-10-10 PROCEDURE — 3074F PR MOST RECENT SYSTOLIC BLOOD PRESSURE < 130 MM HG: ICD-10-PCS | Mod: CPTII,S$GLB,, | Performed by: INTERNAL MEDICINE

## 2022-10-10 PROCEDURE — 3288F PR FALLS RISK ASSESSMENT DOCUMENTED: ICD-10-PCS | Mod: CPTII,S$GLB,, | Performed by: INTERNAL MEDICINE

## 2022-10-10 PROCEDURE — 3074F SYST BP LT 130 MM HG: CPT | Mod: CPTII,S$GLB,, | Performed by: INTERNAL MEDICINE

## 2022-10-10 PROCEDURE — 1101F PR PT FALLS ASSESS DOC 0-1 FALLS W/OUT INJ PAST YR: ICD-10-PCS | Mod: CPTII,S$GLB,, | Performed by: INTERNAL MEDICINE

## 2022-10-10 PROCEDURE — 99214 PR OFFICE/OUTPT VISIT, EST, LEVL IV, 30-39 MIN: ICD-10-PCS | Mod: S$GLB,,, | Performed by: INTERNAL MEDICINE

## 2022-10-10 PROCEDURE — 99999 PR PBB SHADOW E&M-EST. PATIENT-LVL III: ICD-10-PCS | Mod: PBBFAC,,, | Performed by: INTERNAL MEDICINE

## 2022-10-10 PROCEDURE — 1126F PR PAIN SEVERITY QUANTIFIED, NO PAIN PRESENT: ICD-10-PCS | Mod: CPTII,S$GLB,, | Performed by: INTERNAL MEDICINE

## 2022-10-10 PROCEDURE — 3008F BODY MASS INDEX DOCD: CPT | Mod: CPTII,S$GLB,, | Performed by: INTERNAL MEDICINE

## 2022-10-10 PROCEDURE — 99999 PR PBB SHADOW E&M-EST. PATIENT-LVL III: CPT | Mod: PBBFAC,,, | Performed by: INTERNAL MEDICINE

## 2022-10-10 PROCEDURE — 99214 OFFICE O/P EST MOD 30 MIN: CPT | Mod: S$GLB,,, | Performed by: INTERNAL MEDICINE

## 2022-10-10 PROCEDURE — 3078F DIAST BP <80 MM HG: CPT | Mod: CPTII,S$GLB,, | Performed by: INTERNAL MEDICINE

## 2022-10-10 PROCEDURE — 3288F FALL RISK ASSESSMENT DOCD: CPT | Mod: CPTII,S$GLB,, | Performed by: INTERNAL MEDICINE

## 2022-10-10 PROCEDURE — 1126F AMNT PAIN NOTED NONE PRSNT: CPT | Mod: CPTII,S$GLB,, | Performed by: INTERNAL MEDICINE

## 2022-10-11 ENCOUNTER — OFFICE VISIT (OUTPATIENT)
Dept: OPHTHALMOLOGY | Facility: CLINIC | Age: 72
End: 2022-10-11
Payer: MEDICARE

## 2022-10-11 DIAGNOSIS — Z96.1 PSEUDOPHAKIA OF BOTH EYES: Primary | ICD-10-CM

## 2022-10-11 PROCEDURE — 1159F PR MEDICATION LIST DOCUMENTED IN MEDICAL RECORD: ICD-10-PCS | Mod: CPTII,S$GLB,, | Performed by: OPTOMETRIST

## 2022-10-11 PROCEDURE — 99999 PR PBB SHADOW E&M-EST. PATIENT-LVL II: ICD-10-PCS | Mod: PBBFAC,,, | Performed by: OPTOMETRIST

## 2022-10-11 PROCEDURE — 92014 PR EYE EXAM, EST PATIENT,COMPREHESV: ICD-10-PCS | Mod: S$GLB,,, | Performed by: OPTOMETRIST

## 2022-10-11 PROCEDURE — 1160F RVW MEDS BY RX/DR IN RCRD: CPT | Mod: CPTII,S$GLB,, | Performed by: OPTOMETRIST

## 2022-10-11 PROCEDURE — 92014 COMPRE OPH EXAM EST PT 1/>: CPT | Mod: S$GLB,,, | Performed by: OPTOMETRIST

## 2022-10-11 PROCEDURE — 1159F MED LIST DOCD IN RCRD: CPT | Mod: CPTII,S$GLB,, | Performed by: OPTOMETRIST

## 2022-10-11 PROCEDURE — 99999 PR PBB SHADOW E&M-EST. PATIENT-LVL II: CPT | Mod: PBBFAC,,, | Performed by: OPTOMETRIST

## 2022-10-11 PROCEDURE — 1160F PR REVIEW ALL MEDS BY PRESCRIBER/CLIN PHARMACIST DOCUMENTED: ICD-10-PCS | Mod: CPTII,S$GLB,, | Performed by: OPTOMETRIST

## 2022-10-11 NOTE — PROGRESS NOTES
HPI     Follow-up            Comments: 6 month PSEUDO           Comments    States that her vision is great that she has not complaints thus far.     1. PHACO OS with ORA, + 16.0 SN6AT4 NRR on [place] (near) 3/3/22  CDE=13.93  PHACO OD 3/24/2022 TORIC / SN6AT6 12.5/ CDE: 10.47    SCLS wearer   Wore multi- focal in the past and then Mono-vision    OD DOMINANT  **PLAVIX** x 13 yrs      DROP LESS            Last edited by Clarita Betancourt on 10/11/2022  1:41 PM.            Assessment /Plan     For exam results, see Encounter Report.    Pseudophakia of both eyes  Stable OU  Monitor 12 months      RTC 1 yr for dilated eye exam or PRN if any problems.   Discussed above and answered questions.

## 2022-11-01 ENCOUNTER — TELEPHONE (OUTPATIENT)
Dept: FAMILY MEDICINE | Facility: CLINIC | Age: 72
End: 2022-11-01
Payer: MEDICARE

## 2022-11-01 DIAGNOSIS — K92.1 BLOOD IN STOOL: Primary | ICD-10-CM

## 2022-11-01 DIAGNOSIS — R19.5 POSITIVE FIT (FECAL IMMUNOCHEMICAL TEST): ICD-10-CM

## 2022-11-15 ENCOUNTER — HOSPITAL ENCOUNTER (OUTPATIENT)
Dept: RADIOLOGY | Facility: HOSPITAL | Age: 72
Discharge: HOME | End: 2022-11-15
Attending: FAMILY MEDICINE
Payer: MEDICARE

## 2022-11-15 DIAGNOSIS — Z12.31 ENCOUNTER FOR SCREENING MAMMOGRAM FOR MALIGNANT NEOPLASM OF BREAST: ICD-10-CM

## 2022-11-15 PROCEDURE — 77063 BREAST TOMOSYNTHESIS BI: CPT | Mod: 26,,, | Performed by: RADIOLOGY

## 2022-11-15 PROCEDURE — 77067 SCR MAMMO BI INCL CAD: CPT | Mod: 26,,, | Performed by: RADIOLOGY

## 2022-11-15 PROCEDURE — 77067 SCR MAMMO BI INCL CAD: CPT | Mod: TC

## 2022-11-15 PROCEDURE — 77063 MAMMO DIGITAL SCREENING BILAT WITH TOMO: ICD-10-PCS | Mod: 26,,, | Performed by: RADIOLOGY

## 2022-11-15 PROCEDURE — 77063 BREAST TOMOSYNTHESIS BI: CPT | Mod: TC

## 2022-11-15 PROCEDURE — 77067 MAMMO DIGITAL SCREENING BILAT WITH TOMO: ICD-10-PCS | Mod: 26,,, | Performed by: RADIOLOGY

## 2022-12-08 ENCOUNTER — OFFICE VISIT (OUTPATIENT)
Dept: PULMONOLOGY | Facility: CLINIC | Age: 72
End: 2022-12-08
Payer: MEDICARE

## 2022-12-08 VITALS
WEIGHT: 137.88 LBS | DIASTOLIC BLOOD PRESSURE: 79 MMHG | HEART RATE: 77 BPM | HEIGHT: 62 IN | BODY MASS INDEX: 25.37 KG/M2 | SYSTOLIC BLOOD PRESSURE: 120 MMHG | RESPIRATION RATE: 20 BRPM | OXYGEN SATURATION: 97 %

## 2022-12-08 DIAGNOSIS — J18.9 PNEUMONIA DUE TO INFECTIOUS ORGANISM, UNSPECIFIED LATERALITY, UNSPECIFIED PART OF LUNG: ICD-10-CM

## 2022-12-08 DIAGNOSIS — E78.5 DYSLIPIDEMIA: Primary | Chronic | ICD-10-CM

## 2022-12-08 DIAGNOSIS — I25.10 CORONARY ARTERY DISEASE, OCCLUSIVE: ICD-10-CM

## 2022-12-08 PROCEDURE — 1101F PR PT FALLS ASSESS DOC 0-1 FALLS W/OUT INJ PAST YR: ICD-10-PCS | Mod: HCNC,CPTII,S$GLB, | Performed by: PHYSICIAN ASSISTANT

## 2022-12-08 PROCEDURE — 1159F PR MEDICATION LIST DOCUMENTED IN MEDICAL RECORD: ICD-10-PCS | Mod: HCNC,CPTII,S$GLB, | Performed by: PHYSICIAN ASSISTANT

## 2022-12-08 PROCEDURE — 3288F PR FALLS RISK ASSESSMENT DOCUMENTED: ICD-10-PCS | Mod: HCNC,CPTII,S$GLB, | Performed by: PHYSICIAN ASSISTANT

## 2022-12-08 PROCEDURE — 99204 PR OFFICE/OUTPT VISIT, NEW, LEVL IV, 45-59 MIN: ICD-10-PCS | Mod: HCNC,S$GLB,, | Performed by: PHYSICIAN ASSISTANT

## 2022-12-08 PROCEDURE — 1159F MED LIST DOCD IN RCRD: CPT | Mod: HCNC,CPTII,S$GLB, | Performed by: PHYSICIAN ASSISTANT

## 2022-12-08 PROCEDURE — 99204 OFFICE O/P NEW MOD 45 MIN: CPT | Mod: HCNC,S$GLB,, | Performed by: PHYSICIAN ASSISTANT

## 2022-12-08 PROCEDURE — 99999 PR PBB SHADOW E&M-EST. PATIENT-LVL IV: CPT | Mod: PBBFAC,HCNC,, | Performed by: PHYSICIAN ASSISTANT

## 2022-12-08 PROCEDURE — 1101F PT FALLS ASSESS-DOCD LE1/YR: CPT | Mod: HCNC,CPTII,S$GLB, | Performed by: PHYSICIAN ASSISTANT

## 2022-12-08 PROCEDURE — 3074F SYST BP LT 130 MM HG: CPT | Mod: HCNC,CPTII,S$GLB, | Performed by: PHYSICIAN ASSISTANT

## 2022-12-08 PROCEDURE — 3008F PR BODY MASS INDEX (BMI) DOCUMENTED: ICD-10-PCS | Mod: HCNC,CPTII,S$GLB, | Performed by: PHYSICIAN ASSISTANT

## 2022-12-08 PROCEDURE — 3078F PR MOST RECENT DIASTOLIC BLOOD PRESSURE < 80 MM HG: ICD-10-PCS | Mod: HCNC,CPTII,S$GLB, | Performed by: PHYSICIAN ASSISTANT

## 2022-12-08 PROCEDURE — 3074F PR MOST RECENT SYSTOLIC BLOOD PRESSURE < 130 MM HG: ICD-10-PCS | Mod: HCNC,CPTII,S$GLB, | Performed by: PHYSICIAN ASSISTANT

## 2022-12-08 PROCEDURE — 1160F PR REVIEW ALL MEDS BY PRESCRIBER/CLIN PHARMACIST DOCUMENTED: ICD-10-PCS | Mod: HCNC,CPTII,S$GLB, | Performed by: PHYSICIAN ASSISTANT

## 2022-12-08 PROCEDURE — 1160F RVW MEDS BY RX/DR IN RCRD: CPT | Mod: HCNC,CPTII,S$GLB, | Performed by: PHYSICIAN ASSISTANT

## 2022-12-08 PROCEDURE — 99999 PR PBB SHADOW E&M-EST. PATIENT-LVL IV: ICD-10-PCS | Mod: PBBFAC,HCNC,, | Performed by: PHYSICIAN ASSISTANT

## 2022-12-08 PROCEDURE — 3288F FALL RISK ASSESSMENT DOCD: CPT | Mod: HCNC,CPTII,S$GLB, | Performed by: PHYSICIAN ASSISTANT

## 2022-12-08 PROCEDURE — 3008F BODY MASS INDEX DOCD: CPT | Mod: HCNC,CPTII,S$GLB, | Performed by: PHYSICIAN ASSISTANT

## 2022-12-08 PROCEDURE — 3078F DIAST BP <80 MM HG: CPT | Mod: HCNC,CPTII,S$GLB, | Performed by: PHYSICIAN ASSISTANT

## 2022-12-08 RX ORDER — TRAZODONE HYDROCHLORIDE 100 MG/1
100 TABLET ORAL NIGHTLY
COMMUNITY
Start: 2022-11-13

## 2022-12-08 NOTE — PROGRESS NOTES
"Subjective:       Patient ID: Shanda Marrero is a 72 y.o. female.    Chief Complaint: pneumonia      73yo female referred by Shannan Marcus MD for pneumonia  Right upper lobe pneumonia 9/2022 went to ER, treated outpatient with levaquin  Incidental finding of right middle lobe nodules on abdominal CT 7/2022 post cholecystectomy, suspected infectious etiology, has not had f/u chest CT  "2. Multiple nodules in a bronchovascular distribution in the middle lobe measuring up to 8 mm.  Findings are most likely infectious or inflammatory nature.  Clinical correlation is advised.  Follow-up CT scan in 8-12 weeks recommended to re-evaluate after treatment of presumed infectious or inflammatory process."    Quit smoking 14 years ago, 20 pack year history    She denies SOB, chest pain, cough, wheezing, sputum production, weight loss or fever  No history of cancer or lung disease  She does not take any immune suppressants     Immunization History   Administered Date(s) Administered    COVID-19 MRNA, LN-S PF (MODERNA HALF 0.25 ML DOSE) 11/22/2021, 05/24/2022    Influenza (FLUAD) - Quadrivalent - Adjuvanted - PF *Preferred* (65+) 12/09/2020, 10/03/2022    Influenza - High Dose - PF (65 years and older) 11/05/2019    Influenza - Quadrivalent 12/10/2016    Pneumococcal Conjugate - 13 Valent 12/09/2020    Pneumococcal Conjugate - 20 Valent 07/22/2022    Tdap 07/17/2018      Tobacco Use: Medium Risk    Smoking Tobacco Use: Former    Smokeless Tobacco Use: Never    Passive Exposure: Not on file      Past Medical History:   Diagnosis Date    *Atrial flutter     PAROXYSMAL    Acute coronary syndrome     OLD INF MI    Coronary artery disease     Hyperlipidemia     Hypertension       Current Outpatient Medications on File Prior to Visit   Medication Sig Dispense Refill    clopidogreL (PLAVIX) 75 mg tablet TAKE 1 TABLET EVERY DAY 90 tablet 3    metoprolol succinate (TOPROL-XL) 25 MG 24 hr tablet TAKE 1 TABLET EVERY DAY 90 tablet 3    " "simvastatin (ZOCOR) 40 MG tablet Take 1 tablet (40 mg total) by mouth every evening. 90 tablet 2    traZODone (DESYREL) 100 MG tablet Take 100 mg by mouth every evening.      [DISCONTINUED] levoFLOXacin (LEVAQUIN) 750 MG tablet Take 1 tablet (750 mg total) by mouth once daily. 5 tablet 0    [DISCONTINUED] pantoprazole (PROTONIX) 40 MG tablet Take 40 mg by mouth once daily.       No current facility-administered medications on file prior to visit.        Review of Systems   Constitutional:  Negative for fever, weight loss, appetite change, fatigue and weakness.   HENT:  Negative for postnasal drip, rhinorrhea, sinus pressure, trouble swallowing and congestion.    Respiratory:  Negative for cough, sputum production, choking, chest tightness, shortness of breath, wheezing and dyspnea on extertion.    Cardiovascular:  Negative for chest pain and leg swelling.   Musculoskeletal:  Negative for arthralgias, gait problem and joint swelling.   Gastrointestinal:  Negative for nausea, vomiting and abdominal pain.   Neurological:  Negative for dizziness, weakness and headaches.   All other systems reviewed and are negative.    Objective:       Vitals:    12/08/22 1113   BP: 120/79   Pulse: 77   Resp: 20   SpO2: 97%   Weight: 62.5 kg (137 lb 14.4 oz)   Height: 5' 2" (1.575 m)       Physical Exam   Constitutional: She is oriented to person, place, and time. She appears well-developed and well-nourished. No distress.   HENT:   Head: Normocephalic.   Nose: Nose normal.   Mouth/Throat: Oropharynx is clear and moist.   Cardiovascular: Normal rate and regular rhythm.   Pulmonary/Chest: Effort normal and breath sounds normal. No respiratory distress. She has no wheezes. She has no rhonchi. She has no rales.   Musculoskeletal:         General: No edema.      Cervical back: Normal range of motion and neck supple.   Lymphadenopathy: No supraclavicular adenopathy is present.     She has no cervical adenopathy.   Neurological: She is alert " and oriented to person, place, and time. Gait normal.   Skin: Skin is warm and dry.   Psychiatric: She has a normal mood and affect.   Vitals reviewed.  Personal Diagnostic Review    Chest X Ray 9/2022  EXAMINATION:  XR CHEST AP PORTABLE     CLINICAL HISTORY:  Fever, unspecified     COMPARISON:  09/25/2009     FINDINGS:  The size of the heart is normal.  There has been interval development of a mild amount of alveolar consolidation in the right upper lobe.  The left lung is clear.  There are findings characteristic of a moderate size hiatal hernia.  There is no pneumothorax.  The costophrenic angles are sharp.     Impression:     1. There has been interval development of a mild amount of alveolar consolidation in the right upper lobe.  This is consistent with the patient's history and characteristic of pneumonia.  2. There are findings characteristic of a moderate size hiatal hernia.    Electronically signed by: Radhames Ross MD  Date:                                            09/25/2022  Time:                                           12:27    Abdominal CT 7/2022  FINDINGS:  LUNG BASES: There are numerous nodules in a bronchovascular distribution within the middle lobe, measuring up to 8 mm in size.  Dependent atelectasis in the lung bases.  Left lower lobe calcified granuloma.  Lung bases are otherwise clear.  Negative for pleural or pericardial effusions. The distal esophagus is normal.  Large hiatal hernia.  Coronary artery calcifications.  Calcified left hilar lymph nodes.         Assessment/Plan:       Problem List Items Addressed This Visit          Pulmonary    Pneumonia due to infectious organism     9/2022, treated with levaquin  Patient denies SOB, cough, fever, weight loss, or chest pain  Will get follow up chest CT         Relevant Orders    CT Chest Without Contrast       Cardiac/Vascular    Dyslipidemia - Primary (Chronic)     Controlled on statin         Coronary artery disease, occlusive      Stable, F/u regularly with cardiology          Follow up for chest CT and f/u next available.    Discussed diagnosis, its evaluation, treatment and usual course. All questions answered.    Patient verbalized understanding of plan and left in no acute distress    Thank you for the courtesy of participating in the care of this patient    Susy Barajas PA-C  [facility]

## 2022-12-08 NOTE — ASSESSMENT & PLAN NOTE
9/2022, treated with levaquin  Patient denies SOB, cough, fever, weight loss, or chest pain  Will get follow up chest CT

## 2022-12-22 ENCOUNTER — HOSPITAL ENCOUNTER (OUTPATIENT)
Dept: RADIOLOGY | Facility: HOSPITAL | Age: 72
Discharge: HOME | End: 2022-12-22
Attending: PHYSICIAN ASSISTANT
Payer: MEDICARE

## 2022-12-22 DIAGNOSIS — J18.9 PNEUMONIA DUE TO INFECTIOUS ORGANISM, UNSPECIFIED LATERALITY, UNSPECIFIED PART OF LUNG: ICD-10-CM

## 2022-12-22 PROCEDURE — 71250 CT THORAX DX C-: CPT | Mod: TC,HCNC

## 2022-12-30 ENCOUNTER — OFFICE VISIT (OUTPATIENT)
Dept: PULMONOLOGY | Facility: CLINIC | Age: 72
End: 2022-12-30
Payer: MEDICARE

## 2022-12-30 VITALS
WEIGHT: 138.13 LBS | SYSTOLIC BLOOD PRESSURE: 120 MMHG | HEART RATE: 75 BPM | OXYGEN SATURATION: 98 % | HEIGHT: 62 IN | DIASTOLIC BLOOD PRESSURE: 80 MMHG | BODY MASS INDEX: 25.42 KG/M2 | RESPIRATION RATE: 18 BRPM

## 2022-12-30 DIAGNOSIS — J01.80 OTHER ACUTE SINUSITIS, RECURRENCE NOT SPECIFIED: Primary | ICD-10-CM

## 2022-12-30 DIAGNOSIS — F17.211 CIGARETTE NICOTINE DEPENDENCE IN REMISSION: ICD-10-CM

## 2022-12-30 DIAGNOSIS — I25.10 CORONARY ARTERY DISEASE, OCCLUSIVE: ICD-10-CM

## 2022-12-30 DIAGNOSIS — J43.8 OTHER EMPHYSEMA: ICD-10-CM

## 2022-12-30 PROBLEM — J18.9 PNEUMONIA DUE TO INFECTIOUS ORGANISM: Status: RESOLVED | Noted: 2022-12-08 | Resolved: 2022-12-30

## 2022-12-30 PROCEDURE — 1159F MED LIST DOCD IN RCRD: CPT | Mod: HCNC,CPTII,S$GLB, | Performed by: PHYSICIAN ASSISTANT

## 2022-12-30 PROCEDURE — 1159F PR MEDICATION LIST DOCUMENTED IN MEDICAL RECORD: ICD-10-PCS | Mod: HCNC,CPTII,S$GLB, | Performed by: PHYSICIAN ASSISTANT

## 2022-12-30 PROCEDURE — 3079F DIAST BP 80-89 MM HG: CPT | Mod: HCNC,CPTII,S$GLB, | Performed by: PHYSICIAN ASSISTANT

## 2022-12-30 PROCEDURE — 99999 PR PBB SHADOW E&M-EST. PATIENT-LVL III: ICD-10-PCS | Mod: PBBFAC,HCNC,, | Performed by: PHYSICIAN ASSISTANT

## 2022-12-30 PROCEDURE — 99499 RISK ADDL DX/OHS AUDIT: ICD-10-PCS | Mod: HCNC,S$GLB,, | Performed by: PHYSICIAN ASSISTANT

## 2022-12-30 PROCEDURE — 3288F FALL RISK ASSESSMENT DOCD: CPT | Mod: HCNC,CPTII,S$GLB, | Performed by: PHYSICIAN ASSISTANT

## 2022-12-30 PROCEDURE — 99999 PR PBB SHADOW E&M-EST. PATIENT-LVL III: CPT | Mod: PBBFAC,HCNC,, | Performed by: PHYSICIAN ASSISTANT

## 2022-12-30 PROCEDURE — 3074F SYST BP LT 130 MM HG: CPT | Mod: HCNC,CPTII,S$GLB, | Performed by: PHYSICIAN ASSISTANT

## 2022-12-30 PROCEDURE — 3008F PR BODY MASS INDEX (BMI) DOCUMENTED: ICD-10-PCS | Mod: HCNC,CPTII,S$GLB, | Performed by: PHYSICIAN ASSISTANT

## 2022-12-30 PROCEDURE — 99214 OFFICE O/P EST MOD 30 MIN: CPT | Mod: HCNC,S$GLB,, | Performed by: PHYSICIAN ASSISTANT

## 2022-12-30 PROCEDURE — 3079F PR MOST RECENT DIASTOLIC BLOOD PRESSURE 80-89 MM HG: ICD-10-PCS | Mod: HCNC,CPTII,S$GLB, | Performed by: PHYSICIAN ASSISTANT

## 2022-12-30 PROCEDURE — 99214 PR OFFICE/OUTPT VISIT, EST, LEVL IV, 30-39 MIN: ICD-10-PCS | Mod: HCNC,S$GLB,, | Performed by: PHYSICIAN ASSISTANT

## 2022-12-30 PROCEDURE — 3008F BODY MASS INDEX DOCD: CPT | Mod: HCNC,CPTII,S$GLB, | Performed by: PHYSICIAN ASSISTANT

## 2022-12-30 PROCEDURE — 1101F PR PT FALLS ASSESS DOC 0-1 FALLS W/OUT INJ PAST YR: ICD-10-PCS | Mod: HCNC,CPTII,S$GLB, | Performed by: PHYSICIAN ASSISTANT

## 2022-12-30 PROCEDURE — 1101F PT FALLS ASSESS-DOCD LE1/YR: CPT | Mod: HCNC,CPTII,S$GLB, | Performed by: PHYSICIAN ASSISTANT

## 2022-12-30 PROCEDURE — 99499 UNLISTED E&M SERVICE: CPT | Mod: HCNC,S$GLB,, | Performed by: PHYSICIAN ASSISTANT

## 2022-12-30 PROCEDURE — 3074F PR MOST RECENT SYSTOLIC BLOOD PRESSURE < 130 MM HG: ICD-10-PCS | Mod: HCNC,CPTII,S$GLB, | Performed by: PHYSICIAN ASSISTANT

## 2022-12-30 PROCEDURE — 3288F PR FALLS RISK ASSESSMENT DOCUMENTED: ICD-10-PCS | Mod: HCNC,CPTII,S$GLB, | Performed by: PHYSICIAN ASSISTANT

## 2022-12-30 RX ORDER — AMOXICILLIN AND CLAVULANATE POTASSIUM 875; 125 MG/1; MG/1
1 TABLET, FILM COATED ORAL EVERY 12 HOURS
Qty: 20 TABLET | Refills: 0 | Status: SHIPPED | OUTPATIENT
Start: 2022-12-30 | End: 2023-01-09

## 2022-12-30 NOTE — PROGRESS NOTES
"Subjective:       Patient ID: Shanda Marrero is a 72 y.o. female.    Chief Complaint: pneumonia      12/30/22  Here for pneumonia follow up  Repeat Chest CT pneumonia resolved, mild emphysema  Currently with sinus congestion - 3 weeks, runny nose; no cough or SOB  No chest pain or fever    12/8/22  73yo female referred by Shannan Marcus MD for pneumonia  Right upper lobe pneumonia 9/2022 went to ER, treated outpatient with levaquin  Incidental finding of right middle lobe nodules on abdominal CT 7/2022 post cholecystectomy, suspected infectious etiology, has not had f/u chest CT  "2. Multiple nodules in a bronchovascular distribution in the middle lobe measuring up to 8 mm.  Findings are most likely infectious or inflammatory nature.  Clinical correlation is advised.  Follow-up CT scan in 8-12 weeks recommended to re-evaluate after treatment of presumed infectious or inflammatory process."    Quit smoking 14 years ago, 20 pack year history    She denies SOB, chest pain, cough, wheezing, sputum production, weight loss or fever  No history of cancer or lung disease  She does not take any immune suppressants     Immunization History   Administered Date(s) Administered    COVID-19 MRNA, LN-S PF (MODERNA HALF 0.25 ML DOSE) 11/22/2021, 05/24/2022    Influenza (FLUAD) - Quadrivalent - Adjuvanted - PF *Preferred* (65+) 12/09/2020, 10/03/2022    Influenza - High Dose - PF (65 years and older) 11/05/2019    Influenza - Quadrivalent 12/10/2016    Pneumococcal Conjugate - 13 Valent 12/09/2020    Pneumococcal Conjugate - 20 Valent 07/22/2022    Tdap 07/17/2018      Tobacco Use: Medium Risk    Smoking Tobacco Use: Former    Smokeless Tobacco Use: Never    Passive Exposure: Not on file      Past Medical History:   Diagnosis Date    *Atrial flutter     PAROXYSMAL    Acute coronary syndrome     OLD INF MI    Coronary artery disease     Hyperlipidemia     Hypertension       Current Outpatient Medications on File Prior to Visit " "  Medication Sig Dispense Refill    clopidogreL (PLAVIX) 75 mg tablet TAKE 1 TABLET EVERY DAY 90 tablet 3    metoprolol succinate (TOPROL-XL) 25 MG 24 hr tablet TAKE 1 TABLET EVERY DAY 90 tablet 3    simvastatin (ZOCOR) 40 MG tablet Take 1 tablet (40 mg total) by mouth every evening. 90 tablet 2    traZODone (DESYREL) 100 MG tablet Take 100 mg by mouth every evening.       No current facility-administered medications on file prior to visit.        Review of Systems   Constitutional:  Negative for fever, weight loss, appetite change, fatigue and weakness.   HENT:  Negative for postnasal drip, rhinorrhea, sinus pressure, trouble swallowing and congestion.    Respiratory:  Negative for cough, sputum production, choking, chest tightness, shortness of breath, wheezing and dyspnea on extertion.    Cardiovascular:  Negative for chest pain and leg swelling.   Musculoskeletal:  Negative for arthralgias, gait problem and joint swelling.   Gastrointestinal:  Negative for nausea, vomiting and abdominal pain.   Neurological:  Negative for dizziness, weakness and headaches.   All other systems reviewed and are negative.    Objective:       Vitals:    12/30/22 1111   BP: 120/80   Pulse: 75   Resp: 18   SpO2: 98%   Weight: 62.6 kg (138 lb 1.9 oz)   Height: 5' 2" (1.575 m)         Physical Exam   Constitutional: She is oriented to person, place, and time. She appears well-developed and well-nourished. No distress.   HENT:   Head: Normocephalic.   Nose: Nose normal.   Mouth/Throat: Oropharynx is clear and moist.   Cardiovascular: Normal rate and regular rhythm.   Pulmonary/Chest: Effort normal and breath sounds normal. No respiratory distress. She has no wheezes. She has no rhonchi. She has no rales.   Musculoskeletal:         General: No edema.      Cervical back: Normal range of motion and neck supple.   Lymphadenopathy: No supraclavicular adenopathy is present.     She has no cervical adenopathy.   Neurological: She is alert and " oriented to person, place, and time. Gait normal.   Skin: Skin is warm and dry.   Psychiatric: She has a normal mood and affect.   Vitals reviewed.  Personal Diagnostic Review    Chest X Ray 9/2022  EXAMINATION:  XR CHEST AP PORTABLE     CLINICAL HISTORY:  Fever, unspecified     COMPARISON:  09/25/2009     FINDINGS:  The size of the heart is normal.  There has been interval development of a mild amount of alveolar consolidation in the right upper lobe.  The left lung is clear.  There are findings characteristic of a moderate size hiatal hernia.  There is no pneumothorax.  The costophrenic angles are sharp.     Impression:     1. There has been interval development of a mild amount of alveolar consolidation in the right upper lobe.  This is consistent with the patient's history and characteristic of pneumonia.  2. There are findings characteristic of a moderate size hiatal hernia.    Electronically signed by: Radhames Ross MD  Date:                                            09/25/2022  Time:                                           12:27    Abdominal CT 7/2022  FINDINGS:  LUNG BASES: There are numerous nodules in a bronchovascular distribution within the middle lobe, measuring up to 8 mm in size.  Dependent atelectasis in the lung bases.  Left lower lobe calcified granuloma.  Lung bases are otherwise clear.  Negative for pleural or pericardial effusions. The distal esophagus is normal.  Large hiatal hernia.  Coronary artery calcifications.  Calcified left hilar lymph nodes.    EXAMINATION:   CT CHEST WITHOUT CONTRAST 12/22/22     CLINICAL HISTORY:  Pneumonia, unspecified organismPneumonia, unresolved;     TECHNIQUE:  Axial CT images performed through the chest without intravenous contrast.     COMPARISON:  Chest radiograph on 09/25/2022     FINDINGS:  The heart, great vessels, and mediastinal structures are within normal limits. No thoracic adenopathy.     Resolution the right upper lobe infiltrate.  Mild  centrilobular emphysema.  No pleural effusions.     Large retrocardiac paraesophageal hiatal hernia.  Aortic atherosclerosis and severe coronary artery calcifications.     Multilevel thoracic spondylosis.     Impression:     No acute abnormality identified.     Emphysema.     Large hiatal hernia.     Coronary artery calcifications.     All CT scans at this facility are performed  using dose modulation techniques as appropriate to performed exam including the following:  automated exposure control; adjustment of mA and/or kV according to the patients size (this includes techniques or standardized protocols for targeted exams where dose is matched to indication/reason for exam: i.e. extremities or head);  iterative reconstruction technique.        Electronically signed by: Sony Chatman MD  Date:                                            12/22/2022  Time:                                           11:23         Assessment/Plan:       Problem List Items Addressed This Visit          ENT    Other acute sinusitis - Primary    Relevant Medications    amoxicillin-clavulanate 875-125mg (AUGMENTIN) 875-125 mg per tablet       Pulmonary    Other emphysema    Relevant Orders    Spirometry with/without bronchodilator       Cardiac/Vascular    Coronary artery disease, occlusive     Stable, F/u regularly with cardiology            Other    Cigarette nicotine dependence in remission     Quit 14 years ago         Relevant Orders    CT Chest Lung Screening Low Dose     Follow up in about 1 year (around 12/30/2023) for sly and ldct in 1 year.    Discussed diagnosis, its evaluation, treatment and usual course. All questions answered.    Patient verbalized understanding of plan and left in no acute distress    Thank you for the courtesy of participating in the care of this patient    [facility]

## 2023-01-25 DIAGNOSIS — R19.5 POSITIVE FIT (FECAL IMMUNOCHEMICAL TEST): Primary | ICD-10-CM

## 2023-02-07 DIAGNOSIS — Z00.00 ENCOUNTER FOR MEDICARE ANNUAL WELLNESS EXAM: ICD-10-CM

## 2023-02-09 DIAGNOSIS — Z00.00 ENCOUNTER FOR MEDICARE ANNUAL WELLNESS EXAM: ICD-10-CM

## 2023-02-27 ENCOUNTER — HOSPITAL ENCOUNTER (OUTPATIENT)
Dept: PREADMISSION TESTING | Facility: HOSPITAL | Age: 73
Discharge: HOME | End: 2023-02-27
Attending: FAMILY MEDICINE
Payer: MEDICARE

## 2023-02-27 DIAGNOSIS — R19.5 POSITIVE FIT (FECAL IMMUNOCHEMICAL TEST): Primary | ICD-10-CM

## 2023-02-27 RX ORDER — POLYETHYLENE GLYCOL 3350, SODIUM SULFATE ANHYDROUS, SODIUM BICARBONATE, SODIUM CHLORIDE, POTASSIUM CHLORIDE 236; 22.74; 6.74; 5.86; 2.97 G/4L; G/4L; G/4L; G/4L; G/4L
4 POWDER, FOR SOLUTION ORAL ONCE
Qty: 4000 ML | Refills: 0 | Status: SHIPPED | OUTPATIENT
Start: 2023-02-27 | End: 2023-02-27

## 2023-03-28 ENCOUNTER — TELEPHONE (OUTPATIENT)
Dept: ENDOSCOPY | Facility: HOSPITAL | Age: 73
End: 2023-03-28
Payer: MEDICARE

## 2023-03-28 NOTE — TELEPHONE ENCOUNTER
Please advise if patient may hold Plavix 5-7 days prior to endoscopy procedure. Her Colonoscopy is scheduled for Thursday 4/6/23. thanks

## 2023-04-06 ENCOUNTER — ANESTHESIA (OUTPATIENT)
Dept: ENDOSCOPY | Facility: HOSPITAL | Age: 73
End: 2023-04-06
Payer: MEDICARE

## 2023-04-06 ENCOUNTER — HOSPITAL ENCOUNTER (OUTPATIENT)
Facility: HOSPITAL | Age: 73
Discharge: HOME | End: 2023-04-06
Attending: INTERNAL MEDICINE | Admitting: INTERNAL MEDICINE
Payer: MEDICARE

## 2023-04-06 ENCOUNTER — ANESTHESIA EVENT (OUTPATIENT)
Dept: ENDOSCOPY | Facility: HOSPITAL | Age: 73
End: 2023-04-06
Payer: MEDICARE

## 2023-04-06 DIAGNOSIS — Z12.11 COLON CANCER SCREENING: ICD-10-CM

## 2023-04-06 PROBLEM — R19.5 POSITIVE FIT (FECAL IMMUNOCHEMICAL TEST): Status: ACTIVE | Noted: 2023-04-06

## 2023-04-06 PROCEDURE — 45385 PR COLONOSCOPY,REMV LESN,SNARE: ICD-10-PCS | Mod: PT,HCNC,, | Performed by: INTERNAL MEDICINE

## 2023-04-06 PROCEDURE — 88305 TISSUE EXAM BY PATHOLOGIST: ICD-10-PCS | Mod: 26,HCNC,, | Performed by: STUDENT IN AN ORGANIZED HEALTH CARE EDUCATION/TRAINING PROGRAM

## 2023-04-06 PROCEDURE — 45385 COLONOSCOPY W/LESION REMOVAL: CPT | Mod: PT,HCNC | Performed by: INTERNAL MEDICINE

## 2023-04-06 PROCEDURE — 45381 PR COLONOSCPY,FLEX,W/DIR SUBMUC INJECT: ICD-10-PCS | Mod: PT,HCNC,, | Performed by: INTERNAL MEDICINE

## 2023-04-06 PROCEDURE — 37000009 HC ANESTHESIA EA ADD 15 MINS: Mod: HCNC | Performed by: INTERNAL MEDICINE

## 2023-04-06 PROCEDURE — 37000008 HC ANESTHESIA 1ST 15 MINUTES: Mod: HCNC | Performed by: INTERNAL MEDICINE

## 2023-04-06 PROCEDURE — 88305 TISSUE EXAM BY PATHOLOGIST: CPT | Mod: 26,HCNC,, | Performed by: STUDENT IN AN ORGANIZED HEALTH CARE EDUCATION/TRAINING PROGRAM

## 2023-04-06 PROCEDURE — 25000003 PHARM REV CODE 250: Mod: HCNC | Performed by: NURSE ANESTHETIST, CERTIFIED REGISTERED

## 2023-04-06 PROCEDURE — 88305 TISSUE EXAM BY PATHOLOGIST: CPT | Mod: HCNC | Performed by: STUDENT IN AN ORGANIZED HEALTH CARE EDUCATION/TRAINING PROGRAM

## 2023-04-06 PROCEDURE — 27201089 HC SNARE, DISP (ANY): Mod: HCNC | Performed by: INTERNAL MEDICINE

## 2023-04-06 PROCEDURE — 27200997: Mod: HCNC | Performed by: INTERNAL MEDICINE

## 2023-04-06 PROCEDURE — 45385 COLONOSCOPY W/LESION REMOVAL: CPT | Mod: PT,HCNC,, | Performed by: INTERNAL MEDICINE

## 2023-04-06 PROCEDURE — 27202363 HC INJECTION AGENT, SUBMUCOSAL, ANY: Mod: HCNC | Performed by: INTERNAL MEDICINE

## 2023-04-06 PROCEDURE — 45381 COLONOSCOPY SUBMUCOUS NJX: CPT | Mod: PT,HCNC,, | Performed by: INTERNAL MEDICINE

## 2023-04-06 PROCEDURE — 45381 COLONOSCOPY SUBMUCOUS NJX: CPT | Mod: PT,HCNC | Performed by: INTERNAL MEDICINE

## 2023-04-06 PROCEDURE — 63600175 PHARM REV CODE 636 W HCPCS: Mod: HCNC | Performed by: NURSE ANESTHETIST, CERTIFIED REGISTERED

## 2023-04-06 PROCEDURE — 27201028 HC NEEDLE, SCLERO: Mod: HCNC | Performed by: INTERNAL MEDICINE

## 2023-04-06 RX ORDER — PROPOFOL 10 MG/ML
VIAL (ML) INTRAVENOUS
Status: DISCONTINUED | OUTPATIENT
Start: 2023-04-06 | End: 2023-04-06

## 2023-04-06 RX ORDER — LIDOCAINE HYDROCHLORIDE 10 MG/ML
INJECTION, SOLUTION EPIDURAL; INFILTRATION; INTRACAUDAL; PERINEURAL
Status: DISCONTINUED | OUTPATIENT
Start: 2023-04-06 | End: 2023-04-06

## 2023-04-06 RX ADMIN — SODIUM CHLORIDE, SODIUM LACTATE, POTASSIUM CHLORIDE, AND CALCIUM CHLORIDE: .6; .31; .03; .02 INJECTION, SOLUTION INTRAVENOUS at 12:04

## 2023-04-06 RX ADMIN — PROPOFOL 80 MG: 10 INJECTION, EMULSION INTRAVENOUS at 12:04

## 2023-04-06 RX ADMIN — PROPOFOL 20 MG: 10 INJECTION, EMULSION INTRAVENOUS at 12:04

## 2023-04-06 RX ADMIN — PROPOFOL 30 MG: 10 INJECTION, EMULSION INTRAVENOUS at 12:04

## 2023-04-06 RX ADMIN — LIDOCAINE HYDROCHLORIDE 50 MG: 10 INJECTION, SOLUTION EPIDURAL; INFILTRATION; INTRACAUDAL; PERINEURAL at 12:04

## 2023-04-06 NOTE — TRANSFER OF CARE
"Anesthesia Transfer of Care Note    Patient: Shanda Marrero    Procedure(s) Performed: Procedure(s) (LRB):  COLONOSCOPY (N/A)    Patient location: PACU    Anesthesia Type: MAC    Transport from OR: Transported from OR on room air with adequate spontaneous ventilation    Post pain: adequate analgesia    Post assessment: no apparent anesthetic complications    Post vital signs: stable    Level of consciousness: awake    Nausea/Vomiting: no nausea/vomiting    Complications: none    Transfer of care protocol was followed      Last vitals:   Visit Vitals  BP (!) 111/59 (BP Location: Left arm, Patient Position: Lying)   Pulse 64   Temp 36.9 °C (98.5 °F) (Tympanic)   Resp 16   Ht 5' 2" (1.575 m)   Wt 59.9 kg (132 lb)   SpO2 97%   Breastfeeding No   BMI 24.14 kg/m²     "

## 2023-04-06 NOTE — ANESTHESIA POSTPROCEDURE EVALUATION
Anesthesia Post Evaluation    Patient: Shanda Marrero    Procedure(s) Performed: Procedure(s) (LRB):  COLONOSCOPY (N/A)    Final Anesthesia Type: MAC      Patient location during evaluation: PACU  Patient participation: Yes- Able to Participate  Level of consciousness: awake and alert  Post-procedure vital signs: reviewed and stable  Pain management: adequate  Airway patency: patent    PONV status at discharge: No PONV  Anesthetic complications: no      Cardiovascular status: blood pressure returned to baseline  Respiratory status: unassisted  Hydration status: euvolemic  Follow-up not needed.          Vitals Value Taken Time   /66 04/06/23 1251   Temp 98 04/06/23 1251   Pulse 66 04/06/23 1251   Resp 12 04/06/23 1251   SpO2 98 04/06/23 1251         No case tracking events are documented in the log.      Pain/Gela Score: No data recorded

## 2023-04-06 NOTE — PROVATION PATIENT INSTRUCTIONS
Discharge Summary/Instructions after an Endoscopic Procedure  Patient Name: Shanda Marrero  Patient MRN: [id number]  Patient YOB: 1950  Thursday, April 6, 2023 Jef Fajardo MD  Dear patient,  As a result of recent federal legislation (The Federal Cures Act), you may   receive lab or pathology results from your procedure in your [facility]   account before your physician is able to contact you. Your physician or   their representative will relay the results to you with their   recommendations at their soonest availability.  Thank you,  RESTRICTIONS:  During your procedure today, you received medications for sedation.  These   medications may affect your judgment, balance and coordination.  Therefore,   for 24 hours, you have the following restrictions:   - DO NOT drive a car, operate machinery, make legal/financial decisions,   sign important papers or drink alcohol.    ACTIVITY:  Today: no heavy lifting, straining or running due to procedural   sedation/anesthesia.  The following day: return to full activity including work.  DIET:  Eat and drink normally unless instructed otherwise.     TREATMENT FOR COMMON SIDE EFFECTS:  - Mild abdominal pain, nausea, belching, bloating or excessive gas:  rest,   eat lightly and use a heating pad.  - Sore Throat: treat with throat lozenges and/or gargle with warm salt   water.  - Because air was used during the procedure, expelling large amounts of air   from your rectum or belching is normal.  - If a bowel prep was taken, you may not have a bowel movement for 1-3 days.    This is normal.  SYMPTOMS TO WATCH FOR AND REPORT TO YOUR PHYSICIAN:  1. Abdominal pain or bloating, other than gas cramps.  2. Chest pain.  3. Back pain.  4. Signs of infection such as: chills or fever occurring within 24 hours   after the procedure.  5. Rectal bleeding, which would show as bright red, maroon, or black stools.   (A tablespoon of blood from the rectum is not serious, especially  if   hemorrhoids are present.)  6. Vomiting.  7. Weakness or dizziness.  GO DIRECTLY TO THE NEAREST EMERGENCY ROOM IF YOU HAVE ANY OF THE FOLLOWING:      Difficulty breathing              Chills and/or fever over 101 F   Persistent vomiting and/or vomiting blood   Severe abdominal pain   Severe chest pain   Black, tarry stools   Bleeding- more than one tablespoon   Any other symptom or condition that you feel may need urgent attention  Your doctor recommends these additional instructions:  If any biopsies were taken, your doctors clinic will contact you in 1 to 2   weeks with any results.  - Discharge patient to home (via wheelchair).   - Resume previous diet.   - Continue present medications.   - Await pathology results.   - Repeat colonoscopy in 3 years for surveillance.   - Return to referring physician as previously scheduled.   - Patient has a contact number available for emergencies.  The signs and   symptoms of potential delayed complications were discussed with the   patient.  Return to normal activities tomorrow.  Written discharge   instructions were provided to the patient.  For questions, problems or results please call your physician Jef Fajardo MD at Work:  [phone]  If you have any questions about the above instructions, call the GI   department at [phone] or call the endoscopy unit at [phone]   from 7am until 3 pm.  [facility] [place], EMERGENCY ROOM PHONE NUMBER:   [phone]  IF A COMPLICATION OR EMERGENCY SITUATION ARISES AND YOU ARE UNABLE TO REACH   YOUR PHYSICIAN - GO DIRECTLY TO THE EMERGENCY ROOM.  I have read or have had read to me these discharge instructions for my   procedure and have received a written copy.  I understand these   instructions and will follow-up with my physician if I have any questions.     __________________________________       _____________________________________  Nurse Signature                                           Patient/Designated   Responsible Party Signature  MD Jef Brizuela MD  4/6/2023 12:49:48 PM  This report has been verified and signed electronically.  Dear patient,  As a result of recent federal legislation (The Federal Cures Act), you may   receive lab or pathology results from your procedure in your [facility]   account before your physician is able to contact you. Your physician or   their representative will relay the results to you with their   recommendations at their soonest availability.  Thank you,  PROVATION

## 2023-04-06 NOTE — ANESTHESIA PREPROCEDURE EVALUATION
04/06/2023  Shanda Marrero is a 72 y.o., female.      Pre-op Assessment    I have reviewed the Patient Summary Reports.     I have reviewed the Nursing Notes. I have reviewed the NPO Status.   I have reviewed the Medications.     Review of Systems  Anesthesia Hx:  Denies Family Hx of Anesthesia complications.   Denies Personal Hx of Anesthesia complications.   Cardiovascular:   Hypertension CAD  CABG/stent Dysrhythmias  Angina hyperlipidemia    Pulmonary:   COPD, mild        Physical Exam  General: Well nourished, Cooperative, Alert and Oriented    Airway:  Mallampati: II   Mouth Opening: Normal  TM Distance: Normal  Tongue: Normal  Neck ROM: Normal ROM    Dental:  Intact        Anesthesia Plan  Type of Anesthesia, risks & benefits discussed:    Anesthesia Type: MAC  Intra-op Monitoring Plan: Standard ASA Monitors  Post Op Pain Control Plan: IV/PO Opioids PRN  Induction:  IV  Informed Consent: Informed consent signed with the Patient and all parties understand the risks and agree with anesthesia plan.  All questions answered.   ASA Score: 3    Ready For Surgery From Anesthesia Perspective.     .

## 2023-04-06 NOTE — ANESTHESIA RELEASE NOTE
"Anesthesia Release from PACU Note    Patient: Shanda Marrero    Procedure(s) Performed: Procedure(s) (LRB):  COLONOSCOPY (N/A)    Anesthesia type: MAC    Post pain: Adequate analgesia    Post assessment: no apparent anesthetic complications    Last Vitals:   Visit Vitals  BP (!) 111/59 (BP Location: Left arm, Patient Position: Lying)   Pulse 64   Temp 36.9 °C (98.5 °F) (Tympanic)   Resp 16   Ht 5' 2" (1.575 m)   Wt 59.9 kg (132 lb)   SpO2 97%   Breastfeeding No   BMI 24.14 kg/m²       Post vital signs: stable    Level of consciousness: awake    Nausea/Vomiting: no nausea/no vomiting    Complications: none    Airway Patency: patent    Respiratory: unassisted    Cardiovascular: stable and blood pressure at baseline    Hydration: euvolemic     "

## 2023-04-06 NOTE — H&P
Endoscopy History and Physical    PCP - Jean Vasques MD  Referring Physician - Jean Vasques MD  [address] [place] [address]      ASA - per anesthesia  Mallampati - per anesthesia  History of Anesthesia problems - no  Family history Anesthesia problems -  no   Plan of anesthesia - General    HPI  72 y.o. female    Planned Procedure: Colonoscopy  Diagnosis: Positive FIT test  Chief Complaint: Same as above        ROS:  Constitutional: No fevers, chills, No weight loss  CV: No chest pain  Pulm: No cough, No shortness of breath  GI: see HPI    Medical History:  has a past medical history of *Atrial flutter, Acute coronary syndrome, Coronary artery disease, Hyperlipidemia, and Hypertension.    Surgical History:  has a past surgical history that includes Coronary angioplasty (09/2009); Laparoscopic cholecystectomy (N/A, 09/24/2021); Gallbladder surgery (10/2021); Cataract extraction w/ intraocular lens implant (Left, 03/03/2022); Cataract extraction (Left, 03/03/2022); Cataract extraction (Right, 03/24/2022); ERCP (N/A, 07/18/2022); and ERCP (N/A, 09/20/2022).    Family History: family history includes Breast cancer in her paternal aunt; Heart attack in her father; Heart disease in her father..    Social History:  reports that she quit smoking about 13 years ago. Her smoking use included cigarettes. She has never used smokeless tobacco. She reports current alcohol use. She reports that she does not use drugs.    Review of patient's allergies indicates:   Allergen Reactions    No known allergies        Medications:   Medications Prior to Admission   Medication Sig Dispense Refill Last Dose    metoprolol succinate (TOPROL-XL) 25 MG 24 hr tablet TAKE 1 TABLET EVERY DAY 90 tablet 3 4/6/2023    simvastatin (ZOCOR) 40 MG tablet TAKE 1 TABLET EVERY EVENING 90 tablet 2 4/5/2023    traZODone (DESYREL) 100 MG tablet Take 100 mg by mouth every evening.   4/5/2023    clopidogreL (PLAVIX) 75 mg tablet TAKE 1  TABLET EVERY DAY 90 tablet 3 3/31/2023       Physical Exam:    Vital Signs:   Vitals:    04/06/23 1130   BP: (!) 111/59   Pulse: 64   Resp: 16   Temp: 98.5 °F (36.9 °C)       General Appearance: Well appearing in no acute distress  Abdomen: Soft, non tender, non distended with normal bowel sounds, no masses    Labs:  Lab Results   Component Value Date    WBC 10.72 09/25/2022    HGB 12.7 09/25/2022    HCT 38.5 09/25/2022     09/25/2022    CHOL 178 03/31/2022    TRIG 70 03/31/2022    HDL 66 03/31/2022    ALT 9 (L) 09/25/2022    AST 16 09/25/2022     09/25/2022    K 3.9 09/25/2022     09/25/2022    CREATININE 0.7 09/25/2022    BUN 9 09/25/2022    CO2 25 09/25/2022    TSH 1.612 12/11/2020    INR 1.0 10/16/2009       I have explained the risks and benefits of this endoscopic procedure to the patient including but not limited to bleeding, inflammation, infection, perforation, and death.    SEDATION PLAN: per anesthesia       History reviewed, vital signs satisfactory, cardiopulmonary status satisfactory, sedation options, risks and plans have been discussed with the patient  All their questions were answered and the patient agrees to the sedation procedures as planned and the patient is deemed an appropriate candidate for the sedation as planned.     The risks, benefits and alternatives of the procedure were discussed with the patient in detail. This discussion was had in the presence of endoscopy staff. The risks include, risks of adverse reaction to sedation requiring the use of reversal agents, bleeding requiring blood transfusion, perforation requiring surgical intervention and technical failure. Other risks include aspiration leading to respiratory distress and respiratory failure resulting in endotracheal intubation and mechanical ventilation including death. If anesthesia is being utilized for this procedure, it is up to the anesthesiologist to determine airway safety including elective  endotracheal intubation. Questions were answered, they agree to proceed. There was no language barriers.       Procedure explained to patient, informed consent obtained and placed in chart.       Jef Fajardo MD

## 2023-04-06 NOTE — DISCHARGE SUMMARY
[facility] - Endoscopy (Hospital)  Discharge Note  Short Stay    Procedure(s) (LRB):  COLONOSCOPY (N/A)      OUTCOME: Patient tolerated treatment/procedure well without complication and is now ready for discharge.    DISPOSITION: Home or Self Care    FINAL DIAGNOSIS:  <principal problem not specified>    FOLLOWUP: With primary care provider    DISCHARGE INSTRUCTIONS:  No discharge procedures on file.      Clinical Reference Documents Added to Patient Instructions         Document    DIVERTICULOSIS (ENGLISH)            TIME SPENT ON DISCHARGE: 20   minutes

## 2023-04-10 VITALS
HEIGHT: 62 IN | BODY MASS INDEX: 24.29 KG/M2 | RESPIRATION RATE: 18 BRPM | SYSTOLIC BLOOD PRESSURE: 128 MMHG | OXYGEN SATURATION: 100 % | WEIGHT: 132 LBS | TEMPERATURE: 98 F | DIASTOLIC BLOOD PRESSURE: 61 MMHG | HEART RATE: 68 BPM

## 2023-04-13 LAB
FINAL PATHOLOGIC DIAGNOSIS: NORMAL
Lab: NORMAL

## 2023-06-08 ENCOUNTER — PES CALL (OUTPATIENT)
Dept: ADMINISTRATIVE | Facility: CLINIC | Age: 73
End: 2023-06-08
Payer: MEDICARE

## 2023-07-12 ENCOUNTER — OFFICE VISIT (OUTPATIENT)
Dept: CARDIOLOGY | Facility: CLINIC | Age: 73
End: 2023-07-12
Payer: MEDICARE

## 2023-07-12 VITALS
WEIGHT: 137.81 LBS | HEART RATE: 58 BPM | OXYGEN SATURATION: 98 % | HEIGHT: 62 IN | BODY MASS INDEX: 25.36 KG/M2 | DIASTOLIC BLOOD PRESSURE: 68 MMHG | SYSTOLIC BLOOD PRESSURE: 124 MMHG

## 2023-07-12 DIAGNOSIS — I70.0 AORTIC ATHEROSCLEROSIS: Primary | ICD-10-CM

## 2023-07-12 DIAGNOSIS — F17.211 CIGARETTE NICOTINE DEPENDENCE IN REMISSION: ICD-10-CM

## 2023-07-12 DIAGNOSIS — I25.2 HISTORY OF HEART ATTACK: Chronic | ICD-10-CM

## 2023-07-12 DIAGNOSIS — I25.10 CORONARY ARTERY DISEASE, OCCLUSIVE: ICD-10-CM

## 2023-07-12 DIAGNOSIS — E78.5 DYSLIPIDEMIA: Chronic | ICD-10-CM

## 2023-07-12 DIAGNOSIS — J43.8 OTHER EMPHYSEMA: ICD-10-CM

## 2023-07-12 DIAGNOSIS — Z98.61 HISTORY OF CORONARY ANGIOPLASTY: Chronic | ICD-10-CM

## 2023-07-12 PROCEDURE — 3288F PR FALLS RISK ASSESSMENT DOCUMENTED: ICD-10-PCS | Mod: HCNC,CPTII,S$GLB, | Performed by: INTERNAL MEDICINE

## 2023-07-12 PROCEDURE — 1159F MED LIST DOCD IN RCRD: CPT | Mod: HCNC,CPTII,S$GLB, | Performed by: INTERNAL MEDICINE

## 2023-07-12 PROCEDURE — 3074F PR MOST RECENT SYSTOLIC BLOOD PRESSURE < 130 MM HG: ICD-10-PCS | Mod: HCNC,CPTII,S$GLB, | Performed by: INTERNAL MEDICINE

## 2023-07-12 PROCEDURE — 3074F SYST BP LT 130 MM HG: CPT | Mod: HCNC,CPTII,S$GLB, | Performed by: INTERNAL MEDICINE

## 2023-07-12 PROCEDURE — 1101F PR PT FALLS ASSESS DOC 0-1 FALLS W/OUT INJ PAST YR: ICD-10-PCS | Mod: HCNC,CPTII,S$GLB, | Performed by: INTERNAL MEDICINE

## 2023-07-12 PROCEDURE — 1126F PR PAIN SEVERITY QUANTIFIED, NO PAIN PRESENT: ICD-10-PCS | Mod: HCNC,CPTII,S$GLB, | Performed by: INTERNAL MEDICINE

## 2023-07-12 PROCEDURE — 99999 PR PBB SHADOW E&M-EST. PATIENT-LVL III: CPT | Mod: PBBFAC,HCNC,, | Performed by: INTERNAL MEDICINE

## 2023-07-12 PROCEDURE — 99214 OFFICE O/P EST MOD 30 MIN: CPT | Mod: HCNC,S$GLB,, | Performed by: INTERNAL MEDICINE

## 2023-07-12 PROCEDURE — 3008F BODY MASS INDEX DOCD: CPT | Mod: HCNC,CPTII,S$GLB, | Performed by: INTERNAL MEDICINE

## 2023-07-12 PROCEDURE — 99214 PR OFFICE/OUTPT VISIT, EST, LEVL IV, 30-39 MIN: ICD-10-PCS | Mod: HCNC,S$GLB,, | Performed by: INTERNAL MEDICINE

## 2023-07-12 PROCEDURE — 1126F AMNT PAIN NOTED NONE PRSNT: CPT | Mod: HCNC,CPTII,S$GLB, | Performed by: INTERNAL MEDICINE

## 2023-07-12 PROCEDURE — 3008F PR BODY MASS INDEX (BMI) DOCUMENTED: ICD-10-PCS | Mod: HCNC,CPTII,S$GLB, | Performed by: INTERNAL MEDICINE

## 2023-07-12 PROCEDURE — 99999 PR PBB SHADOW E&M-EST. PATIENT-LVL III: ICD-10-PCS | Mod: PBBFAC,HCNC,, | Performed by: INTERNAL MEDICINE

## 2023-07-12 PROCEDURE — 1159F PR MEDICATION LIST DOCUMENTED IN MEDICAL RECORD: ICD-10-PCS | Mod: HCNC,CPTII,S$GLB, | Performed by: INTERNAL MEDICINE

## 2023-07-12 PROCEDURE — 3078F DIAST BP <80 MM HG: CPT | Mod: HCNC,CPTII,S$GLB, | Performed by: INTERNAL MEDICINE

## 2023-07-12 PROCEDURE — 1101F PT FALLS ASSESS-DOCD LE1/YR: CPT | Mod: HCNC,CPTII,S$GLB, | Performed by: INTERNAL MEDICINE

## 2023-07-12 PROCEDURE — 3078F PR MOST RECENT DIASTOLIC BLOOD PRESSURE < 80 MM HG: ICD-10-PCS | Mod: HCNC,CPTII,S$GLB, | Performed by: INTERNAL MEDICINE

## 2023-07-12 PROCEDURE — 3288F FALL RISK ASSESSMENT DOCD: CPT | Mod: HCNC,CPTII,S$GLB, | Performed by: INTERNAL MEDICINE

## 2023-07-12 NOTE — PROGRESS NOTES
Subjective:   Patient ID:  Shanda Marrero is a 72 y.o. female who presents for evaluation of No chief complaint on file.        HPI  July 12, 2023.    Comes in for an overdue follow-up.    She is doing well.  She swims and exercises.  Brought out he limitations.    Denies any chest pain, no dyspnea.  No swelling.  Palpitations.  Syncope or presyncope.    Compliant with her medications.    Normal nuclear stress test 2022.    EF borderline 45%.      10.10.2022  Comes in for six-month follow-up.    She is doing well.  Denies any complaints.  No chest pain, no dyspnea.  No lower extremity swelling.    She was recently treated for pneumonia in September.  On a prior CT scan she had some nodules in the lower lobes and calcified hilar lymph nodes.  No syncope or presyncope.    No palpitations.    Compliant with her medications.    Doing well overall.  Her nuclear stress test was normal.      3.2022  70 yo female, ex smoker, had inferior stemi in 2009 s/p rca stenting, followed by elective stenting of her circ, cath films under imaging   She used to see Dr León  Comes in for a routine 6 months follow up   Had her lap cholecystectomy last year , no issues   States only ISMPSON NYHA 1 , with walking up the stairs after 1 flight   But denies any chest pressure, chest pain or discomfort   Compliant with meds   No recent hx of ed visits or hospitalizations for acs or adhf  No orthopnea or pnd  No hx of palpitations. No exertional dizziness or syncope  No hx of dvt or pe. No hx of edema or intermittent claudication  No focal cns symptoms or signs to suggest tia or stroke  No hx of abnormal bleeding  Compliant with meds   Mention of aflutter in chart , but patient denies, not on any available ekg       Past Medical History:   Diagnosis Date    *Atrial flutter     PAROXYSMAL    Acute coronary syndrome     OLD INF MI    Coronary artery disease     Hyperlipidemia     Hypertension        Past Surgical History:   Procedure Laterality  Date    CATARACT EXTRACTION Left 2022    TORIC    CATARACT EXTRACTION Right 2022    Toric    CATARACT EXTRACTION W/ INTRAOCULAR LENS IMPLANT Left 2022    Ora/ Toric    COLONOSCOPY N/A 2023    Procedure: COLONOSCOPY;  Surgeon: Jef Fajardo MD;  Location: [facility] ENDO;  Service: Endoscopy;  Laterality: N/A;    CORONARY ANGIOPLASTY  2009    RCA AND LCX- LIAS    ERCP N/A 2022    Procedure: ERCP (ENDOSCOPIC RETROGRADE CHOLANGIOPANCREATOGRAPHY);  Surgeon: Nohelia Altamirano MD;  Location: [facility] ENDO;  Service: Endoscopy;  Laterality: N/A;    ERCP N/A 2022    Procedure: ERCP (ENDOSCOPIC RETROGRADE CHOLANGIOPANCREATOGRAPHY);  Surgeon: Nohelia Altamirano MD;  Location: [facility];  Service: Endoscopy;  Laterality: N/A;    GALLBLADDER SURGERY  10/2021    LAPAROSCOPIC CHOLECYSTECTOMY N/A 2021    Procedure: CHOLECYSTECTOMY, LAPAROSCOPIC;  Surgeon: Balwinder Castrejon MD;  Location: [facility];  Service: General;  Laterality: N/A;       Social History     Tobacco Use    Smoking status: Former     Types: Cigarettes     Quit date: 2009     Years since quittin.8    Smokeless tobacco: Never   Substance Use Topics    Alcohol use: Yes     Comment: occassionally    Drug use: No       Family History   Problem Relation Age of Onset    Heart attack Father     Heart disease Father     Breast cancer Paternal Aunt        Review of Systems   Cardiovascular:  Negative for chest pain, dyspnea on exertion, palpitations and syncope.   Genitourinary: Negative.    Neurological: Negative.      Current Outpatient Medications on File Prior to Visit   Medication Sig    clopidogreL (PLAVIX) 75 mg tablet TAKE 1 TABLET EVERY DAY    metoprolol succinate (TOPROL-XL) 25 MG 24 hr tablet TAKE 1 TABLET EVERY DAY    simvastatin (ZOCOR) 40 MG tablet TAKE 1 TABLET EVERY EVENING    traZODone (DESYREL) 100 MG tablet Take 100 mg by mouth every evening.     No current facility-administered medications on  file prior to visit.       Objective:   Objective:  Wt Readings from Last 3 Encounters:   07/12/23 62.5 kg (137 lb 12.6 oz)   04/06/23 59.9 kg (132 lb)   12/30/22 62.6 kg (138 lb 1.9 oz)     Temp Readings from Last 3 Encounters:   04/06/23 97.5 °F (36.4 °C) (Temporal)   10/03/22 97.7 °F (36.5 °C) (Temporal)   09/25/22 98.7 °F (37.1 °C) (Oral)     BP Readings from Last 3 Encounters:   07/12/23 124/68   04/06/23 128/61   12/30/22 120/80     Pulse Readings from Last 3 Encounters:   07/12/23 (!) 58   04/06/23 68   12/30/22 75       Physical Exam  Vitals reviewed.   Constitutional:       Appearance: She is well-developed.   Neck:      Vascular: No carotid bruit.   Cardiovascular:      Rate and Rhythm: Normal rate and regular rhythm.      Pulses: Intact distal pulses.      Heart sounds: Normal heart sounds. No murmur heard.  Pulmonary:      Breath sounds: Normal breath sounds.   Neurological:      Mental Status: She is oriented to person, place, and time.       Lab Results   Component Value Date    CHOL 178 03/31/2022    CHOL 194 12/11/2020    CHOL 181 10/19/2017     Lab Results   Component Value Date    HDL 66 03/31/2022    HDL 53 12/11/2020    HDL 60 10/19/2017     Lab Results   Component Value Date    LDLCALC 98.0 03/31/2022    LDLCALC 118.4 12/11/2020    LDLCALC 94.6 10/19/2017     Lab Results   Component Value Date    TRIG 70 03/31/2022    TRIG 113 12/11/2020    TRIG 132 10/19/2017     Lab Results   Component Value Date    CHOLHDL 37.1 03/31/2022    CHOLHDL 27.3 12/11/2020    CHOLHDL 33.1 10/19/2017       Chemistry        Component Value Date/Time     09/25/2022 1347    K 3.9 09/25/2022 1347     09/25/2022 1347    CO2 25 09/25/2022 1347    BUN 9 09/25/2022 1347    CREATININE 0.7 09/25/2022 1347     09/25/2022 1347        Component Value Date/Time    CALCIUM 8.7 09/25/2022 1347    ALKPHOS 77 09/25/2022 1347    AST 16 09/25/2022 1347    ALT 9 (L) 09/25/2022 1347    BILITOT 1.0 09/25/2022 1347     ESTGFRAFRICA >60.0 07/22/2022 1206    EGFRNONAA >60.0 07/22/2022 1206          Lab Results   Component Value Date    TSH 1.612 12/11/2020     Lab Results   Component Value Date    INR 1.0 10/16/2009    INR 1.0 09/21/2009     Lab Results   Component Value Date    WBC 10.72 09/25/2022    HGB 12.7 09/25/2022    HCT 38.5 09/25/2022    MCV 85 09/25/2022     09/25/2022     BNP  @LABRCNTIP(BNP,BNPTRIAGEBLO)@  CrCl cannot be calculated (Patient's most recent lab result is older than the maximum 7 days allowed.).     Imaging:  ======  No results found for this or any previous visit.    No results found for this or any previous visit.    No results found for this or any previous visit.    No results found for this or any previous visit.    No results found for this or any previous visit.    No valid procedures specified.    Diagnostic Results:  ECG: Reviewed  Normal sinus rhythm  Low voltage QRS  Nonspecific ST and T wave abnormality  Abnormal ECG  When compared with ECG of 03-SEP-2020 14:57,  No significant change was found  Confirmed by MD ABILIO, LEE ANN ([id number]) on 9/3/2021 9:45:47     Results for orders placed during the hospital encounter of 04/21/22    Echo    Interpretation Summary  · The left ventricle is normal in size with mildly decreased systolic function.  · Grade I left ventricular diastolic dysfunction.  · The estimated PA systolic pressure is 26 mmHg.  · Normal central venous pressure (3 mmHg).  · Mild tricuspid regurgitation.  · The estimated ejection fraction is 45-50%.  · There are segmental left ventricular wall motion abnormalities.  · There is mild aortic valve stenosis.  · Aortic valve area is 1.29 cm2; peak velocity is 1.6 m/s; mean gradient is 6 mmHg.  · Normal right ventricular size with normal right ventricular systolic function.    No results found for this or any previous visit.  Results for orders placed during the hospital encounter of 06/13/22    Nuclear Stress - Cardiology  Interpreted    Interpretation Summary    Normal myocardial perfusion scan. There is no evidence of myocardial ischemia or infarction.    The gated perfusion images showed an ejection fraction of 65% at rest. The gated perfusion images showed an ejection fraction of 73% post stress. Normal ejection fraction is greater than 59%.    The EKG portion of this study is negative for ischemia.    During stress, rare PACs are noted. , During stress, rare PVCs are noted.      The 10-year ASCVD risk score (Carolyn STAFFORD, et al., 2019) is: 11.1%    Values used to calculate the score:      Age: 72 years      Sex: Female      Is Non- : No      Diabetic: No      Tobacco smoker: No      Systolic Blood Pressure: 124 mmHg      Is BP treated: Yes      HDL Cholesterol: 66 mg/dL      Total Cholesterol: 178 mg/dL    Assessment and Plan:   Aortic atherosclerosis    Other emphysema    Coronary artery disease, occlusive    History of heart attack    Dyslipidemia    History of coronary angioplasty    Cigarette nicotine dependence in remission      Continue with beta-blocker, statin and Plavix.  Reviewed all tests and above medical conditions with patient in detail and formulated treatment plan.  Risk factor modification discussed.   Cardiac low salt diet discussed.  Maintaining healthy weight and weight loss goals were discussed in clinic.  Reviewed angiogram filmsf rom 2009   Reviewed echocardiogram.  Recent normal nuclear stress test. 2022  Follow up in 6 Months

## 2023-09-11 ENCOUNTER — PATIENT MESSAGE (OUTPATIENT)
Dept: ADMINISTRATIVE | Facility: HOSPITAL | Age: 73
End: 2023-09-11
Payer: MEDICARE

## 2023-10-26 ENCOUNTER — PATIENT OUTREACH (OUTPATIENT)
Dept: ADMINISTRATIVE | Facility: HOSPITAL | Age: 73
End: 2023-10-26
Payer: MEDICARE

## 2023-11-02 ENCOUNTER — OFFICE VISIT (OUTPATIENT)
Dept: OPHTHALMOLOGY | Facility: CLINIC | Age: 73
End: 2023-11-02
Payer: MEDICARE

## 2023-11-02 DIAGNOSIS — H26.493 PCO (POSTERIOR CAPSULAR OPACIFICATION), BILATERAL: ICD-10-CM

## 2023-11-02 DIAGNOSIS — Z96.1 PSEUDOPHAKIA OF BOTH EYES: Primary | ICD-10-CM

## 2023-11-02 PROCEDURE — 92014 PR EYE EXAM, EST PATIENT,COMPREHESV: ICD-10-PCS | Mod: HCNC,S$GLB,, | Performed by: OPTOMETRIST

## 2023-11-02 PROCEDURE — 92014 COMPRE OPH EXAM EST PT 1/>: CPT | Mod: HCNC,S$GLB,, | Performed by: OPTOMETRIST

## 2023-11-02 PROCEDURE — 99999 PR PBB SHADOW E&M-EST. PATIENT-LVL II: CPT | Mod: PBBFAC,HCNC,, | Performed by: OPTOMETRIST

## 2023-11-02 PROCEDURE — 99999 PR PBB SHADOW E&M-EST. PATIENT-LVL II: ICD-10-PCS | Mod: PBBFAC,HCNC,, | Performed by: OPTOMETRIST

## 2023-11-02 PROCEDURE — 99212 OFFICE O/P EST SF 10 MIN: CPT | Mod: PBBFAC,HCNC | Performed by: OPTOMETRIST

## 2023-11-02 NOTE — PROGRESS NOTES
HPI     Annual Exam            Comments: RTC 1 yr for dilated eye exam  Vision changes since last eye exam?: no     Any eye pain today: no    Other ocular symptoms: flashes sometimes, but not daily    Interested in contact lens fitting today? no                      Comments    States that her vision is great that she has not complaints thus far.     1. PHACO OS with ORA, + 16.0 SN6AT4 NRR on Ora (near) 3/3/22  CDE=13.93  PHACO OD 3/24/2022 TORIC / SN6AT6 12.5/ CDE: 10.47    SCLS wearer   Wore multi- focal in the past and then Mono-vision    OD DOMINANT  **PLAVIX** x 13 yrs      DROP LESS            Last edited by Lee Ann Farley on 11/2/2023  3:00 PM.            Assessment /Plan     For exam results, see Encounter Report.    Pseudophakia of both eyes  PCO (posterior capsular opacification), bilateral  Stable OU  Mild PCO OU, YAG not yet indicated  Monitor 12 months      RTC 1 yr for dilated eye exam or PRN if any problems.   Discussed above and answered questions.

## 2023-12-29 ENCOUNTER — TELEPHONE (OUTPATIENT)
Dept: PULMONOLOGY | Facility: CLINIC | Age: 73
End: 2023-12-29
Payer: MEDICARE

## 2023-12-29 ENCOUNTER — HOSPITAL ENCOUNTER (OUTPATIENT)
Dept: RADIOLOGY | Facility: HOSPITAL | Age: 73
Discharge: HOME | End: 2023-12-29
Attending: PHYSICIAN ASSISTANT
Payer: MEDICARE

## 2023-12-29 DIAGNOSIS — F17.211 CIGARETTE NICOTINE DEPENDENCE IN REMISSION: ICD-10-CM

## 2023-12-29 DIAGNOSIS — J18.9 PNEUMONIA OF BOTH LUNGS DUE TO INFECTIOUS ORGANISM, UNSPECIFIED PART OF LUNG: Primary | ICD-10-CM

## 2023-12-29 PROCEDURE — 71271 CT CHEST LUNG SCREENING LOW DOSE: ICD-10-PCS | Mod: 26,HCNC,, | Performed by: RADIOLOGY

## 2023-12-29 PROCEDURE — 71271 CT THORAX LUNG CANCER SCR C-: CPT | Mod: 26,HCNC,, | Performed by: RADIOLOGY

## 2023-12-29 PROCEDURE — 71271 CT THORAX LUNG CANCER SCR C-: CPT | Mod: TC,HCNC

## 2023-12-29 RX ORDER — LEVOFLOXACIN 500 MG/1
500 TABLET, FILM COATED ORAL DAILY
Qty: 10 TABLET | Refills: 0 | Status: SHIPPED | OUTPATIENT
Start: 2023-12-29

## 2024-02-12 ENCOUNTER — TELEPHONE (OUTPATIENT)
Dept: FAMILY MEDICINE | Facility: CLINIC | Age: 74
End: 2024-02-12
Payer: MEDICARE

## 2024-02-12 NOTE — TELEPHONE ENCOUNTER
Spoke with jad she states  paper work will be faxed over for pt most recent test results. Also pt has a copy

## 2024-02-12 NOTE — TELEPHONE ENCOUNTER
----- Message from Arielle Horowitz sent at 2/12/2024  3:04 PM CST -----  Contact: [facility]  Oksana is calling in regards to test results.please call back at [phone]          Thanks  ARLEEN

## 2024-02-28 ENCOUNTER — OFFICE VISIT (OUTPATIENT)
Dept: CARDIOLOGY | Facility: CLINIC | Age: 74
End: 2024-02-28
Payer: MEDICARE

## 2024-02-28 VITALS
SYSTOLIC BLOOD PRESSURE: 126 MMHG | OXYGEN SATURATION: 100 % | RESPIRATION RATE: 16 BRPM | DIASTOLIC BLOOD PRESSURE: 74 MMHG | WEIGHT: 140.88 LBS | BODY MASS INDEX: 25.92 KG/M2 | HEIGHT: 62 IN | HEART RATE: 67 BPM

## 2024-02-28 DIAGNOSIS — J43.8 OTHER EMPHYSEMA: ICD-10-CM

## 2024-02-28 DIAGNOSIS — F17.211 CIGARETTE NICOTINE DEPENDENCE IN REMISSION: ICD-10-CM

## 2024-02-28 DIAGNOSIS — I70.0 AORTIC ATHEROSCLEROSIS: Primary | ICD-10-CM

## 2024-02-28 DIAGNOSIS — I25.2 HISTORY OF HEART ATTACK: ICD-10-CM

## 2024-02-28 DIAGNOSIS — Z12.31 OTHER SCREENING MAMMOGRAM: ICD-10-CM

## 2024-02-28 DIAGNOSIS — Z98.61 HISTORY OF CORONARY ANGIOPLASTY: ICD-10-CM

## 2024-02-28 DIAGNOSIS — I25.10 CORONARY ARTERY DISEASE, OCCLUSIVE: ICD-10-CM

## 2024-02-28 DIAGNOSIS — I10 BENIGN ESSENTIAL HTN: ICD-10-CM

## 2024-02-28 DIAGNOSIS — R93.1 ABNORMAL ECHOCARDIOGRAM: ICD-10-CM

## 2024-02-28 DIAGNOSIS — E78.5 DYSLIPIDEMIA: ICD-10-CM

## 2024-02-28 PROCEDURE — 3078F DIAST BP <80 MM HG: CPT | Mod: CPTII,S$GLB,, | Performed by: INTERNAL MEDICINE

## 2024-02-28 PROCEDURE — 3074F SYST BP LT 130 MM HG: CPT | Mod: CPTII,S$GLB,, | Performed by: INTERNAL MEDICINE

## 2024-02-28 PROCEDURE — 99999 PR PBB SHADOW E&M-EST. PATIENT-LVL III: CPT | Mod: PBBFAC,,, | Performed by: INTERNAL MEDICINE

## 2024-02-28 PROCEDURE — 1159F MED LIST DOCD IN RCRD: CPT | Mod: CPTII,S$GLB,, | Performed by: INTERNAL MEDICINE

## 2024-02-28 PROCEDURE — 1101F PT FALLS ASSESS-DOCD LE1/YR: CPT | Mod: CPTII,S$GLB,, | Performed by: INTERNAL MEDICINE

## 2024-02-28 PROCEDURE — 99214 OFFICE O/P EST MOD 30 MIN: CPT | Mod: S$GLB,,, | Performed by: INTERNAL MEDICINE

## 2024-02-28 PROCEDURE — 3008F BODY MASS INDEX DOCD: CPT | Mod: CPTII,S$GLB,, | Performed by: INTERNAL MEDICINE

## 2024-02-28 PROCEDURE — 3288F FALL RISK ASSESSMENT DOCD: CPT | Mod: CPTII,S$GLB,, | Performed by: INTERNAL MEDICINE

## 2024-02-28 NOTE — PROGRESS NOTES
Subjective:   Patient ID:  Shanda Marrero is a 73 y.o. female who presents for evaluation of No chief complaint on file.        HPI  February 28, 2024.    Comes in for a six-month follow-up.    Babysits her grand kids , active and denies any chest pain, no dyspnea, no lower extremity swelling   No palpitations, no orthopnea , no syncope , no falls   She has problems with insomnia.  She takes trazodone.        July 12, 2023.    Comes in for an overdue follow-up.    She is doing well.  She swims and exercises.  Brought out he limitations.    Denies any chest pain, no dyspnea.  No swelling.  Palpitations.  Syncope or presyncope.    Compliant with her medications.    Normal nuclear stress test 2022.    EF borderline 45%.      10.10.2022  Comes in for six-month follow-up.    She is doing well.  Denies any complaints.  No chest pain, no dyspnea.  No lower extremity swelling.    She was recently treated for pneumonia in September.  On a prior CT scan she had some nodules in the lower lobes and calcified hilar lymph nodes.  No syncope or presyncope.    No palpitations.    Compliant with her medications.    Doing well overall.  Her nuclear stress test was normal.      3.2022  72 yo female, ex smoker, had inferior stemi in 2009 s/p rca stenting, followed by elective stenting of her circ, cath films under imaging   She used to see Dr León  Comes in for a routine 6 months follow up   Had her lap cholecystectomy last year , no issues   States only SIMPSON NYHA 1 , with walking up the stairs after 1 flight   But denies any chest pressure, chest pain or discomfort   Compliant with meds   No recent hx of ed visits or hospitalizations for acs or adhf  No orthopnea or pnd  No hx of palpitations. No exertional dizziness or syncope  No hx of dvt or pe. No hx of edema or intermittent claudication  No focal cns symptoms or signs to suggest tia or stroke  No hx of abnormal bleeding  Compliant with meds   Mention of aflutter in chart ,  but patient denies, not on any available ekg       Past Medical History:   Diagnosis Date    *Atrial flutter     PAROXYSMAL    Acute coronary syndrome     OLD INF MI    Coronary artery disease     Hyperlipidemia     Hypertension        Past Surgical History:   Procedure Laterality Date    CATARACT EXTRACTION Left 2022    TORIC    CATARACT EXTRACTION Right 2022    Toric    CATARACT EXTRACTION W/ INTRAOCULAR LENS IMPLANT Left 2022    Ora/ Toric    COLONOSCOPY N/A 2023    Procedure: COLONOSCOPY;  Surgeon: Jef Fajardo MD;  Location: [facility];  Service: Endoscopy;  Laterality: N/A;    CORONARY ANGIOPLASTY  2009    RCA AND LCX- LISA    ERCP N/A 2022    Procedure: ERCP (ENDOSCOPIC RETROGRADE CHOLANGIOPANCREATOGRAPHY);  Surgeon: Nohelia Altamirano MD;  Location: [facility];  Service: Endoscopy;  Laterality: N/A;    ERCP N/A 2022    Procedure: ERCP (ENDOSCOPIC RETROGRADE CHOLANGIOPANCREATOGRAPHY);  Surgeon: Nohelia Altamirano MD;  Location: [facility];  Service: Endoscopy;  Laterality: N/A;    GALLBLADDER SURGERY  10/2021    LAPAROSCOPIC CHOLECYSTECTOMY N/A 2021    Procedure: CHOLECYSTECTOMY, LAPAROSCOPIC;  Surgeon: Balwinder Castrejon MD;  Location: [facility];  Service: General;  Laterality: N/A;       Social History     Tobacco Use    Smoking status: Former     Current packs/day: 0.00     Types: Cigarettes     Quit date: 2009     Years since quittin.4    Smokeless tobacco: Never   Substance Use Topics    Alcohol use: Yes     Comment: occassionally    Drug use: No       Family History   Problem Relation Age of Onset    Heart attack Father     Heart disease Father     Breast cancer Paternal Aunt        Review of Systems   Cardiovascular:  Negative for chest pain, dyspnea on exertion, palpitations and syncope.   Genitourinary: Negative.    Neurological: Negative.        Current Outpatient Medications on File Prior to Visit   Medication Sig    clopidogreL  (PLAVIX) 75 mg tablet TAKE 1 TABLET EVERY DAY    levoFLOXacin (LEVAQUIN) 500 MG tablet Take 1 tablet (500 mg total) by mouth once daily.    metoprolol succinate (TOPROL-XL) 25 MG 24 hr tablet TAKE 1 TABLET EVERY DAY    simvastatin (ZOCOR) 40 MG tablet TAKE 1 TABLET EVERY EVENING    traZODone (DESYREL) 100 MG tablet Take 100 mg by mouth every evening.     No current facility-administered medications on file prior to visit.       Objective:   Objective:  Wt Readings from Last 3 Encounters:   07/12/23 62.5 kg (137 lb 12.6 oz)   04/06/23 59.9 kg (132 lb)   12/30/22 62.6 kg (138 lb 1.9 oz)     Temp Readings from Last 3 Encounters:   04/06/23 97.5 °F (36.4 °C) (Temporal)   10/03/22 97.7 °F (36.5 °C) (Temporal)   09/25/22 98.7 °F (37.1 °C) (Oral)     BP Readings from Last 3 Encounters:   07/12/23 124/68   04/06/23 128/61   12/30/22 120/80     Pulse Readings from Last 3 Encounters:   07/12/23 (!) 58   04/06/23 68   12/30/22 75       Physical Exam  Vitals reviewed.   Constitutional:       Appearance: She is well-developed.   Neck:      Vascular: No carotid bruit.   Cardiovascular:      Rate and Rhythm: Normal rate and regular rhythm.      Pulses: Intact distal pulses.      Heart sounds: Normal heart sounds. No murmur heard.  Pulmonary:      Breath sounds: Normal breath sounds.   Neurological:      Mental Status: She is oriented to person, place, and time.         Lab Results   Component Value Date    CHOL 178 03/31/2022    CHOL 194 12/11/2020    CHOL 181 10/19/2017     Lab Results   Component Value Date    HDL 66 03/31/2022    HDL 53 12/11/2020    HDL 60 10/19/2017     Lab Results   Component Value Date    LDLCALC 98.0 03/31/2022    LDLCALC 118.4 12/11/2020    LDLCALC 94.6 10/19/2017     Lab Results   Component Value Date    TRIG 70 03/31/2022    TRIG 113 12/11/2020    TRIG 132 10/19/2017     Lab Results   Component Value Date    CHOLHDL 37.1 03/31/2022    CHOLHDL 27.3 12/11/2020    CHOLHDL 33.1 10/19/2017       Chemistry         Component Value Date/Time     09/25/2022 1347    K 3.9 09/25/2022 1347     09/25/2022 1347    CO2 25 09/25/2022 1347    BUN 9 09/25/2022 1347    CREATININE 0.7 09/25/2022 1347     09/25/2022 1347        Component Value Date/Time    CALCIUM 8.7 09/25/2022 1347    ALKPHOS 77 09/25/2022 1347    AST 16 09/25/2022 1347    ALT 9 (L) 09/25/2022 1347    BILITOT 1.0 09/25/2022 1347    ESTGFRAFRICA >60.0 07/22/2022 1206    EGFRNONAA >60.0 07/22/2022 1206          Lab Results   Component Value Date    TSH 1.612 12/11/2020     Lab Results   Component Value Date    INR 1.0 10/16/2009    INR 1.0 09/21/2009     Lab Results   Component Value Date    WBC 10.72 09/25/2022    HGB 12.7 09/25/2022    HCT 38.5 09/25/2022    MCV 85 09/25/2022     09/25/2022     BNP  @LABRCNTIP(BNP,BNPTRIAGEBLO)@  CrCl cannot be calculated (Patient's most recent lab result is older than the maximum 7 days allowed.).     Imaging:  ======  No results found for this or any previous visit.    No results found for this or any previous visit.    No results found for this or any previous visit.    No results found for this or any previous visit.    No results found for this or any previous visit.    No valid procedures specified.    Diagnostic Results:  ECG: Reviewed  Normal sinus rhythm  Low voltage QRS  Nonspecific ST and T wave abnormality  Abnormal ECG  When compared with ECG of 03-SEP-2020 14:57,  No significant change was found  Confirmed by MD ABILIO, LEE ANN ([id number]) on 9/3/2021 9:45:47     Results for orders placed during the hospital encounter of 04/21/22    Echo    Interpretation Summary  · The left ventricle is normal in size with mildly decreased systolic function.  · Grade I left ventricular diastolic dysfunction.  · The estimated PA systolic pressure is 26 mmHg.  · Normal central venous pressure (3 mmHg).  · Mild tricuspid regurgitation.  · The estimated ejection fraction is 45-50%.  · There are segmental left ventricular  wall motion abnormalities.  · There is mild aortic valve stenosis.  · Aortic valve area is 1.29 cm2; peak velocity is 1.6 m/s; mean gradient is 6 mmHg.  · Normal right ventricular size with normal right ventricular systolic function.    No results found for this or any previous visit.  Results for orders placed during the hospital encounter of 06/13/22    Nuclear Stress - Cardiology Interpreted    Interpretation Summary    Normal myocardial perfusion scan. There is no evidence of myocardial ischemia or infarction.    The gated perfusion images showed an ejection fraction of 65% at rest. The gated perfusion images showed an ejection fraction of 73% post stress. Normal ejection fraction is greater than 59%.    The EKG portion of this study is negative for ischemia.    During stress, rare PACs are noted. , During stress, rare PVCs are noted.      The ASCVD Risk score (Carolyn STAFFORD, et al., 2019) failed to calculate for the following reasons:    The patient has a prior MI or stroke diagnosis    Assessment and Plan:   Aortic atherosclerosis    Other emphysema    Coronary artery disease, occlusive    History of coronary angioplasty    History of heart attack    Dyslipidemia    Cigarette nicotine dependence in remission    Benign essential HTN    Abnormal echocardiogram      Angina free, euvolemic, blood pressure controlled.    Continue to exercise.  Continue with beta-blocker, statin and Plavix.  Reviewed all tests and above medical conditions with patient in detail and formulated treatment plan.  Risk factor modification discussed.   Cardiac low salt diet discussed.  Maintaining healthy weight and weight loss goals were discussed in clinic.  Reviewed angiogram films from 2009   Reviewed echocardiogram 2022.   normal nuclear stress test. 2022  Discussed sleep hygiene changes.  Follow up in 6 Months

## 2024-03-06 DIAGNOSIS — Z78.0 MENOPAUSE: ICD-10-CM

## 2024-03-25 ENCOUNTER — TELEPHONE (OUTPATIENT)
Dept: PULMONOLOGY | Facility: CLINIC | Age: 74
End: 2024-03-25
Payer: MEDICARE

## 2024-03-25 NOTE — TELEPHONE ENCOUNTER
left vm for pt to call back regarding billing issue ----- Message from Gemini Colbert sent at 3/25/2024  8:58 AM CDT -----  Regarding: [facility]  States he is calling regarding a high co-payment. Her copay for a CT Scan should be $30.00. Please call pt [phone]. Thank you

## 2024-04-03 DIAGNOSIS — I10 BENIGN ESSENTIAL HTN: ICD-10-CM

## 2024-04-08 ENCOUNTER — HOSPITAL ENCOUNTER (OUTPATIENT)
Dept: RADIOLOGY | Facility: HOSPITAL | Age: 74
Discharge: HOME | End: 2024-04-08
Attending: FAMILY MEDICINE
Payer: MEDICARE

## 2024-04-08 VITALS — WEIGHT: 140.88 LBS | BODY MASS INDEX: 25.92 KG/M2 | HEIGHT: 62 IN

## 2024-04-08 DIAGNOSIS — Z12.31 OTHER SCREENING MAMMOGRAM: ICD-10-CM

## 2024-04-08 PROCEDURE — 77067 SCR MAMMO BI INCL CAD: CPT | Mod: 26,,, | Performed by: RADIOLOGY

## 2024-04-08 PROCEDURE — 77067 SCR MAMMO BI INCL CAD: CPT | Mod: TC

## 2024-04-08 PROCEDURE — 77063 BREAST TOMOSYNTHESIS BI: CPT | Mod: 26,,, | Performed by: RADIOLOGY

## 2024-04-15 ENCOUNTER — TELEPHONE (OUTPATIENT)
Dept: CARDIOLOGY | Facility: CLINIC | Age: 74
End: 2024-04-15
Payer: MEDICARE

## 2024-04-15 NOTE — TELEPHONE ENCOUNTER
Information sent to the dental office to reschedule dental procedure until the patient has stopped the blood thinners.    Karissa Wells Staff  Caller: [facility]/ [facility] (Today,  3:06 PM)  [facility] have patient in chair waiting for clearance. Please call  [phone]. We contacted the clinic to find out what the patient was having done today.   Patient is having extractions, crowns, root canal with I V sedation today. [facility] would like to know if she is clear for today? Her last office visit was 02/28/2024. Information was sent to the wrong fax number we confirmed there correct number being [phone]     A staff message was sent to the provider for response.     Provider requested the patient reschedule.    cleared, ok to stop plavix for 5 days prior

## 2024-05-30 DIAGNOSIS — I25.10 CORONARY ATHEROSCLEROSIS: ICD-10-CM

## 2024-05-30 RX ORDER — CLOPIDOGREL BISULFATE 75 MG/1
75 TABLET ORAL DAILY
Qty: 90 TABLET | Refills: 3 | Status: SHIPPED | OUTPATIENT
Start: 2024-05-30

## 2024-05-30 RX ORDER — METOPROLOL SUCCINATE 25 MG/1
25 TABLET, EXTENDED RELEASE ORAL DAILY
Qty: 90 TABLET | Refills: 3 | Status: SHIPPED | OUTPATIENT
Start: 2024-05-30

## 2024-05-30 RX ORDER — SIMVASTATIN 40 MG/1
40 TABLET, FILM COATED ORAL NIGHTLY
Qty: 90 TABLET | Refills: 2 | Status: SHIPPED | OUTPATIENT
Start: 2024-05-30

## 2024-06-05 NOTE — TELEPHONE ENCOUNTER
Scheduled patient as requested----- Message from Susy Barajas PA-C sent at 12/29/2023  3:37 PM CST -----  Please schedule for chest ct and follow up in 3 months for pneumonia     Urine Culture positive for Klebsiella pneumoniae.    Urine culture predicts susceptibility to the oral agents cefaclor, cefdinir, cefpodoxime, cefprozil, cefuroxime, cephalexin.    Patient currently on cefdinir and reports symptom improvement.  Patient has follow-up with primary on Friday, 6/7/24.    Advised to follow with PCP as scheduled.    If symptoms fail to improve, for any change in condition, further concerns, or new concerning symptoms please return to urgent care or the ER to be re-evaluated.      Patient understands and agrees with plan of care discussed over the phone.

## 2024-08-28 ENCOUNTER — PATIENT MESSAGE (OUTPATIENT)
Dept: ADMINISTRATIVE | Facility: HOSPITAL | Age: 74
End: 2024-08-28
Payer: MEDICARE

## 2025-01-13 DIAGNOSIS — I25.10 CORONARY ATHEROSCLEROSIS: ICD-10-CM

## 2025-01-15 RX ORDER — SIMVASTATIN 40 MG/1
40 TABLET, FILM COATED ORAL NIGHTLY
Qty: 90 TABLET | Refills: 3 | Status: SHIPPED | OUTPATIENT
Start: 2025-01-15

## 2025-02-20 ENCOUNTER — HOSPITAL ENCOUNTER (OUTPATIENT)
Dept: CARDIOLOGY | Facility: HOSPITAL | Age: 75
Discharge: HOME | End: 2025-02-20
Attending: INTERNAL MEDICINE
Payer: MEDICARE

## 2025-02-20 ENCOUNTER — OFFICE VISIT (OUTPATIENT)
Dept: CARDIOLOGY | Facility: CLINIC | Age: 75
End: 2025-02-20
Payer: MEDICARE

## 2025-02-20 VITALS
WEIGHT: 137.81 LBS | DIASTOLIC BLOOD PRESSURE: 74 MMHG | HEIGHT: 62 IN | RESPIRATION RATE: 16 BRPM | HEART RATE: 65 BPM | OXYGEN SATURATION: 99 % | BODY MASS INDEX: 25.36 KG/M2 | SYSTOLIC BLOOD PRESSURE: 125 MMHG

## 2025-02-20 DIAGNOSIS — I25.10 ATHEROSCLEROSIS OF CORONARY ARTERY, UNSPECIFIED VESSEL OR LESION TYPE, UNSPECIFIED WHETHER ANGINA PRESENT, UNSPECIFIED WHETHER NATIVE OR TRANSPLANTED HEART: ICD-10-CM

## 2025-02-20 DIAGNOSIS — Z98.61 HISTORY OF CORONARY ANGIOPLASTY: ICD-10-CM

## 2025-02-20 DIAGNOSIS — I70.0 AORTIC ATHEROSCLEROSIS: ICD-10-CM

## 2025-02-20 DIAGNOSIS — I25.10 CORONARY ATHEROSCLEROSIS: ICD-10-CM

## 2025-02-20 DIAGNOSIS — I25.2 HISTORY OF HEART ATTACK: ICD-10-CM

## 2025-02-20 DIAGNOSIS — I25.10 CORONARY ARTERY DISEASE, OCCLUSIVE: ICD-10-CM

## 2025-02-20 DIAGNOSIS — E78.5 DYSLIPIDEMIA: ICD-10-CM

## 2025-02-20 DIAGNOSIS — I25.10 ATHEROSCLEROSIS OF CORONARY ARTERY, UNSPECIFIED VESSEL OR LESION TYPE, UNSPECIFIED WHETHER ANGINA PRESENT, UNSPECIFIED WHETHER NATIVE OR TRANSPLANTED HEART: Primary | ICD-10-CM

## 2025-02-20 DIAGNOSIS — I10 BENIGN ESSENTIAL HTN: ICD-10-CM

## 2025-02-20 DIAGNOSIS — Z87.891 EX-SMOKER: ICD-10-CM

## 2025-02-20 DIAGNOSIS — I25.10 ATHEROSCLEROSIS OF NATIVE CORONARY ARTERY OF NATIVE HEART WITHOUT ANGINA PECTORIS: Primary | ICD-10-CM

## 2025-02-20 LAB
OHS QRS DURATION: 72 MS
OHS QTC CALCULATION: 406 MS

## 2025-02-20 PROCEDURE — 93005 ELECTROCARDIOGRAM TRACING: CPT | Mod: HCNC

## 2025-02-20 RX ORDER — SIMVASTATIN 40 MG/1
40 TABLET, FILM COATED ORAL NIGHTLY
Qty: 90 TABLET | Refills: 3 | Status: SHIPPED | OUTPATIENT
Start: 2025-02-20

## 2025-02-20 RX ORDER — METOPROLOL SUCCINATE 25 MG/1
25 TABLET, EXTENDED RELEASE ORAL DAILY
Qty: 90 TABLET | Refills: 3 | Status: SHIPPED | OUTPATIENT
Start: 2025-02-20

## 2025-02-20 RX ORDER — GABAPENTIN 100 MG/1
100 CAPSULE ORAL NIGHTLY
COMMUNITY

## 2025-02-20 RX ORDER — CLOPIDOGREL BISULFATE 75 MG/1
75 TABLET ORAL DAILY
Qty: 90 TABLET | Refills: 3 | Status: SHIPPED | OUTPATIENT
Start: 2025-02-20

## 2025-02-20 NOTE — PROGRESS NOTES
Subjective:   Patient ID:  Shanda Marrero is a 74 y.o. female who presents for evaluation of No chief complaint on file.        HPI  2.20.2025  Here for six-month follow-up.    Denies angina.    No significant dyspnea on exertion lower extremity swelling palpitations syncope or presyncope   Need to refills    February 28, 2024.    Comes in for a six-month follow-up.    Babysits her grand kids , active and denies any chest pain, no dyspnea, no lower extremity swelling   No palpitations, no orthopnea , no syncope , no falls   She has problems with insomnia.  She takes trazodone.        July 12, 2023.    Comes in for an overdue follow-up.    She is doing well.  She swims and exercises.  Brought out he limitations.    Denies any chest pain, no dyspnea.  No swelling.  Palpitations.  Syncope or presyncope.    Compliant with her medications.    Normal nuclear stress test 2022.    EF borderline 45%.      10.10.2022  Comes in for six-month follow-up.    She is doing well.  Denies any complaints.  No chest pain, no dyspnea.  No lower extremity swelling.    She was recently treated for pneumonia in September.  On a prior CT scan she had some nodules in the lower lobes and calcified hilar lymph nodes.  No syncope or presyncope.    No palpitations.    Compliant with her medications.    Doing well overall.  Her nuclear stress test was normal.      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 No exertional dizziness or syncope  No hx of dvt or pe. No hx  of edema or intermittent claudication  No focal cns symptoms or signs to suggest tia or stroke  No hx of abnormal bleeding  Compliant with meds   Mention of aflutter in chart , but patient denies, not on any available ekg       Past Medical History:   Diagnosis Date    *Atrial flutter     PAROXYSMAL    Acute coronary syndrome     OLD INF MI    Coronary artery disease     Hyperlipidemia     Hypertension        Past Surgical History:   Procedure Laterality Date    CATARACT EXTRACTION Left 03/03/2022    TORIC    CATARACT EXTRACTION Right 03/24/2022    Toric    CATARACT EXTRACTION W/ INTRAOCULAR LENS IMPLANT Left 03/03/2022    Ora/ Toric    COLONOSCOPY N/A 4/6/2023    Procedure: COLONOSCOPY;  Surgeon: Jef Fajardo MD;  Location: [facility];  Service: Endoscopy;  Laterality: N/A;    CORONARY ANGIOPLASTY  09/2009    RCA AND LCX- LISA    ERCP N/A 07/18/2022    Procedure: ERCP (ENDOSCOPIC RETROGRADE CHOLANGIOPANCREATOGRAPHY);  Surgeon: Nohelia Altamirano MD;  Location: [facility];  Service: Endoscopy;  Laterality: N/A;    ERCP N/A 09/20/2022    Procedure: ERCP (ENDOSCOPIC RETROGRADE CHOLANGIOPANCREATOGRAPHY);  Surgeon: Nohelia Altamirano MD;  Location: [facility];  Service: Endoscopy;  Laterality: N/A;    GALLBLADDER SURGERY  10/2021    LAPAROSCOPIC CHOLECYSTECTOMY N/A 09/24/2021    Procedure: CHOLECYSTECTOMY, LAPAROSCOPIC;  Surgeon: Balwinder Castrejon MD;  Location: [facility];  Service: General;  Laterality: N/A;       Social History     Tobacco Use    Smoking status: Former     Current packs/day: 0.00     Types: Cigarettes     Quit date: 9/21/2009     Years since quitting: 15.4    Smokeless tobacco: Never   Substance Use Topics    Alcohol use: Yes     Comment: occassionally    Drug use: No       Family History   Problem Relation Name Age of Onset    Heart attack Father      Heart disease Father      Breast cancer Paternal Aunt         Review of Systems   Cardiovascular:  Negative for chest pain, dyspnea on  exertion, palpitations and syncope.   Genitourinary: Negative.    Neurological: Negative.        Current Outpatient Medications on File Prior to Visit   Medication Sig    gabapentin (NEURONTIN) 100 MG capsule Take 100 mg by mouth every evening. Takes 2 pills nightly    levoFLOXacin (LEVAQUIN) 500 MG tablet Take 1 tablet (500 mg total) by mouth once daily.    traZODone (DESYREL) 100 MG tablet Take 100 mg by mouth every evening.    [DISCONTINUED] clopidogreL (PLAVIX) 75 mg tablet Take 1 tablet (75 mg total) by mouth once daily.    [DISCONTINUED] metoprolol succinate (TOPROL-XL) 25 MG 24 hr tablet Take 1 tablet (25 mg total) by mouth once daily.    [DISCONTINUED] simvastatin (ZOCOR) 40 MG tablet TAKE 1 TABLET BY MOUTH IN THE  EVENING     No current facility-administered medications on file prior to visit.       Objective:   Objective:  Wt Readings from Last 3 Encounters:   02/20/25 62.5 kg (137 lb 12.6 oz)   04/08/24 63.9 kg (140 lb 14 oz)   02/28/24 63.9 kg (140 lb 14 oz)     Temp Readings from Last 3 Encounters:   04/06/23 97.5 °F (36.4 °C) (Temporal)   10/03/22 97.7 °F (36.5 °C) (Temporal)   09/25/22 98.7 °F (37.1 °C) (Oral)     BP Readings from Last 3 Encounters:   02/20/25 125/74   02/28/24 126/74   07/12/23 124/68     Pulse Readings from Last 3 Encounters:   02/20/25 65   02/28/24 67   07/12/23 (!) 58       Physical Exam  Vitals reviewed.   Constitutional:       Appearance: She is well-developed.   Neck:      Vascular: No carotid bruit.   Cardiovascular:      Rate and Rhythm: Normal rate and regular rhythm.      Pulses: Intact distal pulses.      Heart sounds: Normal heart sounds. No murmur heard.  Pulmonary:      Breath sounds: Normal breath sounds.   Neurological:      Mental Status: She is oriented to person, place, and time.         Lab Results   Component Value Date    CHOL 178 03/31/2022    CHOL 194 12/11/2020    CHOL 181 10/19/2017     Lab Results   Component Value Date    HDL 66 03/31/2022    HDL 53  12/11/2020    HDL 60 10/19/2017     Lab Results   Component Value Date    LDLCALC 98.0 03/31/2022    LDLCALC 118.4 12/11/2020    LDLCALC 94.6 10/19/2017     Lab Results   Component Value Date    TRIG 70 03/31/2022    TRIG 113 12/11/2020    TRIG 132 10/19/2017     Lab Results   Component Value Date    CHOLHDL 37.1 03/31/2022    CHOLHDL 27.3 12/11/2020    CHOLHDL 33.1 10/19/2017       Chemistry        Component Value Date/Time     09/25/2022 1347    K 3.9 09/25/2022 1347     09/25/2022 1347    CO2 25 09/25/2022 1347    BUN 9 09/25/2022 1347    CREATININE 0.7 09/25/2022 1347     09/25/2022 1347        Component Value Date/Time    CALCIUM 8.7 09/25/2022 1347    ALKPHOS 77 09/25/2022 1347    AST 16 09/25/2022 1347    ALT 9 (L) 09/25/2022 1347    BILITOT 1.0 09/25/2022 1347    ESTGFRAFRICA >60.0 07/22/2022 1206    EGFRNONAA >60.0 07/22/2022 1206          Lab Results   Component Value Date    TSH 1.612 12/11/2020     Lab Results   Component Value Date    INR 1.0 10/16/2009    INR 1.0 09/21/2009     Lab Results   Component Value Date    WBC 10.72 09/25/2022    HGB 12.7 09/25/2022    HCT 38.5 09/25/2022    MCV 85 09/25/2022     09/25/2022     BNP  @LABRCNTIP(BNP,BNPTRIAGEBLO)@  CrCl cannot be calculated (Patient's most recent lab result is older than the maximum 7 days allowed.).     Imaging:  ======  No results found for this or any previous visit.    No results found for this or any previous visit.    No results found for this or any previous visit.    No results found for this or any previous visit.    No results found for this or any previous visit.    No valid procedures specified.    Diagnostic Results:  ECG: Reviewed  Normal sinus rhythm  Low voltage QRS  Nonspecific ST and T wave abnormality  Abnormal ECG  When compared with ECG of 03-SEP-2020 14:57,  No significant change was found  Confirmed by MD ABILIO, LEE ANN ([id number]) on 9/3/2021 9:45:47     Results for orders placed during the hospital  encounter of 04/21/22    Echo    Interpretation Summary  · The left ventricle is normal in size with mildly decreased systolic function.  · Grade I left ventricular diastolic dysfunction.  · The estimated PA systolic pressure is 26 mmHg.  · Normal central venous pressure (3 mmHg).  · Mild tricuspid regurgitation.  · The estimated ejection fraction is 45-50%.  · There are segmental left ventricular wall motion abnormalities.  · There is mild aortic valve stenosis.  · Aortic valve area is 1.29 cm2; peak velocity is 1.6 m/s; mean gradient is 6 mmHg.  · Normal right ventricular size with normal right ventricular systolic function.    No results found for this or any previous visit.  Results for orders placed during the hospital encounter of 06/13/22    Nuclear Stress - Cardiology Interpreted    Interpretation Summary    Normal myocardial perfusion scan. There is no evidence of myocardial ischemia or infarction.    The gated perfusion images showed an ejection fraction of 65% at rest. The gated perfusion images showed an ejection fraction of 73% post stress. Normal ejection fraction is greater than 59%.    The EKG portion of this study is negative for ischemia.    During stress, rare PACs are noted. , During stress, rare PVCs are noted.      The ASCVD Risk score (Carolyn STAFFORD, et al., 2019) failed to calculate for the following reasons:    Risk score cannot be calculated because patient has a medical history suggesting prior/existing ASCVD    Assessment and Plan:   Atherosclerosis of native coronary artery of native heart without angina pectoris  -     CV Ultrasound Bilateral Doppler Carotid; Future  -     Lipid Panel; Future; Expected date: 02/20/2025    Coronary artery disease, occlusive  -     CV Ultrasound Bilateral Doppler Carotid; Future  -     clopidogreL (PLAVIX) 75 mg tablet; Take 1 tablet (75 mg total) by mouth once daily.  Dispense: 90 tablet; Refill: 3  -     metoprolol succinate (TOPROL-XL) 25 MG 24 hr tablet;  Take 1 tablet (25 mg total) by mouth once daily.  Dispense: 90 tablet; Refill: 3  -     simvastatin (ZOCOR) 40 MG tablet; Take 1 tablet (40 mg total) by mouth every evening.  Dispense: 90 tablet; Refill: 3    Coronary atherosclerosis  -     simvastatin (ZOCOR) 40 MG tablet; Take 1 tablet (40 mg total) by mouth every evening.  Dispense: 90 tablet; Refill: 3    Aortic atherosclerosis    History of coronary angioplasty    History of heart attack    Benign essential HTN    Ex-smoker    Dyslipidemia        Angina free, euvolemic, blood pressure controlled.    Continue to exercise.  Continue with beta-blocker, statin and Plavix.  Reviewed all tests and above medical conditions with patient in detail and formulated treatment plan.  Risk factor modification discussed.   Cardiac low salt diet discussed.  Maintaining healthy weight and weight loss goals were discussed in clinic.  Reviewed angiogram films from 2009 on prior visit  Reviewed echocardiogram 2022.   normal nuclear stress test. 2022  Encouraged to exercise more  Carotid artery screening given history of CAD  Follow up in 6 Months

## 2025-02-21 DIAGNOSIS — Z00.00 ENCOUNTER FOR MEDICARE ANNUAL WELLNESS EXAM: ICD-10-CM

## 2025-02-25 ENCOUNTER — HOSPITAL ENCOUNTER (OUTPATIENT)
Dept: CARDIOLOGY | Facility: HOSPITAL | Age: 75
Discharge: HOME | End: 2025-02-25
Attending: INTERNAL MEDICINE
Payer: MEDICARE

## 2025-02-25 VITALS
HEIGHT: 62 IN | BODY MASS INDEX: 25.21 KG/M2 | DIASTOLIC BLOOD PRESSURE: 74 MMHG | SYSTOLIC BLOOD PRESSURE: 125 MMHG | WEIGHT: 137 LBS

## 2025-02-25 DIAGNOSIS — I25.10 CORONARY ARTERY DISEASE, OCCLUSIVE: ICD-10-CM

## 2025-02-25 DIAGNOSIS — I25.10 ATHEROSCLEROSIS OF NATIVE CORONARY ARTERY OF NATIVE HEART WITHOUT ANGINA PECTORIS: ICD-10-CM

## 2025-02-25 LAB
LEFT ARM DIASTOLIC BLOOD PRESSURE: 75 MMHG
LEFT ARM SYSTOLIC BLOOD PRESSURE: 127 MMHG
LEFT CBA DIAS: 26 CM/S
LEFT CBA SYS: 91 CM/S
LEFT CCA DIST DIAS: 14 CM/S
LEFT CCA DIST SYS: 61 CM/S
LEFT CCA MID DIAS: 16 CM/S
LEFT CCA MID SYS: 68 CM/S
LEFT CCA PROX DIAS: 20 CM/S
LEFT CCA PROX SYS: 99 CM/S
LEFT ECA DIAS: 13 CM/S
LEFT ECA SYS: 89 CM/S
LEFT ICA DIST DIAS: 32 CM/S
LEFT ICA DIST SYS: 78 CM/S
LEFT ICA MID DIAS: 29 CM/S
LEFT ICA MID SYS: 82 CM/S
LEFT ICA PROX DIAS: 22 CM/S
LEFT ICA PROX SYS: 76 CM/S
LEFT VERTEBRAL DIAS: 11 CM/S
LEFT VERTEBRAL SYS: 54 CM/S
OHS CV CAROTID RIGHT ICA EDV HIGHEST: 26
OHS CV CAROTID ULTRASOUND LEFT ICA/CCA RATIO: 1.34
OHS CV CAROTID ULTRASOUND RIGHT ICA/CCA RATIO: 1.25
OHS CV PV CAROTID LEFT HIGHEST CCA: 99
OHS CV PV CAROTID LEFT HIGHEST ICA: 82
OHS CV PV CAROTID RIGHT HIGHEST CCA: 100
OHS CV PV CAROTID RIGHT HIGHEST ICA: 80
OHS CV US CAROTID LEFT HIGHEST EDV: 32
RIGHT ARM DIASTOLIC BLOOD PRESSURE: 74 MMHG
RIGHT ARM SYSTOLIC BLOOD PRESSURE: 125 MMHG
RIGHT CBA DIAS: 16 CM/S
RIGHT CBA SYS: 53 CM/S
RIGHT CCA DIST DIAS: 16 CM/S
RIGHT CCA DIST SYS: 64 CM/S
RIGHT CCA MID DIAS: 14 CM/S
RIGHT CCA MID SYS: 63 CM/S
RIGHT CCA PROX DIAS: 23 CM/S
RIGHT CCA PROX SYS: 100 CM/S
RIGHT ECA DIAS: 16 CM/S
RIGHT ECA SYS: 99 CM/S
RIGHT ICA DIST DIAS: 25 CM/S
RIGHT ICA DIST SYS: 80 CM/S
RIGHT ICA MID DIAS: 26 CM/S
RIGHT ICA MID SYS: 63 CM/S
RIGHT ICA PROX DIAS: 16 CM/S
RIGHT ICA PROX SYS: 53 CM/S
RIGHT VERTEBRAL DIAS: 16 CM/S
RIGHT VERTEBRAL SYS: 61 CM/S

## 2025-02-25 PROCEDURE — 93880 EXTRACRANIAL BILAT STUDY: CPT | Mod: 26,,, | Performed by: INTERNAL MEDICINE

## 2025-02-25 PROCEDURE — 93880 EXTRACRANIAL BILAT STUDY: CPT

## 2025-02-27 ENCOUNTER — RESULTS FOLLOW-UP (OUTPATIENT)
Dept: CARDIOLOGY | Facility: CLINIC | Age: 75
End: 2025-02-27

## 2025-07-01 ENCOUNTER — PATIENT OUTREACH (OUTPATIENT)
Dept: ADMINISTRATIVE | Facility: HOSPITAL | Age: 75
End: 2025-07-01
Payer: MEDICARE

## 2025-07-01 NOTE — PROGRESS NOTES
Carole REZA and JANICE report - chart searched, called pt and she stated that she does not feel that it is necessary to schedule an appt and that she is healthy.

## 2025-08-26 ENCOUNTER — OFFICE VISIT (OUTPATIENT)
Dept: CARDIOLOGY | Facility: CLINIC | Age: 75
End: 2025-08-26
Payer: MEDICARE

## 2025-08-26 VITALS
SYSTOLIC BLOOD PRESSURE: 108 MMHG | OXYGEN SATURATION: 95 % | BODY MASS INDEX: 24.66 KG/M2 | HEART RATE: 60 BPM | WEIGHT: 134.81 LBS | DIASTOLIC BLOOD PRESSURE: 67 MMHG

## 2025-08-26 DIAGNOSIS — I25.2 HISTORY OF HEART ATTACK: ICD-10-CM

## 2025-08-26 DIAGNOSIS — I70.0 AORTIC ATHEROSCLEROSIS: ICD-10-CM

## 2025-08-26 DIAGNOSIS — I25.10 ATHEROSCLEROSIS OF NATIVE CORONARY ARTERY OF NATIVE HEART WITHOUT ANGINA PECTORIS: Primary | ICD-10-CM

## 2025-08-26 DIAGNOSIS — Z98.61 HISTORY OF CORONARY ANGIOPLASTY: ICD-10-CM

## 2025-08-26 DIAGNOSIS — E78.5 DYSLIPIDEMIA: ICD-10-CM

## 2025-08-26 DIAGNOSIS — Z87.891 EX-SMOKER: ICD-10-CM

## 2025-08-26 DIAGNOSIS — J43.8 OTHER EMPHYSEMA: ICD-10-CM

## 2025-08-26 PROCEDURE — 1126F AMNT PAIN NOTED NONE PRSNT: CPT | Mod: CPTII,HCNC,S$GLB, | Performed by: INTERNAL MEDICINE

## 2025-08-26 PROCEDURE — 3288F FALL RISK ASSESSMENT DOCD: CPT | Mod: CPTII,HCNC,S$GLB, | Performed by: INTERNAL MEDICINE

## 2025-08-26 PROCEDURE — 3078F DIAST BP <80 MM HG: CPT | Mod: CPTII,HCNC,S$GLB, | Performed by: INTERNAL MEDICINE

## 2025-08-26 PROCEDURE — 99999 PR PBB SHADOW E&M-EST. PATIENT-LVL III: CPT | Mod: PBBFAC,HCNC,, | Performed by: INTERNAL MEDICINE

## 2025-08-26 PROCEDURE — 99214 OFFICE O/P EST MOD 30 MIN: CPT | Mod: HCNC,S$GLB,, | Performed by: INTERNAL MEDICINE

## 2025-08-26 PROCEDURE — 1159F MED LIST DOCD IN RCRD: CPT | Mod: CPTII,HCNC,S$GLB, | Performed by: INTERNAL MEDICINE

## 2025-08-26 PROCEDURE — 3074F SYST BP LT 130 MM HG: CPT | Mod: CPTII,HCNC,S$GLB, | Performed by: INTERNAL MEDICINE

## 2025-08-26 PROCEDURE — 1101F PT FALLS ASSESS-DOCD LE1/YR: CPT | Mod: CPTII,HCNC,S$GLB, | Performed by: INTERNAL MEDICINE

## 2025-08-26 PROCEDURE — 3008F BODY MASS INDEX DOCD: CPT | Mod: CPTII,HCNC,S$GLB, | Performed by: INTERNAL MEDICINE

## 2025-08-26 RX ORDER — OMEPRAZOLE 40 MG/1
40 CAPSULE, DELAYED RELEASE ORAL DAILY
COMMUNITY
Start: 2025-05-25

## 2025-08-26 RX ORDER — EZETIMIBE 10 MG/1
10 TABLET ORAL DAILY
Qty: 90 TABLET | Refills: 3 | Status: SHIPPED | OUTPATIENT
Start: 2025-08-26 | End: 2025-08-28 | Stop reason: SDUPTHER

## 2025-08-28 DIAGNOSIS — I25.10 ATHEROSCLEROSIS OF NATIVE CORONARY ARTERY OF NATIVE HEART WITHOUT ANGINA PECTORIS: ICD-10-CM

## 2025-08-28 RX ORDER — EZETIMIBE 10 MG/1
10 TABLET ORAL DAILY
Qty: 90 TABLET | Refills: 3 | Status: SHIPPED | OUTPATIENT
Start: 2025-08-28 | End: 2026-08-28

## (undated) DEVICE — MANIFOLD 4 PORT

## (undated) DEVICE — NDL SAFETY 25G X 1.5 ECLIPSE

## (undated) DEVICE — SEE MEDLINE ITEM 152622

## (undated) DEVICE — COVER CAMERA OPERATING ROOM

## (undated) DEVICE — SUT MONOCRYL 4.0 PS2 CP496G

## (undated) DEVICE — ELECTRODE ENDOPATH + II 5X34

## (undated) DEVICE — DRAPE ABDOMINAL TIBURON 14X11

## (undated) DEVICE — SEE MEDLINE ITEM 157117

## (undated) DEVICE — KIT ANTIFOG

## (undated) DEVICE — SET CYSTO IRRIGATION UNIV SPIK

## (undated) DEVICE — NDL PNEUMO INSUFFLATI 120MM

## (undated) DEVICE — SUT VICRYL+ 27 UR-6 VIOL

## (undated) DEVICE — GLOVE SURGICAL LATEX SZ 7

## (undated) DEVICE — COVER LIGHT HANDLE 80/CA

## (undated) DEVICE — ELECTRODE REM PLYHSV RETURN 9

## (undated) DEVICE — CANNULA ENDOPATH XCEL 5X100MM

## (undated) DEVICE — SET PNEUMOCLEAR HEAT HUM SE HF

## (undated) DEVICE — SEE MEDLINE ITEM 157027

## (undated) DEVICE — SYR 10CC LUER LOCK

## (undated) DEVICE — TROCAR ENDOPATH XCEL 12X100MM

## (undated) DEVICE — SCISSOR 5MMX35CM DIRECT DRIVE

## (undated) DEVICE — TROCAR ENDOPATH XCEL 5X100MM

## (undated) DEVICE — SYS IRRIG PRESSURIZED SPIKE

## (undated) DEVICE — APPLIER CLIP ENDO LIGAMAX 5MM

## (undated) DEVICE — ADHESIVE DERMABOND ADVANCED

## (undated) DEVICE — HANDLE PISTOL GRIP HAND CNTRL

## (undated) DEVICE — BAG TISS RETRV MONARCH 10MM

## (undated) DEVICE — POSITIONER HEAD DONUT 9IN FOAM

## (undated) DEVICE — SOL 9P NACL IRR PIC IL

## (undated) DEVICE — DECANTER VIAL ASEPTIC TRANSFER

## (undated) DEVICE — SOL NS 1000CC

## (undated) DEVICE — SYR 3CC LUER LOC

## (undated) DEVICE — APPLICATOR CHLORAPREP ORN 26ML

## (undated) DEVICE — DEVICE CLOSURE DISP 14G

## (undated) DEVICE — CORD LAP 10 DISP

## (undated) DEVICE — SUPPORT ULNA NERVE PROTECTOR